# Patient Record
Sex: FEMALE | Race: WHITE | NOT HISPANIC OR LATINO | Employment: OTHER | URBAN - METROPOLITAN AREA
[De-identification: names, ages, dates, MRNs, and addresses within clinical notes are randomized per-mention and may not be internally consistent; named-entity substitution may affect disease eponyms.]

---

## 2017-01-12 ENCOUNTER — OFFICE VISIT (OUTPATIENT)
Dept: FAMILY MEDICINE CLINIC | Facility: CLINIC | Age: 72
End: 2017-01-12

## 2017-01-12 VITALS
HEIGHT: 62 IN | DIASTOLIC BLOOD PRESSURE: 58 MMHG | HEART RATE: 95 BPM | OXYGEN SATURATION: 93 % | BODY MASS INDEX: 20.68 KG/M2 | TEMPERATURE: 98 F | WEIGHT: 112.4 LBS | SYSTOLIC BLOOD PRESSURE: 100 MMHG

## 2017-01-12 DIAGNOSIS — Z09 ENCOUNTER FOR FOLLOW-UP EXAMINATION AFTER COMPLETED TREATMENT FOR CONDITIONS OTHER THAN MALIGNANT NEOPLASM: ICD-10-CM

## 2017-01-12 DIAGNOSIS — G47.00 INSOMNIA, UNSPECIFIED TYPE: Primary | ICD-10-CM

## 2017-01-12 DIAGNOSIS — M79.7 FIBROMYALGIA: ICD-10-CM

## 2017-01-12 DIAGNOSIS — E78.5 HYPERLIPIDEMIA, UNSPECIFIED HYPERLIPIDEMIA TYPE: ICD-10-CM

## 2017-01-12 DIAGNOSIS — Z12.39 BREAST CANCER SCREENING: ICD-10-CM

## 2017-01-12 DIAGNOSIS — D50.8 OTHER IRON DEFICIENCY ANEMIA: ICD-10-CM

## 2017-01-12 PROCEDURE — 99214 OFFICE O/P EST MOD 30 MIN: CPT | Performed by: NURSE PRACTITIONER

## 2017-01-12 RX ORDER — TIOTROPIUM BROMIDE INHALATION SPRAY 3.12 UG/1
SPRAY, METERED RESPIRATORY (INHALATION)
COMMUNITY
Start: 2016-12-15 | End: 2018-12-13

## 2017-01-12 NOTE — PROGRESS NOTES
"Subjective   Елена Beach is a 71 y.o. female.     History of Present Illness   Елена Beach 71 y.o. female who presents today for a new patient appointment.    she has a history of   Patient Active Problem List   Diagnosis   • Fibromyalgia   • Arthritis   • Hiatal hernia   • Anxiety and depression   • Asthma   • Hyperlipidemia   • Iron deficiency anemia   • Chronic obstructive pulmonary disease   • Pulmonary emphysema   • Chronic fatigue   • Snoring   • Dependence on nicotine from cigarettes   • Insomnia   • Breast cancer screening   .  I reviewed the PFSH recorded today by my MA/LPN staff.   she is here to establish care.  She has been feeling well.    Елена Beach 71 y.o. female presents for new evaluation and treatment of insomnia with onset of symptoms years ago. Patient describes symptoms as frequent night time awakening and difficulty falling asleep. Patient has found no relief with over the counter diphenhydramine.         The following portions of the patient's history were reviewed and updated as appropriate: allergies, current medications, past social history and problem list.    Review of Systems   All other systems reviewed and are negative.      Objective   Visit Vitals   • /58 (BP Location: Left arm, Patient Position: Sitting)   • Pulse 95   • Temp 98 °F (36.7 °C)   • Ht 62\" (157.5 cm)   • Wt 112 lb 6.4 oz (51 kg)   • SpO2 93%   • BMI 20.56 kg/m2     Physical Exam   Constitutional: She is oriented to person, place, and time. Vital signs are normal. She appears well-developed and well-nourished. No distress.   HENT:   Head: Normocephalic.   Cardiovascular: Normal rate, regular rhythm and normal heart sounds.    Pulmonary/Chest: Effort normal and breath sounds normal.   Neurological: She is alert and oriented to person, place, and time. Gait normal.   Psychiatric: She has a normal mood and affect. Her behavior is normal. Judgment and thought content normal.   Vitals reviewed.    The " patient has read and signed the [facility] Controlled Substance Contract.  I will continue to see patient for regular follow up appointments.  They are well controlled on their medication.  GEORGETTE has been reviewed by me and is updated every 3 months. The patient is aware of the potential for addiction and dependence.    Assessment/Plan   Problem List Items Addressed This Visit        Musculoskeletal and Integument    Fibromyalgia       Hematopoietic and Hemostatic    Iron deficiency anemia    Relevant Medications    IRON PO    Other Relevant Orders    CBC & Differential    Iron Profile       Other    Hyperlipidemia    Insomnia - Primary    Relevant Medications    Suvorexant (BELSOMRA) 20 MG tablet    Breast cancer screening    Relevant Orders    Mammo Diagnostic Bilateral With CAD      Other Visit Diagnoses     Encounter for follow-up examination after completed treatment for conditions other than malignant neoplasm         Relevant Orders    Mammo Diagnostic Bilateral With CAD           rto in 3 mons

## 2017-01-12 NOTE — MR AVS SNAPSHOT
Елена JOSHUA Cruzerigarrett   1/12/2017 1:00 PM   Office Visit    Dept Phone:  [phone]   Encounter #:  [id number]    Provider:  BEATRIS Collins   Department:  [facility]                Your Full Care Plan              Today's Medication Changes          These changes are accurate as of: 1/12/17  1:27 PM.  If you have any questions, ask your nurse or doctor.               New Medication(s)Ordered:     Suvorexant 20 MG tablet   Commonly known as:  BELSOMRA   Take 20 mg by mouth Every Night.   Started by:  BEATRIS Collins            Where to Get Your Medications      You can get these medications from any pharmacy     Bring a paper prescription for each of these medications     Suvorexant 20 MG tablet                  Your Updated Medication List          This list is accurate as of: 1/12/17  1:27 PM.  Always use your most recent med list.                albuterol 108 (90 BASE) MCG/ACT inhaler   Commonly known as:  PROVENTIL HFA;VENTOLIN HFA       atorvastatin 10 MG tablet   Commonly known as:  LIPITOR   Take 1 tablet by mouth Daily.       budesonide-formoterol 160-4.5 MCG/ACT inhaler   Commonly known as:  SYMBICORT   Inhale 1 puff 2 (two) times a day.       DULoxetine 60 MG capsule   Commonly known as:  CYMBALTA   Take 1 capsule by mouth Daily.       IRON PO       omeprazole 20 MG capsule   Commonly known as:  priLOSEC   TAKE TWO CAPSULES BY MOUTH ONCE DAILY       SPIRIVA RESPIMAT 2.5 MCG/ACT aerosol solution   Generic drug:  Tiotropium Bromide Monohydrate       Suvorexant 20 MG tablet   Commonly known as:  BELSOMRA   Take 20 mg by mouth Every Night.               We Performed the Following     CBC & Differential     Iron Profile       You Were Diagnosed With        Codes Comments    Insomnia, unspecified type    -  Primary ICD-10-CM: G47.00  ICD-9-CM: 780.52     Other iron deficiency anemia     ICD-10-CM: D50.8     Breast cancer screening     ICD-10-CM:  Z12.39  ICD-9-CM: V76.10     Encounter for follow-up examination after completed treatment for conditions other than malignant neoplasm     ICD-10-CM: Z09  ICD-9-CM: V67.9     Hyperlipidemia, unspecified hyperlipidemia type     ICD-10-CM: E78.5  ICD-9-CM: 272.4     Fibromyalgia     ICD-10-CM: M79.7  ICD-9-CM: 729.1       Instructions     None    Patient Instructions History      Upcoming Appointments     Visit Type Date Time Department    OFFICE VISIT 2017  1:00 PM [facility]    OFFICE VISIT 2017 10:00 AM [email or web address]      [facility] Signup     [facility] [employer] allows you to send messages to your doctor, view your test results, renew your prescriptions, schedule appointments, and more. To sign up, go to [employer] and click on the Sign Up Now link in the New User? box. Enter your [employer] Activation Code exactly as it appears below along with the last four digits of your Social Security Number and your Date of Birth () to complete the sign-up process. If you do not sign up before the expiration date, you must request a new code.    [employer] Activation Code: [email or web address]  Expires: 2017  1:27 PM    If you have questions, you can email [email or web address] or call [phone] to talk to our [employer] staff. Remember, [employer] is NOT to be used for urgent needs. For medical emergencies, dial 911.               Other Info from Your Visit           Your Appointments     2017 10:00 AM EST   Office Visit with BEATRIS Collins   [facility] (--)    [address] [id number] [phone]           Arrive 15 minutes prior to appointment.              Allergies     Codeine      Keflex [Cephalexin]      Pseudoephedrine        Reason for Visit     Establish Care Patient is wanting to establish primary care. Needing a referral for a mammogram and needing medications refilled      Immunizations  "Patient has not had a hep c screening       Vital Signs     Blood Pressure Pulse Temperature Height Weight Oxygen Saturation    100/58 (BP Location: Left arm, Patient Position: Sitting) 95 98 °F (36.7 °C) 62\" (157.5 cm) 112 lb 6.4 oz (51 kg) 93%    Body Mass Index Smoking Status                20.56 kg/m2 Former Smoker          Problems and Diagnoses Noted     Breast cancer screening    Fibromyalgia    High cholesterol or triglycerides    Difficulty falling or staying asleep    Iron deficiency anemia    Encounter for follow-up examination after completed treatment for conditions other than malignant neoplasm          No Longer an Issue     CAP (community acquired pneumonia)    Pneumonia    Pneumonia of left lung due to infectious organism    Disease due to rhinovirus    URI, acute        "

## 2017-01-13 LAB
BASOPHILS # BLD AUTO: 0.1 X10E3/UL (ref 0–0.2)
BASOPHILS NFR BLD AUTO: 1 %
EOSINOPHIL # BLD AUTO: 0.9 X10E3/UL (ref 0–0.4)
EOSINOPHIL NFR BLD AUTO: 15 %
ERYTHROCYTE [DISTWIDTH] IN BLOOD BY AUTOMATED COUNT: 12.8 % (ref 12.3–15.4)
FERRITIN SERPL-MCNC: 28 NG/ML (ref 15–150)
HCT VFR BLD AUTO: 39 % (ref 34–46.6)
HGB BLD-MCNC: 12.9 G/DL (ref 11.1–15.9)
IMM GRANULOCYTES # BLD: 0 X10E3/UL (ref 0–0.1)
IMM GRANULOCYTES NFR BLD: 0 %
IRON SATN MFR SERPL: 41 % (ref 15–55)
IRON SERPL-MCNC: 128 UG/DL (ref 27–139)
LYMPHOCYTES # BLD AUTO: 1.6 X10E3/UL (ref 0.7–3.1)
LYMPHOCYTES NFR BLD AUTO: 28 %
MCH RBC QN AUTO: 30.4 PG (ref 26.6–33)
MCHC RBC AUTO-ENTMCNC: 33.1 G/DL (ref 31.5–35.7)
MCV RBC AUTO: 92 FL (ref 79–97)
MONOCYTES # BLD AUTO: 0.6 X10E3/UL (ref 0.1–0.9)
MONOCYTES NFR BLD AUTO: 11 %
NEUTROPHILS # BLD AUTO: 2.5 X10E3/UL (ref 1.4–7)
NEUTROPHILS NFR BLD AUTO: 45 %
PLATELET # BLD AUTO: 413 X10E3/UL (ref 150–379)
RBC # BLD AUTO: 4.25 X10E6/UL (ref 3.77–5.28)
TIBC SERPL-MCNC: 314 UG/DL (ref 250–450)
UIBC SERPL-MCNC: 186 UG/DL (ref 118–369)
WBC # BLD AUTO: 5.5 X10E3/UL (ref 3.4–10.8)

## 2017-01-16 RX ORDER — OMEPRAZOLE 20 MG/1
CAPSULE, DELAYED RELEASE ORAL
Qty: 60 CAPSULE | Refills: 1 | Status: SHIPPED | OUTPATIENT
Start: 2017-01-16 | End: 2017-03-17 | Stop reason: SDUPTHER

## 2017-01-24 ENCOUNTER — HOSPITAL ENCOUNTER (OUTPATIENT)
Dept: MAMMOGRAPHY | Facility: HOSPITAL | Age: 72
Discharge: HOME | End: 2017-01-24
Admitting: NURSE PRACTITIONER

## 2017-01-24 DIAGNOSIS — Z12.39 BREAST CANCER SCREENING: ICD-10-CM

## 2017-01-24 DIAGNOSIS — Z09 ENCOUNTER FOR FOLLOW-UP EXAMINATION AFTER COMPLETED TREATMENT FOR CONDITIONS OTHER THAN MALIGNANT NEOPLASM: ICD-10-CM

## 2017-01-24 PROCEDURE — G0202 SCR MAMMO BI INCL CAD: HCPCS

## 2017-02-21 ENCOUNTER — OFFICE VISIT (OUTPATIENT)
Dept: FAMILY MEDICINE CLINIC | Facility: CLINIC | Age: 72
End: 2017-02-21

## 2017-02-21 VITALS
SYSTOLIC BLOOD PRESSURE: 102 MMHG | DIASTOLIC BLOOD PRESSURE: 66 MMHG | HEIGHT: 62 IN | WEIGHT: 112.5 LBS | BODY MASS INDEX: 20.7 KG/M2 | OXYGEN SATURATION: 94 % | TEMPERATURE: 97.6 F | HEART RATE: 77 BPM

## 2017-02-21 DIAGNOSIS — Z11.59 NEED FOR HEPATITIS C SCREENING TEST: ICD-10-CM

## 2017-02-21 DIAGNOSIS — F32.A ANXIETY AND DEPRESSION: Primary | ICD-10-CM

## 2017-02-21 DIAGNOSIS — M85.80 OSTEOPENIA: ICD-10-CM

## 2017-02-21 DIAGNOSIS — E78.49 OTHER HYPERLIPIDEMIA: ICD-10-CM

## 2017-02-21 DIAGNOSIS — F41.9 ANXIETY AND DEPRESSION: Primary | ICD-10-CM

## 2017-02-21 DIAGNOSIS — M89.9 DISORDER OF BONE: ICD-10-CM

## 2017-02-21 PROCEDURE — G0438 PPPS, INITIAL VISIT: HCPCS | Performed by: NURSE PRACTITIONER

## 2017-02-21 RX ORDER — ATORVASTATIN CALCIUM 10 MG/1
10 TABLET, FILM COATED ORAL DAILY
Qty: 90 TABLET | Refills: 3 | Status: SHIPPED | OUTPATIENT
Start: 2017-02-21 | End: 2018-02-18 | Stop reason: SDUPTHER

## 2017-02-21 RX ORDER — DULOXETIN HYDROCHLORIDE 60 MG/1
60 CAPSULE, DELAYED RELEASE ORAL DAILY
Qty: 90 CAPSULE | Refills: 3 | Status: SHIPPED | OUTPATIENT
Start: 2017-02-21 | End: 2018-02-18 | Stop reason: SDUPTHER

## 2017-02-21 NOTE — PROGRESS NOTES
QUICK REFERENCE INFORMATION:  The ABCs of the Annual Wellness Visit    Initial Medicare Wellness Visit    HEALTH RISK ASSESSMENT    Recent Hospitalizations:  No recent hospitalization(s)..        Current Medical Providers:  Patient Care Team:  BEATRIS Collins as PCP - General (Family Medicine)        Smoking Status:  History   Smoking Status   • Former Smoker   Smokeless Tobacco   • Former User       Alcohol Consumption:  History   Alcohol Use No       Depression Screen:   No flowsheet data found.    Health Habits and Functional and Cognitive Screening:  No flowsheet data found.               Does the patient have evidence of cognitive impairment? No    Asprin use counseling:yes    Finger Rub Hearing Test (right ear):passed  Finger Rub Hearing Test (left ear):passed    Recent Lab Results:    Visual Acuity:  No exam data present    Age-appropriate Screening Schedule:  Refer to the list below for future screening recommendations based on patient's age, sex and/or medical conditions. Orders for these recommended tests are listed in the plan section. The patient has been provided with a written plan.    Health Maintenance   Topic Date Due   • ZOSTER VACCINE  05/26/2016   • DXA SCAN  11/15/2016   • LIPID PANEL  04/01/2017   • PNEUMOCOCCAL VACCINES (65+ LOW/MEDIUM RISK) (2 of 2 - PPSV23) 10/03/2017   • MAMMOGRAM  01/24/2019   • COLONOSCOPY  02/01/2026   • TDAP/TD VACCINES (2 - Td) 10/03/2026   • INFLUENZA VACCINE  Completed        Subjective   History of Present Illness    Елена Beach is a 71 y.o. female who presents for an Annual Wellness Visit. In addition, we addressed the following health issues:    The following portions of the patient's history were reviewed and updated as appropriate: allergies, current medications, past family history, past medical history, past social history, past surgical history and problem list.    Outpatient Medications Prior to Visit   Medication Sig Dispense Refill   •  budesonide-formoterol (SYMBICORT) 160-4.5 MCG/ACT inhaler Inhale 1 puff 2 (two) times a day. 1 inhaler 11   • IRON PO Take  by mouth.     • omeprazole (priLOSEC) 20 MG capsule TAKE TWO CAPSULES BY MOUTH ONCE DAILY 60 capsule 1   • SPIRIVA RESPIMAT 2.5 MCG/ACT aerosol solution      • atorvastatin (LIPITOR) 10 MG tablet Take 1 tablet by mouth Daily. 30 tablet 1   • DULoxetine (CYMBALTA) 60 MG capsule Take 1 capsule by mouth Daily. 30 capsule 1   • albuterol (PROVENTIL HFA;VENTOLIN HFA) 108 (90 BASE) MCG/ACT inhaler daily. ProAir  (90 Base) MCG/ACT Inhalation Aerosol Solution; Patient Sig: ProAir  (90 Base) MCG/ACT Inhalation Aerosol Solution INHALE 2 PUFFS EVERY 4-6 HOURS AS NEEDED.; 1; 5; 20-Jan-2014; Active     • Suvorexant (BELSOMRA) 20 MG tablet Take 20 mg by mouth Every Night. 10 tablet 0     No facility-administered medications prior to visit.        Patient Active Problem List   Diagnosis   • Fibromyalgia   • Arthritis   • Hiatal hernia   • Anxiety and depression   • Asthma   • Hyperlipidemia   • Iron deficiency anemia   • Chronic obstructive pulmonary disease   • Pulmonary emphysema   • Chronic fatigue   • Snoring   • Dependence on nicotine from cigarettes   • Insomnia   • Breast cancer screening   • Osteopenia   • Need for hepatitis C screening test   • Disorder of bone        Advanced Care Planning:  has an advanced directive - a copy HAS NOT been provided    Identification of Risk Factors:  Risk factors include: none.    Review of Systems   All other systems reviewed and are negative.      Compared to one year ago, the patient feels her physical health is better.  Compared to one year ago, the patient feels her mental health is better.    Objective     Physical Exam   Constitutional: She is oriented to person, place, and time. Vital signs are normal. She appears well-developed and well-nourished. No distress.   HENT:   Head: Normocephalic.   Cardiovascular: Normal rate, regular rhythm and  "normal heart sounds.    Pulmonary/Chest: Effort normal and breath sounds normal.   Neurological: She is alert and oriented to person, place, and time. Gait normal.   Psychiatric: She has a normal mood and affect. Her behavior is normal. Judgment and thought content normal.   Vitals reviewed.      Vitals:    02/21/17 1103   BP: 102/66   BP Location: Right arm   Patient Position: Sitting   Pulse: 77   Temp: 97.6 °F (36.4 °C)   SpO2: 94%   Weight: 112 lb 8 oz (51 kg)   Height: 62\" (157.5 cm)       Body mass index is 20.58 kg/(m^2).  Discussed the patient's BMI with her. The BMI is in the acceptable range.    Assessment/Plan   Patient Self-Management and Personalized Health Advice  The patient has been provided with information about: concerns with lack of communication with son and that makes her sad and preventive services including:   · Bone densitometry screening.    Visit Diagnoses:    ICD-10-CM ICD-9-CM   1. Anxiety and depression F41.9 300.00    F32.9 311   2. Other hyperlipidemia E78.4 272.4   3. Osteopenia M85.80 733.90   4. Disorder of bone  M89.9 733.90   5. Need for hepatitis C screening test Z11.59 V73.89       Orders Placed This Encounter   Procedures   • DEXA Bone Density Axial     Standing Status:   Future     Standing Expiration Date:   2/21/2018     Order Specific Question:   Reason for Exam:     Answer:   screening   • Hepatitis C Antibody       Outpatient Encounter Prescriptions as of 2/21/2017   Medication Sig Dispense Refill   • atorvastatin (LIPITOR) 10 MG tablet Take 1 tablet by mouth Daily. 90 tablet 3   • budesonide-formoterol (SYMBICORT) 160-4.5 MCG/ACT inhaler Inhale 1 puff 2 (two) times a day. 1 inhaler 11   • DULoxetine (CYMBALTA) 60 MG capsule Take 1 capsule by mouth Daily. 90 capsule 3   • IRON PO Take  by mouth.     • omeprazole (priLOSEC) 20 MG capsule TAKE TWO CAPSULES BY MOUTH ONCE DAILY 60 capsule 1   • SPIRIVA RESPIMAT 2.5 MCG/ACT aerosol solution      • [DISCONTINUED] atorvastatin " (LIPITOR) 10 MG tablet Take 1 tablet by mouth Daily. 30 tablet 1   • [DISCONTINUED] DULoxetine (CYMBALTA) 60 MG capsule Take 1 capsule by mouth Daily. 30 capsule 1   • [DISCONTINUED] albuterol (PROVENTIL HFA;VENTOLIN HFA) 108 (90 BASE) MCG/ACT inhaler daily. ProAir  (90 Base) MCG/ACT Inhalation Aerosol Solution; Patient Sig: ProAir  (90 Base) MCG/ACT Inhalation Aerosol Solution INHALE 2 PUFFS EVERY 4-6 HOURS AS NEEDED.; 1; 5; 20-Jan-2014; Active     • [DISCONTINUED] Suvorexant (BELSOMRA) 20 MG tablet Take 20 mg by mouth Every Night. 10 tablet 0     No facility-administered encounter medications on file as of 2/21/2017.        Reviewed use of high risk medication in the elderly: yes  Reviewed for potential of harmful drug interactions in the elderly: yes    Follow Up:  No Follow-up on file.     An After Visit Summary and PPPS with all of these plans were given to the patient.

## 2017-02-22 ENCOUNTER — OFFICE VISIT (OUTPATIENT)
Dept: FAMILY MEDICINE CLINIC | Facility: CLINIC | Age: 72
End: 2017-02-22

## 2017-02-22 VITALS
BODY MASS INDEX: 20.61 KG/M2 | HEART RATE: 95 BPM | DIASTOLIC BLOOD PRESSURE: 62 MMHG | SYSTOLIC BLOOD PRESSURE: 108 MMHG | OXYGEN SATURATION: 94 % | TEMPERATURE: 98.3 F | HEIGHT: 62 IN | WEIGHT: 112 LBS

## 2017-02-22 DIAGNOSIS — Z01.419 PAP TEST, AS PART OF ROUTINE GYNECOLOGICAL EXAMINATION: Primary | ICD-10-CM

## 2017-02-22 LAB — HCV AB S/CO SERPL IA: <0.1 S/CO RATIO (ref 0–0.9)

## 2017-02-22 PROCEDURE — G0101 CA SCREEN;PELVIC/BREAST EXAM: HCPCS | Performed by: NURSE PRACTITIONER

## 2017-02-22 PROCEDURE — 99212 OFFICE O/P EST SF 10 MIN: CPT | Performed by: NURSE PRACTITIONER

## 2017-02-23 LAB
CYTOLOGIST CVX/VAG CYTO: NORMAL
CYTOLOGY CVX/VAG DOC THIN PREP: NORMAL
DX ICD CODE: NORMAL
HIV 1 & 2 AB SER-IMP: NORMAL
OTHER STN SPEC: NORMAL
PATH REPORT.FINAL DX SPEC: NORMAL
STAT OF ADQ CVX/VAG CYTO-IMP: NORMAL

## 2017-03-02 ENCOUNTER — HOSPITAL ENCOUNTER (OUTPATIENT)
Dept: BONE DENSITY | Facility: HOSPITAL | Age: 72
Discharge: HOME | End: 2017-03-02
Admitting: NURSE PRACTITIONER

## 2017-03-02 DIAGNOSIS — M85.80 OSTEOPENIA: ICD-10-CM

## 2017-03-02 DIAGNOSIS — M89.9 DISORDER OF BONE: ICD-10-CM

## 2017-03-02 PROCEDURE — 77080 DXA BONE DENSITY AXIAL: CPT

## 2017-03-16 RX ORDER — OMEPRAZOLE 20 MG/1
CAPSULE, DELAYED RELEASE ORAL
Qty: 60 CAPSULE | Refills: 0 | OUTPATIENT
Start: 2017-03-16

## 2017-03-17 ENCOUNTER — TELEPHONE (OUTPATIENT)
Dept: GASTROENTEROLOGY | Facility: CLINIC | Age: 72
End: 2017-03-17

## 2017-03-17 RX ORDER — OMEPRAZOLE 20 MG/1
CAPSULE, DELAYED RELEASE ORAL
Qty: 60 CAPSULE | Refills: 0 | Status: SHIPPED | OUTPATIENT
Start: 2017-03-17 | End: 2017-03-28 | Stop reason: SDUPTHER

## 2017-03-17 NOTE — TELEPHONE ENCOUNTER
Called pt and advised that we have escribed a 30 day supply of her omeprazole to get her by till her appt.  Pt verb understanding.

## 2017-03-17 NOTE — TELEPHONE ENCOUNTER
----- Message from Ange Fu sent at 3/17/2017 10:29 AM EDT -----  Regarding: PT CALLED - PENDING REFILL   Contact: [phone]  [facility] O/V ON 03/28 WITH DR OTTONIEL LAUGHLIN OUT OF MEDS. NEED REFILL

## 2017-03-28 ENCOUNTER — OFFICE VISIT (OUTPATIENT)
Dept: GASTROENTEROLOGY | Facility: CLINIC | Age: 72
End: 2017-03-28

## 2017-03-28 VITALS
DIASTOLIC BLOOD PRESSURE: 70 MMHG | SYSTOLIC BLOOD PRESSURE: 110 MMHG | BODY MASS INDEX: 19.28 KG/M2 | WEIGHT: 108.8 LBS | HEIGHT: 63 IN

## 2017-03-28 DIAGNOSIS — K21.9 GASTROESOPHAGEAL REFLUX DISEASE, ESOPHAGITIS PRESENCE NOT SPECIFIED: Primary | ICD-10-CM

## 2017-03-28 DIAGNOSIS — D50.9 IRON DEFICIENCY ANEMIA, UNSPECIFIED IRON DEFICIENCY ANEMIA TYPE: ICD-10-CM

## 2017-03-28 DIAGNOSIS — K90.41 GLUTEN-SENSITIVE ENTEROPATHY: ICD-10-CM

## 2017-03-28 PROCEDURE — 99214 OFFICE O/P EST MOD 30 MIN: CPT | Performed by: INTERNAL MEDICINE

## 2017-03-28 RX ORDER — OMEPRAZOLE 40 MG/1
CAPSULE, DELAYED RELEASE ORAL
Qty: 90 CAPSULE | Refills: 3 | Status: SHIPPED | OUTPATIENT
Start: 2017-03-28 | End: 2018-04-18 | Stop reason: SDUPTHER

## 2017-03-28 NOTE — PROGRESS NOTES
Chief Complaint   Patient presents with   • Follow-up     yearly        Елена Beach is a  72 y.o. female here for a follow up visit for GERD, iron deficiency anemia, gluten enteropathy    HPI this 72-year-old white female patient of Caleb SPEAR returns in follow-up since her last office visit of January 2016.  At that time she had been scheduled for upper and lower endoscopic evaluation secondary to her iron deficiency state.  Upper endoscopy revealed some focal villous blunting consistent with her history of celiac disease.  He also had mild gastritis and mild esophagitis.  Colonoscopy was essentially negative.  She reports lab work obtained in January of this year showed stable hemoglobin and hematocrit as well as normal iron studies.  She continues to use omeprazole 40 mg daily to manage reflux.  She has had 2 episodes of crampy abdominal pain and one was followed by a bout of diarrhea after she had undergone dental extraction and bone grafting.  She was recently placed on amoxicillin after her stitches were removed from this extraction advised her to initiate a probiotic at the same time to minimize antibiotic-induced colitis.  She is going to observe her condition and will call with any further GI related problems.  Otherwise plan on follow-up in 1 year and will prescribe 40 mg omeprazole daily with a 3 month supply and 3 refills.    Past Medical History:   Diagnosis Date   • Arthritis    • Asthma    • Fibromyalgia    • Hiatal hernia    • Osteoporosis        Current Outpatient Prescriptions   Medication Sig Dispense Refill   • atorvastatin (LIPITOR) 10 MG tablet Take 1 tablet by mouth Daily. 90 tablet 3   • budesonide-formoterol (SYMBICORT) 160-4.5 MCG/ACT inhaler Inhale 1 puff 2 (two) times a day. 1 inhaler 11   • DULoxetine (CYMBALTA) 60 MG capsule Take 1 capsule by mouth Daily. 90 capsule 3   • omeprazole (priLOSEC) 20 MG capsule Take two capsules by mouth once daily 60 capsule 0   • SPIRIVA  RESPIMAT 2.5 MCG/ACT aerosol solution      • IRON PO Take  by mouth.       No current facility-administered medications for this visit.        PRN Meds:.    Allergies   Allergen Reactions   • Codeine    • Ibuprofen    • Keflex [Cephalexin]    • Pseudoephedrine        Social History     Social History   • Marital status: Single     Spouse name: N/A   • Number of children: N/A   • Years of education: N/A     Occupational History   • Not on file.     Social History Main Topics   • Smoking status: Former Smoker     Quit date: 11/1/2016   • Smokeless tobacco: Former User   • Alcohol use Yes      Comment: social   • Drug use: No   • Sexual activity: Defer     Other Topics Concern   • Not on file     Social History Narrative       History reviewed. No pertinent family history.    Review of Systems   Constitutional: Positive for fatigue. Negative for activity change, appetite change and unexpected weight change.        Insomnia   HENT: Negative for congestion, facial swelling, sore throat, trouble swallowing and voice change.    Eyes: Negative for photophobia and visual disturbance.   Respiratory: Negative for cough and choking.    Cardiovascular: Negative for chest pain.   Gastrointestinal: Negative for abdominal distention, abdominal pain, anal bleeding, blood in stool, constipation, diarrhea, nausea, rectal pain and vomiting.        GERD   Endocrine: Negative for polyphagia.   Musculoskeletal: Negative for arthralgias, gait problem and joint swelling.        Fibromyalgia   Skin: Negative for color change, pallor and rash.   Allergic/Immunologic: Negative for food allergies.   Neurological: Negative for speech difficulty and headaches.   Hematological: Does not bruise/bleed easily.   Psychiatric/Behavioral: Negative for agitation, confusion and sleep disturbance.       Vitals:    03/28/17 1112   BP: 110/70       Physical Exam   Constitutional: She is oriented to person, place, and time. She appears well-developed and  well-nourished.   HENT:   Head: Normocephalic.   Mouth/Throat: Oropharynx is clear and moist.   Eyes: Conjunctivae and EOM are normal.   Neck: Normal range of motion.   Cardiovascular: Normal rate and regular rhythm.    Pulmonary/Chest: Breath sounds normal.   Abdominal: Soft. Bowel sounds are normal.   Musculoskeletal: Normal range of motion.   Neurological: She is alert and oriented to person, place, and time.   Skin: Skin is warm and dry.   Psychiatric: She has a normal mood and affect. Her behavior is normal.       ASSESSMENT   #1 GERD: Stable on PPI  #2 iron deficiency anemia: Stable per recent labs  #3 gluten enteropathy      PLAN  Continue omeprazole 40 mg daily  Office follow-up in 1 year  Patient to call sooner as needed for any GI related complaints      ICD-10-CM ICD-9-CM   1. Gastroesophageal reflux disease, esophagitis presence not specified K21.9 530.81   2. Iron deficiency anemia, unspecified iron deficiency anemia type D50.9 280.9   3. Gluten-sensitive enteropathy K90.0 579.0

## 2017-04-06 DIAGNOSIS — J18.9 CAP (COMMUNITY ACQUIRED PNEUMONIA): ICD-10-CM

## 2017-04-06 RX ORDER — BUDESONIDE AND FORMOTEROL FUMARATE DIHYDRATE 160; 4.5 UG/1; UG/1
AEROSOL RESPIRATORY (INHALATION)
Refills: 0 | Status: CANCELLED | OUTPATIENT
Start: 2017-04-06

## 2017-04-10 DIAGNOSIS — J18.9 CAP (COMMUNITY ACQUIRED PNEUMONIA): ICD-10-CM

## 2017-04-12 ENCOUNTER — TELEPHONE (OUTPATIENT)
Dept: FAMILY MEDICINE CLINIC | Facility: CLINIC | Age: 72
End: 2017-04-12

## 2017-04-12 DIAGNOSIS — J18.9 CAP (COMMUNITY ACQUIRED PNEUMONIA): ICD-10-CM

## 2017-04-12 RX ORDER — BUDESONIDE AND FORMOTEROL FUMARATE DIHYDRATE 160; 4.5 UG/1; UG/1
1 AEROSOL RESPIRATORY (INHALATION)
Qty: 1 INHALER | Refills: 11 | Status: SHIPPED | OUTPATIENT
Start: 2017-04-12 | End: 2018-02-20

## 2017-04-12 NOTE — TELEPHONE ENCOUNTER
Patient requesting Symbicort refill for [facility] [address]. Pharmacy is telling patient they have tried to contact us several times since Friday. Patient notified no contact made to us for this medication.    Please call patient after speaking with pharmacy.

## 2017-04-14 DIAGNOSIS — E78.5 HYPERLIPIDEMIA, UNSPECIFIED HYPERLIPIDEMIA TYPE: Primary | ICD-10-CM

## 2017-04-14 DIAGNOSIS — D50.8 OTHER IRON DEFICIENCY ANEMIA: ICD-10-CM

## 2017-04-15 LAB
ALBUMIN SERPL-MCNC: 4 G/DL (ref 3.5–4.8)
ALBUMIN/GLOB SERPL: 1.5 {RATIO} (ref 1.2–2.2)
ALP SERPL-CCNC: 132 IU/L (ref 39–117)
ALT SERPL-CCNC: 16 IU/L (ref 0–32)
AST SERPL-CCNC: 24 IU/L (ref 0–40)
BASOPHILS # BLD AUTO: 0 X10E3/UL (ref 0–0.2)
BASOPHILS NFR BLD AUTO: 0 %
BILIRUB SERPL-MCNC: 0.2 MG/DL (ref 0–1.2)
BUN SERPL-MCNC: 9 MG/DL (ref 8–27)
BUN/CREAT SERPL: 15 (ref 12–28)
CALCIUM SERPL-MCNC: 9.4 MG/DL (ref 8.7–10.3)
CHLORIDE SERPL-SCNC: 99 MMOL/L (ref 96–106)
CHOLEST SERPL-MCNC: 151 MG/DL (ref 100–199)
CO2 SERPL-SCNC: 26 MMOL/L (ref 18–29)
CREAT SERPL-MCNC: 0.6 MG/DL (ref 0.57–1)
EOSINOPHIL # BLD AUTO: 1.1 X10E3/UL (ref 0–0.4)
EOSINOPHIL NFR BLD AUTO: 16 %
ERYTHROCYTE [DISTWIDTH] IN BLOOD BY AUTOMATED COUNT: 13.2 % (ref 12.3–15.4)
FERRITIN SERPL-MCNC: 20 NG/ML (ref 15–150)
GLOBULIN SER CALC-MCNC: 2.7 G/DL (ref 1.5–4.5)
GLUCOSE SERPL-MCNC: 100 MG/DL (ref 65–99)
HCT VFR BLD AUTO: 40 % (ref 34–46.6)
HDLC SERPL-MCNC: 70 MG/DL
HGB BLD-MCNC: 13.5 G/DL (ref 11.1–15.9)
IMM GRANULOCYTES # BLD: 0 X10E3/UL (ref 0–0.1)
IMM GRANULOCYTES NFR BLD: 0 %
IRON SATN MFR SERPL: 12 % (ref 15–55)
IRON SERPL-MCNC: 40 UG/DL (ref 27–139)
LDLC SERPL CALC-MCNC: 69 MG/DL (ref 0–99)
LDLC/HDLC SERPL: 1 RATIO UNITS (ref 0–3.2)
LYMPHOCYTES # BLD AUTO: 1.7 X10E3/UL (ref 0.7–3.1)
LYMPHOCYTES NFR BLD AUTO: 24 %
MCH RBC QN AUTO: 30.3 PG (ref 26.6–33)
MCHC RBC AUTO-ENTMCNC: 33.8 G/DL (ref 31.5–35.7)
MCV RBC AUTO: 90 FL (ref 79–97)
MONOCYTES # BLD AUTO: 0.6 X10E3/UL (ref 0.1–0.9)
MONOCYTES NFR BLD AUTO: 9 %
NEUTROPHILS # BLD AUTO: 3.6 X10E3/UL (ref 1.4–7)
NEUTROPHILS NFR BLD AUTO: 51 %
PLATELET # BLD AUTO: 345 X10E3/UL (ref 150–379)
POTASSIUM SERPL-SCNC: 4.3 MMOL/L (ref 3.5–5.2)
PROT SERPL-MCNC: 6.7 G/DL (ref 6–8.5)
RBC # BLD AUTO: 4.45 X10E6/UL (ref 3.77–5.28)
SODIUM SERPL-SCNC: 140 MMOL/L (ref 134–144)
TIBC SERPL-MCNC: 339 UG/DL (ref 250–450)
TRIGL SERPL-MCNC: 61 MG/DL (ref 0–149)
UIBC SERPL-MCNC: 299 UG/DL (ref 118–369)
VLDLC SERPL CALC-MCNC: 12 MG/DL (ref 5–40)
WBC # BLD AUTO: 7.1 X10E3/UL (ref 3.4–10.8)

## 2017-04-18 RX ORDER — OMEPRAZOLE 20 MG/1
CAPSULE, DELAYED RELEASE ORAL
Qty: 60 CAPSULE | Refills: 0 | OUTPATIENT
Start: 2017-04-18

## 2017-06-16 ENCOUNTER — OFFICE VISIT (OUTPATIENT)
Dept: ORTHOPEDIC SURGERY | Facility: CLINIC | Age: 72
End: 2017-06-16

## 2017-06-16 VITALS — HEIGHT: 61 IN | WEIGHT: 106.4 LBS | BODY MASS INDEX: 20.09 KG/M2 | TEMPERATURE: 97.7 F

## 2017-06-16 DIAGNOSIS — G89.29 CHRONIC PAIN OF RIGHT KNEE: Primary | ICD-10-CM

## 2017-06-16 DIAGNOSIS — M25.561 CHRONIC PAIN OF RIGHT KNEE: Primary | ICD-10-CM

## 2017-06-16 PROCEDURE — 20610 DRAIN/INJ JOINT/BURSA W/O US: CPT | Performed by: ORTHOPAEDIC SURGERY

## 2017-06-16 PROCEDURE — 73562 X-RAY EXAM OF KNEE 3: CPT | Performed by: ORTHOPAEDIC SURGERY

## 2017-06-16 PROCEDURE — 99204 OFFICE O/P NEW MOD 45 MIN: CPT | Performed by: ORTHOPAEDIC SURGERY

## 2017-06-16 RX ORDER — MULTIPLE VITAMINS W/ MINERALS TAB 9MG-400MCG
1 TAB ORAL DAILY
COMMUNITY
End: 2018-12-13

## 2017-06-16 RX ADMIN — METHYLPREDNISOLONE ACETATE 160 MG: 80 INJECTION, SUSPENSION INTRA-ARTICULAR; INTRALESIONAL; INTRAMUSCULAR; SOFT TISSUE at 07:42

## 2017-06-16 RX ADMIN — LIDOCAINE HYDROCHLORIDE 2 ML: 10 INJECTION, SOLUTION INFILTRATION; PERINEURAL at 07:42

## 2017-06-16 RX ADMIN — BUPIVACAINE HYDROCHLORIDE 2 ML: 5 INJECTION, SOLUTION PERINEURAL at 07:42

## 2017-06-18 NOTE — PROGRESS NOTES
Patient: Елена Beach    YOB: 1945    Medical Record Number: [id number]    Chief Complaints:  Right knee pain    History of Present Illness:     72 y.o. female patient who presents for evaluation of her right knee.  She reports a several year history of worsening symptoms.  She fell the week before [place] and it seemed to aggravate things.  She describes moderate, constant, aching/burning pain along the medial side of the knee.  The pain is worse with walking and going up and down stairs.  She has noticed some associated swelling.  Rest seems to help.    Allergies:   Allergies   Allergen Reactions   • Codeine    • Ibuprofen    • Keflex [Cephalexin]    • Pseudoephedrine        Medications:   Home Medications    Current Outpatient Prescriptions:   •  atorvastatin (LIPITOR) 10 MG tablet, Take 1 tablet by mouth Daily., Disp: 90 tablet, Rfl: 3  •  budesonide-formoterol (SYMBICORT) 160-4.5 MCG/ACT inhaler, Inhale 1 puff 2 (Two) Times a Day., Disp: 1 inhaler, Rfl: 11  •  calcium citrate-vitamin d (CALCIUM CITRATE + D3) 200-250 MG-UNIT tablet tablet, Take  by mouth Daily., Disp: , Rfl:   •  DULoxetine (CYMBALTA) 60 MG capsule, Take 1 capsule by mouth Daily., Disp: 90 capsule, Rfl: 3  •  IRON PO, Take  by mouth., Disp: , Rfl:   •  IRON, FERROUS GLUCONATE, PO, Take  by mouth., Disp: , Rfl:   •  Multiple Vitamins-Minerals (MULTIVITAMIN WITH MINERALS) tablet tablet, Take 1 tablet by mouth Daily., Disp: , Rfl:   •  omeprazole (priLOSEC) 40 MG capsule, Take 1 capsule by mouth once daily, Disp: 90 capsule, Rfl: 3  •  SPIRIVA RESPIMAT 2.5 MCG/ACT aerosol solution, , Disp: , Rfl:     Past Medical History:   Diagnosis Date   • Anemia    • Arthritis    • Asthma    • Fibromyalgia    • Hiatal hernia    • Osteoporosis        Past Surgical History:   Procedure Laterality Date   • FOOT SURGERY Right 2006   • SHOULDER SURGERY Left    • TOE SURGERY     • TOOTH EXTRACTION     • TUBAL ABDOMINAL LIGATION     • WRIST SURGERY  "        Social History     Occupational History   • Not on file.     Social History Main Topics   • Smoking status: Former Smoker     Quit date: 11/1/2016   • Smokeless tobacco: Former User   • Alcohol use Yes      Comment: social   • Drug use: No   • Sexual activity: Defer      Social History     Social History Narrative       Family History   Problem Relation Age of Onset   • Heart failure Mother    • Hypertension Mother    • Cancer Mother      breast   • Cancer Father      prostate       Review of Systems:      Constitutional: Denies fever, shaking or chills   Eyes: Denies change in visual acuity   HEENT: Denies nasal congestion or sore throat   Respiratory: Denies cough or shortness of breath   Cardiovascular: Denies chest pain or edema  Endocrine: Denies tremors, palpitations, intolerance of heat or cold, polyuria, polydipsia.  GI: Denies abdominal pain, nausea, vomiting, bloody stools or diarrhea  : Denies frequency, urgency, incontinence, retention, or nocturia.  Musculoskeletal: Denies numbness tingling or loss of motor function except as above  Integument: Denies rash, lesion or ulceration   Neurologic: Denies headache or focal weakness, deficits  Heme: Denies epistaxis, spontaneous or excessive bleeding, epistaxis, hematuria, melena, fatigue, enlarged or tender lymph nodes.      All other pertinent positives and negatives as noted above in HPI.    Physical Exam: 72 y.o. female  Vitals:    06/16/17 1359   Temp: 97.7 °F (36.5 °C)   TempSrc: Temporal Artery    Weight: 106 lb 6.4 oz (48.3 kg)   Height: 61\" (154.9 cm)       General:  Patient is awake and alert.  Appears in no acute distress or discomfort.    Psych:  Affect and demeanor are appropriate.    Eyes:  Conjunctiva and sclera appear grossly normal.  Eyes track well and EOM seem to be intact.    Ears:  No gross abnormalities.  Hearing adequate for the exam.    Cardiovascular:  Regular rate and rhythm.    Lungs:  Good chest expansion.  Breathing " unlabored.    Spine:  Back appears grossly normal.  No palpable masses or adenopathy.  Good motion.  Straight leg raise and crossed straight leg raise manuever are both negative for lower leg and/or knee pain.    Extremities:  Skin is benign.  No obvious gross abnormalities about right knee.  No palpable masses or adenopathy.  Moderate tenderness noted over medial joint line.  Motion is near full and symmetric to the contralateral side.  No instability.  Strength is well preserved including hip flexion, knee extension, ankle and toe plantarflexion, ankle inversion and eversion.  Good sensory function throughout the leg and foot.  Palpable pulses.         Radiology:   Bilateral standing AP views, bilateral merchants views and a lateral view of the Right knee are ordered by myself and reviewed to evaluate the patient's complaint.  No comparison films are immediately available.  The x-rays show significant degenerative arthritis including joint space narrowing, osteophyte formation, and subchondral sclerosis.  The majority of the degenerative changes appear to involve the medial compartment.    Assessment/Plan:  Right knee osteoarthritis    We discussed treatment options in detail including the risks, benefits, and alternatives of conservative treatment versus surgical options.  Regarding conservative treatment, we discussed appropriate activity modifications, anti-inflammatories, injections (including both corticosteroids and viscosupplementation), and physical therapy.  We also discussed the option of an arthroplasty and all that would entail.  I have recommended that we start with a conservative approach and the patient agrees.    The patient has acknowledged understanding of the information and elected for an injection.  The risks, benefits, and alternatives to an injection were discussed.  She consented.  Going forward, I will release her to follow-up as needed.  If her symptoms persist or recur, I told her that I  will be happy to see her back.    Large Joint Arthrocentesis  Date/Time: 6/16/2017 7:42 AM  Consent given by: patient  Site marked: site marked  Timeout: Immediately prior to procedure a time out was called to verify the correct patient, procedure, equipment, support staff and site/side marked as required   Supporting Documentation  Indications: pain and joint swelling   Procedure Details  Location: knee - R knee  Preparation: Patient was prepped and draped in the usual sterile fashion  Needle size: 25 G (21 G)  Approach: anterolateral  Medications administered: 2 mL lidocaine 1 %; 160 mg methylPREDNISolone acetate 80 MG/ML; 2 mL bupivacaine  Patient tolerance: patient tolerated the procedure well with no immediate complications        Gerry Estevez MD    06/16/2017    CC to BEATRIS Collins

## 2017-06-19 RX ORDER — BUPIVACAINE HYDROCHLORIDE 5 MG/ML
2 INJECTION, SOLUTION PERINEURAL
Status: COMPLETED | OUTPATIENT
Start: 2017-06-16 | End: 2017-06-16

## 2017-06-19 RX ORDER — LIDOCAINE HYDROCHLORIDE 10 MG/ML
2 INJECTION, SOLUTION INFILTRATION; PERINEURAL
Status: COMPLETED | OUTPATIENT
Start: 2017-06-16 | End: 2017-06-16

## 2017-06-19 RX ORDER — METHYLPREDNISOLONE ACETATE 80 MG/ML
160 INJECTION, SUSPENSION INTRA-ARTICULAR; INTRALESIONAL; INTRAMUSCULAR; SOFT TISSUE
Status: COMPLETED | OUTPATIENT
Start: 2017-06-16 | End: 2017-06-16

## 2017-07-31 ENCOUNTER — RESULTS ENCOUNTER (OUTPATIENT)
Dept: FAMILY MEDICINE CLINIC | Facility: CLINIC | Age: 72
End: 2017-07-31

## 2017-07-31 DIAGNOSIS — E61.1 LOW IRON: ICD-10-CM

## 2017-07-31 DIAGNOSIS — E61.1 LOW IRON: Primary | ICD-10-CM

## 2017-08-02 LAB
FERRITIN SERPL-MCNC: 23 NG/ML (ref 15–150)
IRON SATN MFR SERPL: 20 % (ref 15–55)
IRON SERPL-MCNC: 62 UG/DL (ref 27–139)
TIBC SERPL-MCNC: 314 UG/DL (ref 250–450)
UIBC SERPL-MCNC: 252 UG/DL (ref 118–369)

## 2017-10-09 ENCOUNTER — OFFICE VISIT (OUTPATIENT)
Dept: ORTHOPEDIC SURGERY | Facility: CLINIC | Age: 72
End: 2017-10-09

## 2017-10-09 VITALS — HEIGHT: 63 IN | BODY MASS INDEX: 18.78 KG/M2 | WEIGHT: 106 LBS

## 2017-10-09 DIAGNOSIS — M17.11 PRIMARY OSTEOARTHRITIS OF RIGHT KNEE: Primary | ICD-10-CM

## 2017-10-09 PROCEDURE — 99212 OFFICE O/P EST SF 10 MIN: CPT | Performed by: ORTHOPAEDIC SURGERY

## 2017-10-09 RX ORDER — MELOXICAM 15 MG/1
15 TABLET ORAL DAILY PRN
Qty: 30 TABLET | Refills: 2 | Status: SHIPPED | OUTPATIENT
Start: 2017-10-09 | End: 2018-02-20

## 2017-10-10 NOTE — PROGRESS NOTES
CC:  Right knee pain    Ms. Beach comes in today for follow-up of the right knee.  She got a shot in June.  The shot was quite painful for her and she does not feel that it helped.  Unfortunately, she continues to have moderate constant aching pain along the medial side of the knee which is worse with prolonged standing and walking.    On exam, she has a trace effusion.  Mild tenderness along the medial joint line.  Motion is well preserved.  No instability.  Gait is mildly antalgic.    Her previous x-rays are again reviewed.  The x-rays show what appears to be moderate medial compartment osteoarthritis.    Assessment: Right knee osteoarthritis    Plan: We discussed options.  The last injection did not help and was painful for her.  She is, understandably, reluctant to get that repeated.  We talked about other options.  I recommended a prescription strength anti-inflammatory.  She was given his prescription for meloxicam.  The risks of this medicine were discussed.  I've also recommended physical therapy and she was given a referral for that as well.  Going forward, she will follow up as needed.    Gerry Estevez MD

## 2017-10-13 ENCOUNTER — HOSPITAL ENCOUNTER (OUTPATIENT)
Dept: PHYSICAL THERAPY | Facility: HOSPITAL | Age: 72
Setting detail: THERAPIES SERIES
Discharge: HOME | End: 2017-10-13
Attending: ORTHOPAEDIC SURGERY

## 2017-10-13 DIAGNOSIS — M25.561 CHRONIC PAIN OF RIGHT KNEE: Primary | ICD-10-CM

## 2017-10-13 DIAGNOSIS — G89.29 CHRONIC PAIN OF RIGHT KNEE: Primary | ICD-10-CM

## 2017-10-13 PROCEDURE — 97161 PT EVAL LOW COMPLEX 20 MIN: CPT | Performed by: PHYSICAL THERAPIST

## 2017-10-13 PROCEDURE — G8982 BODY POS GOAL STATUS: HCPCS | Performed by: PHYSICAL THERAPIST

## 2017-10-13 PROCEDURE — 97110 THERAPEUTIC EXERCISES: CPT | Performed by: PHYSICAL THERAPIST

## 2017-10-13 PROCEDURE — G8981 BODY POS CURRENT STATUS: HCPCS | Performed by: PHYSICAL THERAPIST

## 2017-10-13 NOTE — THERAPY EVALUATION
Outpatient Physical Therapy Ortho Initial Evaluation  [facility]     Patient Name: Елена Beach  : 1945  MRN: [id number]  Today's Date: 10/13/2017      Visit Date: 10/13/2017    Patient Active Problem List   Diagnosis   • Fibromyalgia   • Arthritis   • Hiatal hernia   • Anxiety and depression   • Asthma   • Hyperlipidemia   • Iron deficiency anemia   • Chronic obstructive pulmonary disease   • Pulmonary emphysema   • Chronic fatigue   • Snoring   • Dependence on nicotine from cigarettes   • Insomnia   • Breast cancer screening   • Osteopenia   • Need for hepatitis C screening test   • Disorder of bone    • Pap test, as part of routine gynecological examination        Past Medical History:   Diagnosis Date   • Anemia    • Arthritis    • Asthma    • Fibromyalgia    • Hiatal hernia    • Osteoporosis         Past Surgical History:   Procedure Laterality Date   • FOOT SURGERY Right    • SHOULDER SURGERY Left    • TOE SURGERY     • TOOTH EXTRACTION     • TUBAL ABDOMINAL LIGATION     • WRIST SURGERY         Visit Dx:     ICD-10-CM ICD-9-CM   1. Chronic pain of right knee M25.561 719.46    G89.29 338.29             Patient History       10/13/17 1000          History    Chief Complaint Pain  -RA      Type of Pain Knee pain  -RA      Date Current Problem(s) Began --   2+ year hx of knee pain, worse in last few months   -RA      Brief Description of Current Complaint Patient reports having knee pain for 2+ years with stairs but in last few months has been having constant deep aching pain.  Now has pain with WB activity, prolonged positioning/activity, stairs. 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 No  -RA      How has patient tried to help current problem? OTC meds - Tylenol (just got rx for meloxicam)   -RA      What clinical tests have you had for this problem? X-ray  -RA      Results of Clinical Tests degenerative arthritis  -RA      Pain     Pain Location Knee  -RA      Pain at Present 6  -RA      Pain at Best 7  -RA      Pain at Worst 5  -RA      Pain Frequency Constant/continuous  -RA      Pain Description Aching  -RA      What Performance Factors Make the Current Problem(s) WORSE? prolonged positioning/WB activity, stairs  -RA      What Performance Factors Make the Current Problem(s) BETTER? elevating, stretching knee out straight  -RA      Is your sleep disturbed? Yes   sometimes due to knee, not always  -RA      What position do you sleep in? Right sidelying;Left sidelying  -RA      Difficulties with ADL's? able to perform with pain/compensation   -RA      Difficulties with recreational activities? volunteer activities at [employer]   -RA      Fall Risk Assessment    Any falls in the past year: Yes  -RA      Number of falls reported in the last 12 months 1  -RA      Factors that contributed to the fall: Tripped   over something in yard in December  -RA      Daily Activities    Primary Language English  -RA      Are you able to read Yes  -RA      Are you able to write Yes  -RA      How does patient learn best? Reading;Demonstration;Pictures/Video  -RA      Teaching needs identified Home Exercise Program;Management of Condition  -RA      Patient is concerned about/has problems with Standing;Transfers (getting out of a chair, bed);Walking;Climbing Stairs  -RA      Does patient have problems with the following? None  -RA      Barriers to learning None  -RA      Functional Status --   independent with pain and/or modification   -RA      Pt Participated in POC and Goals Yes  -RA      Safety    Are you being hurt, hit, or frightened by  anyone at home or in your life? No  -RA      Are you being neglected by a caregiver No  -RA        User Key  (r) = Recorded By, (t) = Taken By, (c) = Cosigned By    Initials Name Provider Type    RA Marie Bhatt PT Physical Therapist                PT Ortho       10/13/17 1000    Posture/Observations    Posture/Observations Comments FH/rounded shoulder, mild antalgic/compensated gait, difficulty with transitional movements due to knee pain   -RA    Special Tests/Palpation    Special Tests/Palpation Knee  -RA    Knee Palpation    Knee Palpation? Yes   tender medial/lateral joint line, crepitus with ROM  -RA    Patellar Accessory Motions    Patellar Accessory Motions Tested? Yes  -RA    Left Knee    Extension/Flexion ROM Details 0-132 degrees  -RA    Right Knee    Extension/Flexion ROM Details 3-128 degrees  -RA    Left Hip    Hip Flexion Gross Movement (4+/5) good plus  -RA    Hip Extension Gross Movement (4-/5) good minus  -RA    Hip ABduction Gross Movement (4/5) good   hip discomfort/pain  -RA    Hip Ext (Lat) Rotation Gross Movement (4/5) good   hip discomfort/pain  -RA    Right Hip    Hip Flexion Gross Movement (4/5) good  -RA    Hip Extension Gross Movement (4-/5) good minus  -RA    Hip ABduction Gross Movement (4-/5) good minus;(4/5) good   hip discomfort/pain  -RA    Hip Ext (Lat) Rotation Gross Movement (4-/5) good minus   hip discomfort/pain  -RA    Left Knee    Knee Extension Gross Movement (4+/5) good plus  -RA    Knee Flexion Hamstrings (4+/5) good plus  -RA    Right Knee    Knee Extension Gross Movement (4-/5) good minus;(4/5) good   gives 2/2 pain  -RA    Knee Flexion Gross Movement (4/5) good   lateral knee pain  -RA    Left Ankle/Foot    Ankle PF Gross Movement (4-/5) good minus;(4/5) good   WB  -RA    Ankle Dorsiflexion Gross Movement (4/5) good  -RA    Right Ankle/Foot    Ankle PF Gross Movement (4-/5) good minus;(4/5) good   WB  -RA    Ankle Dorsiflexion Gross Movement (4/5) good  -RA    Lower  Extremity Flexibility    LE Flexibility Comments tightness of calf/HS tissues  -RA    Balance Skills Training    SLS impaired B </= 4-6 seconds  -RA    Transfers    Transfers, Sit-Stand [place] conditional independence  -RA    Transfers, Stand-Sit [place] conditional independence  -RA    Transfer, Comment requires B UE push, reduced WB thru R LE   -RA    Gait Assessment/Treatment    Gait, [place] Level independent  -RA    Gait, Gait Deviations antalgic  -RA    Gait, Comment mild lateral trunk sway noted  -RA      User Key  (r) = Recorded By, (t) = Taken By, (c) = Cosigned By    Initials Name Provider Type    TONY Bhatt PT Physical Therapist                            Therapy Education       10/13/17 1147          Therapy Education    Education Details Discussed knee anatomy/mechanics, diagnosis, prognosis, evaluation findings, and POC.    -RA      Given HEP;Symptoms/condition management;Pain management;Mobility training  -RA      Program New  -RA      How Provided Verbal;Demonstration;Written  -RA      Provided to Patient  -RA      Level of Understanding Teach back education performed;Verbalized  -RA        User Key  (r) = Recorded By, (t) = Taken By, (c) = Cosigned By    Initials Name Provider Type    TONY Bhatt PT Physical Therapist                PT OP Goals       10/13/17 1100       PT Short Term Goals    STG 1 Patient will be independent with initial HEP for ROM/flexibility without increased symptoms  -RA     STG 2 Patient will be independent with education for symptom management, joint protection, and strategies to minimize stress on affected tissues.  -RA     STG 3 Patient will report >/= 25% increased ease and/or decreased pain with ADL's/transitional movements for improved daily function  -RA     Long Term Goals    LTG 1 Patient will be independent with established HEP for strength/stabilization to facilitate self management of condition  -RA     LTG 2 Patient will have  hip/knee strength 4/5 to 4+/5 for increased ease/tolerance with prolonged WB activity   -RA     LTG 3 Patient will report >/= 25% improvement in ability to go up/down stairs for greater ease with community activities  -RA     LTG 4 Patient will have improved score on KOS Activities of Daily Living Scale to >/= 55/80 demonstrating reduced functional limitations.   -RA     Time Calculation    PT Goal Re-Cert Due Date 01/11/18  -RA       User Key  (r) = Recorded By, (t) = Taken By, (c) = Cosigned By    Initials Name Provider Type    RA Marie Bhatt, PT Physical Therapist                PT Assessment/Plan       10/13/17 7272       PT Assessment    Functional Limitations Impaired gait;Limitations in community activities;Performance in leisure activities  -RA     Impairments Pain;Muscle strength;Posture;Impaired flexibility;Edema;Gait;Balance  -RA     Assessment Comments Patient is a 71yo F referred to therapy for R knee pain secondary to arthritis.  She indicates pain has progressed from intermittent with activity to constant aching over the past few months.  She does report generalized arthritis and Fibromyalgia.  She demonstrates mildly altered gait without AD, functional but mild decreased R knee ROM vs L (palpable/audible crepitus noted with R knee ROM), core/hip/knee strength deficits, tightness of HS/calf tissues, and tenderness with palpation of R knee medial/lateral joint line.  She would benefit from skilled therapy for education, ROM/flexibility, strength/stabilization, and manual/modalities prn to help reduce pain, improve function, and facilitate self-management of symptoms/condition.    -RA     Please refer to paper survey for additional self-reported information Yes   KOS-ADL score = 43/80 (where 80/80 = no disability)  -RA     Rehab Potential Good  -RA     Patient/caregiver participated in establishment of treatment plan and goals Yes  -RA     Patient would benefit from skilled therapy intervention Yes   -RA     PT Plan    PT Frequency 1x/week;2x/week  -RA     Predicted Duration of Therapy Intervention (days/wks) 4 weeks  -RA     Planned CPT's? PT EVAL LOW COMPLEXITY: 83890;PT THER PROC EA 15 MIN: 04554;PT MANUAL THERAPY EA 15 MIN: 45287;PT HOT OR COLD PACK TREAT MCARE;PT GAIT TRAINING EA 15 MIN: 06541;PT NEUROMUSC RE-EDUCATION EA 15 MIN: 89220  -RA     PT Plan Comments Skilled PT for knee OA to help reduce pain, improve functional abilities/activity tolerance, and aid in self management of symptoms/condition.   -RA       User Key  (r) = Recorded By, (t) = Taken By, (c) = Cosigned By    Initials Name Provider Type    TONY Bhatt PT Physical Therapist                  Exercises       10/13/17 1100          Exercise 1    Exercise Name 1 QS   -RA      Reps 1 10  -RA      Time (Seconds) 1 5  -RA      Exercise 2    Exercise Name 2 SLR   -RA      Reps 2 10  -RA      Exercise 3    Exercise Name 3 HL hip abd w/ RTB   -RA      Cueing 3 Verbal  -RA      Reps 3 10  -RA      Exercise 4    Exercise Name 4 seated HS curl with YTB   -RA      Cueing 4 Verbal;Demo  -RA      Reps 4 10  -RA      Exercise 5    Exercise Name 5 calf stretch longsitting with strap  -RA      Reps 5 3  -RA      Time (Seconds) 5 20  -RA      Exercise 6    Exercise Name 6 HS stretch seated edge of bed with foot on floor, ankle DF, and bringing torso forward   -RA      Reps 6 3  -RA      Time (Seconds) 6 20  -RA        User Key  (r) = Recorded By, (t) = Taken By, (c) = Cosigned By    Initials Name Provider Type    TONY Bhatt PT Physical Therapist                              Time Calculation:   Start Time: 1035  Stop Time: 1121  Time Calculation (min): 46 min     Therapy Charges for Today     Code Description Service Date Service Provider Modifiers Qty    [id number]  PT [facility] MAIN POS CURRENT 10/13/2017 Marie Bhatt PT GP, CK 1    [id number] HC PT [facility] MAIN POS PROJECTED 10/13/2017 Marie Bhatt PT GP, CJ 1    [id number]  PT EVAL  LOW COMPLEXITY 2 10/13/2017 Marie Bhatt, PT GP 1    [id number] HC PT THER PROC EA 15 MIN 10/13/2017 Marie Bhatt, PT GP 1          PT G-Codes  PT Professional Judgement Used?: Yes  Outcome Measure Options: Knee Outcome Score- ADL  Score: score = 43/80  Functional Limitation: Changing and maintaining body position  Changing and Maintaining Body Position Current Status (): At least 40 percent but less than 60 percent impaired, limited or restricted  Changing and Maintaining Body Position Goal Status (): At least 20 percent but less than 40 percent impaired, limited or restricted         Marie Bhatt, PT  10/13/2017

## 2017-10-18 ENCOUNTER — HOSPITAL ENCOUNTER (OUTPATIENT)
Dept: PHYSICAL THERAPY | Facility: HOSPITAL | Age: 72
Setting detail: THERAPIES SERIES
Discharge: HOME | End: 2017-10-18
Attending: ORTHOPAEDIC SURGERY

## 2017-10-18 DIAGNOSIS — G89.29 CHRONIC PAIN OF RIGHT KNEE: Primary | ICD-10-CM

## 2017-10-18 DIAGNOSIS — M25.561 CHRONIC PAIN OF RIGHT KNEE: Primary | ICD-10-CM

## 2017-10-18 PROCEDURE — 97110 THERAPEUTIC EXERCISES: CPT

## 2017-10-18 NOTE — THERAPY TREATMENT NOTE
Outpatient Physical Therapy Ortho Treatment Note  [facility]     Patient Name: Елена Beach  : 1945  MRN: [id number]  Today's Date: 10/18/2017      Visit Date: 10/18/2017    Visit Dx:    ICD-10-CM ICD-9-CM   1. Chronic pain of right knee M25.561 719.46    G89.29 338.29       Patient Active Problem List   Diagnosis   • Fibromyalgia   • Arthritis   • Hiatal hernia   • Anxiety and depression   • Asthma   • Hyperlipidemia   • Iron deficiency anemia   • Chronic obstructive pulmonary disease   • Pulmonary emphysema   • Chronic fatigue   • Snoring   • Dependence on nicotine from cigarettes   • Insomnia   • Breast cancer screening   • Osteopenia   • Need for hepatitis C screening test   • Disorder of bone    • Pap test, as part of routine gynecological examination        Past Medical History:   Diagnosis Date   • Anemia    • Arthritis    • Asthma    • Fibromyalgia    • Hiatal hernia    • Osteoporosis         Past Surgical History:   Procedure Laterality Date   • FOOT SURGERY Right    • SHOULDER SURGERY Left    • TOE SURGERY     • TOOTH EXTRACTION     • TUBAL ABDOMINAL LIGATION     • WRIST SURGERY               PT Ortho       10/18/17 1800    Subjective Comments    Subjective Comments Pt states she has been ruching today....[employer] volunteer work....  -SI    Subjective Pain    Able to rate subjective pain? yes  -SI    Lower Extremity Flexibility    LE Flexibility Comments HS severely tight bilaterally  -SI      User Key  (r) = Recorded By, (t) = Taken By, (c) = Cosigned By    Initials Name Provider Type    DOROTHY Sampson PTA Physical Therapy Assistant                            PT Assessment/Plan       10/18/17 1818       PT Assessment    Assessment Comments Upgraded HEP but to do once a day vs twice a day.  Discussed use of ice as needed and pacing physical activities  -SI       User Key  (r) = Recorded By, (t) = Taken By, (c) = Cosigned By    Initials Name Provider Type    DOROTHY Sampson  LOIS Physical Therapy Assistant                    Exercises       10/18/17 1800          Subjective Comments    Subjective Comments Pt states she has been ruching today....[employer] volunteer work....  -SI      Subjective Pain    Able to rate subjective pain? yes  -SI      Exercise 1    Exercise Name 1 QS   -SI      Reps 1 20  -SI      Time (Seconds) 1 5  -SI      Exercise 2    Exercise Name 2 SLR   -SI      Sets 2 2  -SI      Reps 2 10  -SI      Exercise 3    Exercise Name 3 HL hip abd w/ RTB   -SI      Cueing 3 Verbal  -SI      Sets 3 2  -SI      Reps 3 10  -SI      Exercise 4    Exercise Name 4 seated HS curl with YTB   -SI      Sets 4 2  -SI      Reps 4 10  -SI      Additional Comments red  -SI      Exercise 5    Exercise Name 5 calf stretch longsitting with strap  -SI      Reps 5 3  -SI      Time (Seconds) 5 20  -SI      Exercise 6    Exercise Name 6 HS stretch  long sitting  -SI      Reps 6 3  -SI      Time (Seconds) 6 20  -SI        User Key  (r) = Recorded By, (t) = Taken By, (c) = Cosigned By    Initials Name Provider Type    DOROTHY Sampson PTA Physical Therapy Assistant                                   Therapy Education       10/18/17 1815          Therapy Education    Given Symptoms/condition management  -SI      Program Progressed  -SI      How Provided Verbal;Demonstration;Written  -SI      Provided to Patient  -SI      Level of Understanding Teach back education performed  -SI        User Key  (r) = Recorded By, (t) = Taken By, (c) = Cosigned By    Initials Name Provider Type    DOROTHY Sampson PTA Physical Therapy Assistant                Time Calculation:   Start Time: 1630  Stop Time: 1715  Time Calculation (min): 45 min    Therapy Charges for Today     Code Description Service Date Service Provider Modifiers Qty    82290891243  PT THER PROC EA 15 MIN 10/18/2017 Yohana Sampson PTA GP 3                    Yohana Sampson PTA  10/18/2017

## 2017-10-20 ENCOUNTER — HOSPITAL ENCOUNTER (OUTPATIENT)
Dept: PHYSICAL THERAPY | Facility: HOSPITAL | Age: 72
Setting detail: THERAPIES SERIES
Discharge: HOME | End: 2017-10-20
Attending: ORTHOPAEDIC SURGERY

## 2017-10-20 DIAGNOSIS — G89.29 CHRONIC PAIN OF RIGHT KNEE: Primary | ICD-10-CM

## 2017-10-20 DIAGNOSIS — M25.561 CHRONIC PAIN OF RIGHT KNEE: Primary | ICD-10-CM

## 2017-10-20 PROCEDURE — 97110 THERAPEUTIC EXERCISES: CPT

## 2017-10-20 NOTE — THERAPY TREATMENT NOTE
Outpatient Physical Therapy Ortho Treatment Note  [facility]     Patient Name: Елена Beach  : 1945  MRN: [id number]  Today's Date: 10/20/2017      Visit Date: 10/20/2017    Visit Dx:    ICD-10-CM ICD-9-CM   1. Chronic pain of right knee M25.561 719.46    G89.29 338.29       Patient Active Problem List   Diagnosis   • Fibromyalgia   • Arthritis   • Hiatal hernia   • Anxiety and depression   • Asthma   • Hyperlipidemia   • Iron deficiency anemia   • Chronic obstructive pulmonary disease   • Pulmonary emphysema   • Chronic fatigue   • Snoring   • Dependence on nicotine from cigarettes   • Insomnia   • Breast cancer screening   • Osteopenia   • Need for hepatitis C screening test   • Disorder of bone    • Pap test, as part of routine gynecological examination        Past Medical History:   Diagnosis Date   • Anemia    • Arthritis    • Asthma    • Fibromyalgia    • Hiatal hernia    • Osteoporosis         Past Surgical History:   Procedure Laterality Date   • FOOT SURGERY Right    • SHOULDER SURGERY Left    • TOE SURGERY     • TOOTH EXTRACTION     • TUBAL ABDOMINAL LIGATION     • WRIST SURGERY               PT Ortho       10/20/17 1200    Subjective Comments    Subjective Comments Pt entered PT saying feeling better but then c/o minor joint pain in multiple locations.  States has taken the meloxicam the past two nights.  -SI    Subjective Pain    Able to rate subjective pain? yes  -SI    Right Knee    Extension/Flexion ROM Details -2 to 128  -SI    Right Hip    Hip Flexion Gross Movement (4/5) good  -SI    Hip ABduction Gross Movement (4/5) good  -SI    Right Knee    Knee Extension Gross Movement (4/5) good  -SI    Stairs Assessment/Treatment    Stairs, Comment reports non reciprocal on occassions knee hurting  -SI      10/18/17 1800    Subjective Comments    Subjective Comments Pt states she has been ruching today....[employer] volunteer work....  -SI    Subjective Pain    Able to rate  subjective pain? yes  -SI    Lower Extremity Flexibility    LE Flexibility Comments HS severely tight bilaterally  -SI      User Key  (r) = Recorded By, (t) = Taken By, (c) = Cosigned By    Initials Name Provider Type    DOROTHY Sampson PTA Physical Therapy Assistant                            PT Assessment/Plan       10/20/17 1240       PT Assessment    Assessment Comments ROM is excellent.  needs strengthening.  Two more visits over the next two weeks as pt will be oot  -SI       User Key  (r) = Recorded By, (t) = Taken By, (c) = Cosigned By    Initials Name Provider Type    DOROTHY Sampson PTA Physical Therapy Assistant                    Exercises       10/20/17 1200          Subjective Comments    Subjective Comments Pt entered PT saying feeling better but then c/o minor joint pain in multiple locations.  States has taken the meloxicam the past two nights.  -SI      Subjective Pain    Able to rate subjective pain? yes  -SI      Exercise 1    Exercise Name 1 QS   -SI      Reps 1 20  -SI      Time (Seconds) 1 5  -SI      Exercise 2    Exercise Name 2 SLR   -SI      Sets 2 2  -SI      Reps 2 10  -SI      Exercise 3    Exercise Name 3 HL hip abd/ER  -SI      Cueing 3 Verbal  -SI      Sets 3 2  -SI      Reps 3 10  -SI      Additional Comments green  -SI      Exercise 4    Exercise Name 4 seated HS curl with YTB   -SI      Cueing 4 Verbal;Demo  -SI      Sets 4 2  -SI      Reps 4 10  -SI      Additional Comments red  -SI      Exercise 5    Exercise Name 5 calf stretch longsitting with strap  -SI      Reps 5 3  -SI      Time (Seconds) 5 20  -SI      Exercise 6    Exercise Name 6 HS stretch  long sitting  -SI      Reps 6 3  -SI      Time (Seconds) 6 20  -SI      Additional Comments To do on both  -SI      Exercise 7    Exercise Name 7 Hip abd on side  -SI      Sets 7 2  -SI      Reps 7 10  -SI        User Key  (r) = Recorded By, (t) = Taken By, (c) = Cosigned By    Initials Name Provider Type    DOROTHY Sampson  LOIS Physical Therapy Assistant                                   Therapy Education       10/20/17 1239          Therapy Education    Given HEP;Symptoms/condition management  -SI      Program Progressed  -SI      How Provided Verbal;Demonstration;Written  -SI      Provided to Patient  -SI      Level of Understanding Teach back education performed  -SI        User Key  (r) = Recorded By, (t) = Taken By, (c) = Cosigned By    Initials Name Provider Type    DOROTHY Sampson PTA Physical Therapy Assistant                Time Calculation:   Start Time: 1145  Stop Time: 1230  Time Calculation (min): 45 min    Therapy Charges for Today     Code Description Service Date Service Provider Modifiers Qty    77632722840  PT THER PROC EA 15 MIN 10/20/2017 Yohana Sampson PTA GP 3                    Yohana Sampson PTA  10/20/2017

## 2017-10-24 ENCOUNTER — HOSPITAL ENCOUNTER (OUTPATIENT)
Dept: PHYSICAL THERAPY | Facility: HOSPITAL | Age: 72
Setting detail: THERAPIES SERIES
Discharge: HOME | End: 2017-10-24
Attending: ORTHOPAEDIC SURGERY

## 2017-10-24 DIAGNOSIS — G89.29 CHRONIC PAIN OF RIGHT KNEE: Primary | ICD-10-CM

## 2017-10-24 DIAGNOSIS — M25.561 CHRONIC PAIN OF RIGHT KNEE: Primary | ICD-10-CM

## 2017-10-24 PROCEDURE — 97110 THERAPEUTIC EXERCISES: CPT

## 2017-10-24 NOTE — THERAPY TREATMENT NOTE
Outpatient Physical Therapy Ortho Treatment Note  [facility]     Patient Name: Елена Beach  : 1945  MRN: [id number]  Today's Date: 10/24/2017      Visit Date: 10/24/2017    Visit Dx:    ICD-10-CM ICD-9-CM   1. Chronic pain of right knee M25.561 719.46    G89.29 338.29       Patient Active Problem List   Diagnosis   • Fibromyalgia   • Arthritis   • Hiatal hernia   • Anxiety and depression   • Asthma   • Hyperlipidemia   • Iron deficiency anemia   • Chronic obstructive pulmonary disease   • Pulmonary emphysema   • Chronic fatigue   • Snoring   • Dependence on nicotine from cigarettes   • Insomnia   • Breast cancer screening   • Osteopenia   • Need for hepatitis C screening test   • Disorder of bone    • Pap test, as part of routine gynecological examination        Past Medical History:   Diagnosis Date   • Anemia    • Arthritis    • Asthma    • Fibromyalgia    • Hiatal hernia    • Osteoporosis         Past Surgical History:   Procedure Laterality Date   • FOOT SURGERY Right    • SHOULDER SURGERY Left    • TOE SURGERY     • TOOTH EXTRACTION     • TUBAL ABDOMINAL LIGATION     • WRIST SURGERY               PT Ortho       10/24/17 1700    Subjective Comments    Subjective Comments pt enters today saying her knee feels great.  -SI    Subjective Pain    Able to rate subjective pain? yes  -SI    Right Hip    Hip Flexion Gross Movement (4/5) good  -SI    Hip ABduction Gross Movement (4+/5) good plus  -SI    Right Knee    Knee Extension Gross Movement (4/5) good  -SI    Knee Flexion Gross Movement (4+/5) good plus  -SI    Gait Assessment/Treatment    Gait, Comment normal level surfaces  -SI    Stairs Assessment/Treatment    Number of Stairs 12  -SI    Stairs, Handrail Location left side (ascending)  -SI    Stairs, Nuckolls Level independent  -SI    Stairs, Comment able to do stairs reciprocally and discussed non reciprocal as needed on a painful flare-up  -SI      User Key  (r) = Recorded By, (t) =  Taken By, (c) = Cosigned By    Initials Name Provider Type    DOROTHY Yohana CHRISTIAN Sampson PTA Physical Therapy Assistant                            PT Assessment/Plan       10/24/17 1714       PT Assessment    Assessment Comments pt admittingly doesn't like to ex.  Her sx are better and she is willing to do what necessary .  HS very tight bilaterally.  Knee ROM is good.  One more session next week and plan to DC on HEP.  Final upgrades to HEP today.  -SI       User Key  (r) = Recorded By, (t) = Taken By, (c) = Cosigned By    Initials Name Provider Type    DOROTHY Yohana CHRISTIAN Sampson PTA Physical Therapy Assistant                    Exercises       10/24/17 1700          Subjective Comments    Subjective Comments pt enters today saying her knee feels great.  -SI      Subjective Pain    Able to rate subjective pain? yes  -SI      Exercise 1    Exercise Name 1 QS   -SI      Reps 1 20  -SI      Time (Seconds) 1 5  -SI      Exercise 2    Exercise Name 2 SLR   -SI      Sets 2 2  -SI      Reps 2 10  -SI      Additional Comments 1lb  -SI      Exercise 3    Exercise Name 3 HL hip abd/ER  -SI      Cueing 3 Verbal  -SI      Sets 3 2  -SI      Reps 3 10  -SI      Additional Comments green  -SI      Exercise 4    Exercise Name 4 seated HS curl with YTB   -SI      Cueing 4 Verbal;Demo  -SI      Sets 4 2  -SI      Reps 4 10  -SI      Additional Comments green  -SI      Exercise 5    Exercise Name 5 calf stretch longsitting with strap  -SI      Reps 5 3  -SI      Time (Seconds) 5 20  -SI      Exercise 6    Exercise Name 6 HS stretch  long sitting  -SI      Reps 6 3  -SI      Time (Seconds) 6 20  -SI      Exercise 7    Exercise Name 7 Hip abd on side  -SI      Sets 7 2  -SI      Reps 7 10  -SI      Additional Comments 1 lb  -SI        User Key  (r) = Recorded By, (t) = Taken By, (c) = Cosigned By    Initials Name Provider Type    DOROTHY Yohanasuzie Sampson PTA Physical Therapy Assistant                                   Therapy Education       10/24/17 8921           Therapy Education    Given HEP;Symptoms/condition management  -SI      Program Progressed  -SI      How Provided Verbal;Demonstration;Written  -SI      Provided to Patient  -SI      Level of Understanding Teach back education performed  -SI        User Key  (r) = Recorded By, (t) = Taken By, (c) = Cosigned By    Initials Name Provider Type    DOROTHY Sampson PTA Physical Therapy Assistant                Time Calculation:   Start Time: 1630  Stop Time: 1715  Time Calculation (min): 45 min    Therapy Charges for Today     Code Description Service Date Service Provider Modifiers Qty    63797925860 HC PT THER PROC EA 15 MIN 10/24/2017 Yohana Sampson PTA GP 3                    Yohana Sampson PTA  10/24/2017

## 2017-11-01 ENCOUNTER — HOSPITAL ENCOUNTER (OUTPATIENT)
Dept: PHYSICAL THERAPY | Facility: HOSPITAL | Age: 72
Setting detail: THERAPIES SERIES
Discharge: HOME | End: 2017-11-01
Attending: ORTHOPAEDIC SURGERY

## 2017-11-01 DIAGNOSIS — M25.561 CHRONIC PAIN OF RIGHT KNEE: Primary | ICD-10-CM

## 2017-11-01 DIAGNOSIS — G89.29 CHRONIC PAIN OF RIGHT KNEE: Primary | ICD-10-CM

## 2017-11-01 PROCEDURE — G8982 BODY POS GOAL STATUS: HCPCS | Performed by: PHYSICAL THERAPIST

## 2017-11-01 PROCEDURE — 97110 THERAPEUTIC EXERCISES: CPT | Performed by: PHYSICAL THERAPIST

## 2017-11-01 PROCEDURE — G8983 BODY POS D/C STATUS: HCPCS | Performed by: PHYSICAL THERAPIST

## 2017-11-01 NOTE — THERAPY DISCHARGE NOTE
Outpatient Physical Therapy Ortho Treatment Note/Discharge Summary  [facility]     Patient Name: Елена Beach  : 1945  MRN: [id number]  Today's Date: 2017      Visit Date: 2017    Visit Dx:    ICD-10-CM ICD-9-CM   1. Chronic pain of right knee M25.561 719.46    G89.29 338.29       Patient Active Problem List   Diagnosis   • Fibromyalgia   • Arthritis   • Hiatal hernia   • Anxiety and depression   • Asthma   • Hyperlipidemia   • Iron deficiency anemia   • Chronic obstructive pulmonary disease   • Pulmonary emphysema   • Chronic fatigue   • Snoring   • Dependence on nicotine from cigarettes   • Insomnia   • Breast cancer screening   • Osteopenia   • Need for hepatitis C screening test   • Disorder of bone    • Pap test, as part of routine gynecological examination        Past Medical History:   Diagnosis Date   • Anemia    • Arthritis    • Asthma    • Fibromyalgia    • Hiatal hernia    • Osteoporosis         Past Surgical History:   Procedure Laterality Date   • FOOT SURGERY Right    • SHOULDER SURGERY Left    • TOE SURGERY     • TOOTH EXTRACTION     • TUBAL ABDOMINAL LIGATION     • WRIST SURGERY               PT Ortho       17 1300    Right Hip    Hip Flexion Gross Movement (4/5) good;(4+/5) good plus  -RA    Hip Extension Gross Movement (4-/5) good minus;(4/5) good  -RA    Hip ABduction Gross Movement (4+/5) good plus  -RA    Hip Ext (Lat) Rotation Gross Movement (4/5) good  -RA    Right Knee    Knee Extension Gross Movement (4/5) good  -RA    Knee Flexion Gross Movement (4+/5) good plus  -RA      User Key  (r) = Recorded By, (t) = Taken By, (c) = Cosigned By    Initials Name Provider Type    TONY Bhatt PT Physical Therapist                            PT Assessment/Plan       17 1308       PT Assessment    Assessment Comments Patient has tolerated progression of strengthening ex well and states she can tell the ex have helped.  She still has some pain/difficulty  with sit<->stand and stairs but indicates both have improved some.  She is independent with current HEP and understands the importance of performing stretches daily and strengthening ex every other day (or 3x week) going forward  -RA     PT Plan    PT Plan Comments D/c to independent home program for self management of symptoms/condition.  Patient to call with any questions/concerns or if she feels need to return to therapy.  -RA       User Key  (r) = Recorded By, (t) = Taken By, (c) = Cosigned By    Initials Name Provider Type    TONY Bhatt, PT Physical Therapist                    Exercises       11/01/17 1200          Subjective Comments    Subjective Comments Can tell the exercises have helped.  [facility] [place] [facility] did okay with [facility].  Think today is my last day.    -RA      Subjective Pain    Able to rate subjective pain? yes  -RA      Exercise 1    Exercise Name 1 QS   -RA      Reps 1 20  -RA      Time (Seconds) 1 5  -RA      Exercise 2    Exercise Name 2 SLR   -RA      Sets 2 2  -RA      Reps 2 10  -RA      Additional Comments 1#  -RA      Exercise 3    Exercise Name 3 HL hip abd/ER  -RA      Cueing 3 Verbal  -RA      Sets 3 2  -RA      Reps 3 10  -RA      Additional Comments green  -RA      Exercise 4    Exercise Name 4 seated HS curl with TB   -RA      Cueing 4 Verbal;Demo  -RA      Sets 4 2  -RA      Reps 4 10  -RA      Additional Comments green  -RA      Exercise 5    Exercise Name 5 calf stretch longsitting with strap  -RA      Reps 5 3  -RA      Time (Seconds) 5 20  -RA      Exercise 6    Exercise Name 6 HS stretch  long sitting  -RA      Reps 6 3  -RA      Time (Seconds) 6 20  -RA      Exercise 7    Exercise Name 7 Hip abd on side  -RA      Sets 7 2  -RA      Reps 7 10  -RA      Additional Comments 1#  -RA        User Key  (r) = Recorded By, (t) = Taken By, (c) = Cosigned By    Initials Name Provider Type    TONY Bhatt, PT Physical Therapist                                PT OP Goals       11/01/17 1300       PT Short Term Goals    STG 1 Patient will be independent with initial HEP for ROM/flexibility without increased symptoms  -RA     STG 1 Progress Met  -RA     STG 2 Patient will be independent with education for symptom management, joint protection, and strategies to minimize stress on affected tissues.  -RA     STG 2 Progress Met  -RA     STG 3 Patient will report >/= 25% increased ease and/or decreased pain with ADL's/transitional movements for improved daily function  -RA     STG 3 Progress Met  -RA     Long Term Goals    LTG 1 Patient will be independent with established HEP for strength/stabilization to facilitate self management of condition  -RA     LTG 1 Progress Met  -RA     LTG 2 Patient will have hip/knee strength 4/5 to 4+/5 for increased ease/tolerance with prolonged WB activity   -RA     LTG 2 Progress Met  -RA     LTG 3 Patient will report >/= 25% improvement in ability to go up/down stairs for greater ease with community activities  -RA     LTG 3 Progress Met  -RA     LTG 4 Patient will have improved score on KOS Activities of Daily Living Scale to >/= 55/80 demonstrating reduced functional limitations.   -RA     LTG 4 Progress Not Met  -RA     LTG 4 Progress Comments socre 47/80 - note improved score for items such as walking, standing, sitting with knee bent, rising from sitting, and up/down stairs,   -RA       User Key  (r) = Recorded By, (t) = Taken By, (c) = Cosigned By    Initials Name Provider Type    RA Marie Bhatt, PT Physical Therapist                Therapy Education       11/01/17 1312          Therapy Education    Given HEP;Symptoms/condition management  -RA      Program Reinforced  -RA      How Provided Verbal;Demonstration  -RA      Provided to Patient  -RA      Level of Understanding Teach back education performed;Verbalized  -RA        User Key  (r) = Recorded By, (t) = Taken By, (c) = Cosigned By    Initials Name  Provider Type    RA Marie Bhatt PT Physical Therapist                Time Calculation:   Start Time: 1145  Stop Time: 1228  Time Calculation (min): 43 min    Therapy Charges for Today     Code Description Service Date Service Provider Modifiers Qty    73913955059 HC PT CHNG MAIN POS PROJECTED 11/1/2017 Marie Bhatt, PT GP, CJ 1    38681030301 HC PT CHNG MAIN POS DISCHARGE 11/1/2017 Marie Bhatt, PT GP, CJ 1    72920618413 HC PT THER PROC EA 15 MIN 11/1/2017 Marie Bhatt, PT GP 3          PT G-Codes  PT Professional Judgement Used?: Yes  Outcome Measure Options: Knee Outcome Score- ADL  Score: score 47/80 (score for walking, standing, up/down stairs, sitting with knee bent, and rising from chair all improved since eval)   Functional Limitation: Changing and maintaining body position  Changing and Maintaining Body Position Goal Status (): At least 20 percent but less than 40 percent impaired, limited or restricted  Changing and Maintaining Body Position Discharge Status (): At least 20 percent but less than 40 percent impaired, limited or restricted     OP PT Discharge Summary  Date of Discharge: 11/01/17  Reason for Discharge: Independent  Outcomes Achieved: Patient able to partially acheive established goals (All goals met except for KOS-ADL score )  Discharge Destination: Home with home program  Discharge Instructions: Patient instructed in importance of continuing with HEP - performing stretches daily and strengthening ex every other day (or 3x week) going forward.  She will call should she have any questions/concerns or feels need to return to therapy      Marie Bhatt PT  11/1/2017

## 2017-12-15 ENCOUNTER — TRANSCRIBE ORDERS (OUTPATIENT)
Dept: ADMINISTRATIVE | Facility: HOSPITAL | Age: 72
End: 2017-12-15

## 2017-12-15 DIAGNOSIS — Z12.31 VISIT FOR SCREENING MAMMOGRAM: Primary | ICD-10-CM

## 2018-01-25 ENCOUNTER — HOSPITAL ENCOUNTER (OUTPATIENT)
Dept: MAMMOGRAPHY | Facility: HOSPITAL | Age: 73
Discharge: HOME | End: 2018-01-25
Admitting: NURSE PRACTITIONER

## 2018-01-25 DIAGNOSIS — Z12.31 VISIT FOR SCREENING MAMMOGRAM: ICD-10-CM

## 2018-01-25 PROCEDURE — 77067 SCR MAMMO BI INCL CAD: CPT

## 2018-01-25 PROCEDURE — 77063 BREAST TOMOSYNTHESIS BI: CPT

## 2018-02-18 DIAGNOSIS — F32.A ANXIETY AND DEPRESSION: ICD-10-CM

## 2018-02-18 DIAGNOSIS — F41.9 ANXIETY AND DEPRESSION: ICD-10-CM

## 2018-02-18 DIAGNOSIS — E78.49 OTHER HYPERLIPIDEMIA: ICD-10-CM

## 2018-02-19 RX ORDER — ATORVASTATIN CALCIUM 10 MG/1
TABLET, FILM COATED ORAL
Qty: 90 TABLET | Refills: 3 | Status: SHIPPED | OUTPATIENT
Start: 2018-02-19 | End: 2018-12-13

## 2018-02-19 RX ORDER — DULOXETIN HYDROCHLORIDE 60 MG/1
CAPSULE, DELAYED RELEASE ORAL
Qty: 90 CAPSULE | Refills: 3 | Status: SHIPPED | OUTPATIENT
Start: 2018-02-19 | End: 2018-12-13

## 2018-02-20 ENCOUNTER — OFFICE VISIT (OUTPATIENT)
Dept: FAMILY MEDICINE CLINIC | Facility: CLINIC | Age: 73
End: 2018-02-20

## 2018-02-20 ENCOUNTER — TRANSCRIBE ORDERS (OUTPATIENT)
Dept: CARDIAC REHAB | Facility: HOSPITAL | Age: 73
End: 2018-02-20

## 2018-02-20 VITALS
OXYGEN SATURATION: 95 % | BODY MASS INDEX: 18.25 KG/M2 | HEART RATE: 94 BPM | SYSTOLIC BLOOD PRESSURE: 112 MMHG | TEMPERATURE: 97.5 F | HEIGHT: 63 IN | WEIGHT: 103 LBS | DIASTOLIC BLOOD PRESSURE: 72 MMHG

## 2018-02-20 DIAGNOSIS — J44.9 CHRONIC OBSTRUCTIVE PULMONARY DISEASE, UNSPECIFIED COPD TYPE (HCC): ICD-10-CM

## 2018-02-20 DIAGNOSIS — R53.82 CHRONIC FATIGUE: ICD-10-CM

## 2018-02-20 DIAGNOSIS — D50.8 OTHER IRON DEFICIENCY ANEMIA: Primary | ICD-10-CM

## 2018-02-20 DIAGNOSIS — E78.5 HYPERLIPIDEMIA, UNSPECIFIED HYPERLIPIDEMIA TYPE: ICD-10-CM

## 2018-02-20 DIAGNOSIS — J44.9 COPD, SEVERE (HCC): Primary | ICD-10-CM

## 2018-02-20 DIAGNOSIS — Z13.1 SCREENING FOR DIABETES MELLITUS: ICD-10-CM

## 2018-02-20 PROBLEM — Z01.419 PAP TEST, AS PART OF ROUTINE GYNECOLOGICAL EXAMINATION: Status: RESOLVED | Noted: 2017-02-22 | Resolved: 2018-02-20

## 2018-02-20 PROBLEM — M89.9 DISORDER OF BONE: Status: RESOLVED | Noted: 2017-02-21 | Resolved: 2018-02-20

## 2018-02-20 PROBLEM — Z11.59 NEED FOR HEPATITIS C SCREENING TEST: Status: RESOLVED | Noted: 2017-02-21 | Resolved: 2018-02-20

## 2018-02-20 PROBLEM — Z12.39 BREAST CANCER SCREENING: Status: RESOLVED | Noted: 2017-01-12 | Resolved: 2018-02-20

## 2018-02-20 PROCEDURE — 99214 OFFICE O/P EST MOD 30 MIN: CPT | Performed by: NURSE PRACTITIONER

## 2018-02-20 NOTE — PROGRESS NOTES
"Subjective   Еелна Beach is a 72 y.o. female.     History of Present Illness   Patient presenting to the office today with complaints of fatigue and shortness of breath she does have COPD and sees a pulmonologist who she saw in January and she changed her Symbicort to the Breo.  She states she's not been on a new inhaler for a week yet she doesn't feel it is working very well she doesn't like it.  She is using her rescue inhaler quite often she got from someone after being sick in early January he does help open up her lungs and it works quickly.  She is also using her Spiriva as directed.  She's not exercising any chest pain headaches or nausea.    She is also needing up dated labs for hyperlipidemia and is taking meds as directed without SE  The following portions of the patient's history were reviewed and updated as appropriate: allergies, current medications, past social history and problem list.    Review of Systems   Respiratory: Positive for shortness of breath.        Objective   /72  Pulse 94  Temp 97.5 °F (36.4 °C) (Oral)   Ht 158.8 cm (62.5\")  Wt 46.7 kg (103 lb)  SpO2 95%  BMI 18.54 kg/m2  Physical Exam   Constitutional: She is oriented to person, place, and time. Vital signs are normal. She appears well-developed and well-nourished. No distress.   HENT:   Head: Normocephalic.   Cardiovascular: Normal rate, regular rhythm and normal heart sounds.    Pulmonary/Chest: Effort normal and breath sounds normal.   Neurological: She is alert and oriented to person, place, and time. Gait normal.   Psychiatric: She has a normal mood and affect. Her behavior is normal. Judgment and thought content normal.   Vitals reviewed.      Assessment/Plan   Problem List Items Addressed This Visit        Cardiovascular and Mediastinum    Hyperlipidemia    Relevant Orders    Comprehensive Metabolic Panel    Lipid Panel With LDL / HDL Ratio       Respiratory    Chronic obstructive pulmonary disease    Relevant " Medications    BREO ELLIPTA 200-25 MCG/INH aerosol powder        Hematopoietic and Hemostatic    Iron deficiency anemia - Primary    Relevant Orders    CBC & Differential    Iron Profile       Other    Chronic fatigue    Screening for diabetes mellitus    Relevant Orders    Comprehensive Metabolic Panel        She does have a history of iron deficiency anemia and has not been checked in quite a while she does take daily iron.  We will recheck labs she is to follow up after that.  I advised her to get in to see her pulmonologist as soon as possible considering that her shortness of breath has not been relieved with the new inhaler.  Her next appointment with him is in July that she is going to go ahead and make an appointment sooner than that.

## 2018-02-21 LAB
ALBUMIN SERPL-MCNC: 3.8 G/DL (ref 3.5–4.8)
ALBUMIN/GLOB SERPL: 1.4 {RATIO} (ref 1.2–2.2)
ALP SERPL-CCNC: 120 IU/L (ref 39–117)
ALT SERPL-CCNC: 14 IU/L (ref 0–32)
AST SERPL-CCNC: 26 IU/L (ref 0–40)
BASOPHILS # BLD AUTO: 0 X10E3/UL (ref 0–0.2)
BASOPHILS NFR BLD AUTO: 1 %
BILIRUB SERPL-MCNC: 0.3 MG/DL (ref 0–1.2)
BUN SERPL-MCNC: 6 MG/DL (ref 8–27)
BUN/CREAT SERPL: 10 (ref 12–28)
CALCIUM SERPL-MCNC: 9.3 MG/DL (ref 8.7–10.3)
CHLORIDE SERPL-SCNC: 98 MMOL/L (ref 96–106)
CHOLEST SERPL-MCNC: 144 MG/DL (ref 100–199)
CO2 SERPL-SCNC: 24 MMOL/L (ref 18–29)
CREAT SERPL-MCNC: 0.58 MG/DL (ref 0.57–1)
EOSINOPHIL # BLD AUTO: 1 X10E3/UL (ref 0–0.4)
EOSINOPHIL NFR BLD AUTO: 18 %
ERYTHROCYTE [DISTWIDTH] IN BLOOD BY AUTOMATED COUNT: 13 % (ref 12.3–15.4)
FERRITIN SERPL-MCNC: 31 NG/ML (ref 15–150)
GFR SERPLBLD CREATININE-BSD FMLA CKD-EPI: 107 ML/MIN/{1.73_M2}
GFR SERPLBLD CREATININE-BSD FMLA CKD-EPI: 93 ML/MIN/{1.73_M2}
GLOBULIN SER CALC-MCNC: 2.8 G/L (ref 1.5–4.5)
GLUCOSE SERPL-MCNC: 129 MG/DL (ref 65–99)
HCT VFR BLD AUTO: 39.4 % (ref 34–46.6)
HDLC SERPL-MCNC: 70 MG/DL
HGB BLD-MCNC: 13.2 G/DL (ref 11.1–15.9)
IMM GRANULOCYTES # BLD: 0 X10E3/UL (ref 0–0.1)
IMM GRANULOCYTES NFR BLD: 0 %
IRON SATN MFR SERPL: 34 % (ref 15–55)
IRON SERPL-MCNC: 106 UG/DL (ref 27–139)
LDLC SERPL CALC-MCNC: 62 MG/DL (ref 0–99)
LDLC/HDLC SERPL: 0.9 {RATIO} (ref 0–3.2)
LYMPHOCYTES # BLD AUTO: 1.8 X10E3/UL (ref 0.7–3.1)
LYMPHOCYTES NFR BLD AUTO: 33 %
MCH RBC QN AUTO: 31.1 PG (ref 26.6–33)
MCHC RBC AUTO-ENTMCNC: 33.5 G/DL (ref 31.5–35.7)
MCV RBC AUTO: 93 FL (ref 79–97)
MONOCYTES # BLD AUTO: 0.4 X10E3/UL (ref 0.1–0.9)
MONOCYTES NFR BLD AUTO: 8 %
NEUTROPHILS # BLD AUTO: 2.2 X10E3/UL (ref 1.4–7)
NEUTROPHILS NFR BLD AUTO: 40 %
PLATELET # BLD AUTO: 321 X10E3/UL (ref 150–379)
POTASSIUM SERPL-SCNC: 3.9 MMOL/L (ref 3.5–5.2)
PROT SERPL-MCNC: 6.6 G/DL (ref 6–8.5)
RBC # BLD AUTO: 4.24 X10E6/UL (ref 3.77–5.28)
SODIUM SERPL-SCNC: 137 MMOL/L (ref 134–144)
TIBC SERPL-MCNC: 312 UG/DL (ref 250–450)
TRIGL SERPL-MCNC: 61 MG/DL (ref 0–149)
UIBC SERPL-MCNC: 206 UG/DL (ref 118–369)
VLDLC SERPL CALC-MCNC: 12 MG/DL (ref 5–40)
WBC # BLD AUTO: 5.4 X10E3/UL (ref 3.4–10.8)

## 2018-02-22 ENCOUNTER — PREP FOR SURGERY (OUTPATIENT)
Dept: OTHER | Facility: HOSPITAL | Age: 73
End: 2018-02-22

## 2018-02-22 DIAGNOSIS — K21.9 GASTROESOPHAGEAL REFLUX DISEASE, ESOPHAGITIS PRESENCE NOT SPECIFIED: Primary | ICD-10-CM

## 2018-02-22 RX ORDER — SODIUM CHLORIDE, SODIUM LACTATE, POTASSIUM CHLORIDE, CALCIUM CHLORIDE 600; 310; 30; 20 MG/100ML; MG/100ML; MG/100ML; MG/100ML
30 INJECTION, SOLUTION INTRAVENOUS CONTINUOUS
Status: CANCELLED | OUTPATIENT
Start: 2018-02-22

## 2018-02-23 ENCOUNTER — APPOINTMENT (OUTPATIENT)
Dept: CARDIAC REHAB | Facility: HOSPITAL | Age: 73
End: 2018-02-23

## 2018-02-23 LAB
HBA1C MFR BLD: 6 % (ref 4.8–5.6)
Lab: NORMAL
WRITTEN AUTHORIZATION: NORMAL

## 2018-03-09 ENCOUNTER — OFFICE VISIT (OUTPATIENT)
Dept: CARDIAC REHAB | Facility: HOSPITAL | Age: 73
End: 2018-03-09

## 2018-03-09 VITALS
WEIGHT: 102.8 LBS | HEART RATE: 93 BPM | BODY MASS INDEX: 18.92 KG/M2 | RESPIRATION RATE: 20 BRPM | SYSTOLIC BLOOD PRESSURE: 110 MMHG | OXYGEN SATURATION: 97 % | HEIGHT: 62 IN | DIASTOLIC BLOOD PRESSURE: 70 MMHG

## 2018-03-09 DIAGNOSIS — J44.9 COPD, SEVERE (HCC): Primary | ICD-10-CM

## 2018-03-09 PROCEDURE — G0424 PULMONARY REHAB W EXER: HCPCS

## 2018-03-09 NOTE — PROGRESS NOTES
Pulmonary Rehab Initial Assessment      Name: Елена Beach  :1945 Allergies:Codeine; Ibuprofen; Keflex [cephalexin]; and Pseudoephedrine   MRN: [id number] 72 y.o. Physician: BEATRIS Collins   Primary Diagnosis:    Diagnosis Plan   1. COPD, severe      Event Date: 3-9-2018 Specialist: Izabel Villa MD   Secondary Diagnosis: Asthma  Note Author: Fina Arreola RN     Cardiovascular History: Hyperlipidemia     EXERCISE AT HOME  no  n/a  N/A          Ambulatory Status:Independent  Ambulatory Fall Risk Assessed on Initial Visit: no 6 Minute Walk Pre- Pulmonary Rehab:  Distance:1091ft      RPE:3        RPD: 3              MPH: 2.1  Max. HR: 119        SPO2:95    MET: 2.6  Resting BP: 110/70     Peak BP: 120/78  Recovery BP: 114/76  Comments: Pt c/o coughing after walk.       NUTRITION  Lipids:yes If yes, labs as follows;  Total: No components found for: CHOLESTEROL  HDL:   HDL Cholesterol   Date Value Ref Range Status   2018 70 >39 mg/dL Final    Lipids continued:  LDL:  LDL Cholesterol    Date Value Ref Range Status   2018 62 0 - 99 Final     Triglyceride: No components found for: TRIGLYCERIDE   Weight Management:                 Weight: 102.8  Height: 62                                   BMI: Body mass index is 18.8 kg/(m^2).  Waist Circumference: n/a inches   Alcohol Use: social drinker Diabetes:No    Last HGBA1C with date if applicable:No components found for: A1C         SOCIAL HISTORY  Social History     Social History   • Marital status: Single   • Number of children: 1     Occupational History   • [employer]     Social History Main Topics   • Smoking status: Former Smoker     Packs/day: 1.00     Years: 40.00     Types: Cigarettes     Quit date: 2016   • Smokeless tobacco: Former User   • Alcohol use Yes      Comment: social   • Drug use: No   • Sexual activity: Defer    Learning Barriers:Ready to Learn  Family Support:yes  Living Arrangement: lives alone Tobacco  Adjunct:no  Do you live with a smoker: no     PSYCHOSOCIAL  Clinical Depression: no    Stress: yes     Assess presence or absence of depression using a valid screening tool: yes      Assessment: Lungs clear bilat.  Heart sounds normal           Are you being hurt, hit, or freightened by anyone at home or in your life? no    Are you being neglected by a caregiver? No Shoulder flexibility/Range of motion: Excellent     Recommended arm activity: Hand weights    Chair sit and reach within: 3 inches   Leg flexibility: Below average    Leg Strength/Balance/Five times sit to stand: 20 seconds. Pt used upper body to help with sit to stands.    Recommended stretching: Standing   Balance: Average Assessment: Pt states all her joints and muscles ache due to FMS    Family attends IA: no      COMORBIDITIES  Sleep Apnea: no If yes, Choose: N/A    Cancer: no    Stroke: no   Pneumonia: yes If yes, how many times 2    Osteoporosis: yes    GI Problems: yes Frequent colds/allergies: yes    Other illnesses, surgeries, or comments toe surgery, wrist surgery, Fibromyalgia, arthritis, hiatal hernia, low iron, chronic fatigue, osteopenia     PAIN:  Are you having pain? no  If yes where is pain? n/a If yes, pain scale: n/a      PULMONARY:  Do you use a nebulizer?: no   If yes, n/a    Do you use oxygen at home?: no  If yes, amount n/a    With rest: no  With activity: no Do you have a daily cough?: yes  If yes, choose: productive of clear sputum    Do you every notice yourself wheezing?: yes  If yes, when: After coughing and trying to cath her breath Other pulmonary/breathing problems?: no   OTHER:  Do you have physical limitations?: yes  If yes, Pt states she is stiff. She has joint and muscle pain due to fibromyalgia    Do you need assistance with ADLs?: no  If yes, n/a Do you climb stairs at home?:no  If yes, how many times 2 times a week at [facility]    Have you ever attended a pulmonary rehab?: no  If yes, n/a [facility] Dyspnea Scale: 0 - 4:  2 =  walks slower than people of the same age on the level because of breathlessness, or has to stop for breath when walking at own pace on the level     Patient Goals: Pt would like to be able to keep up with her friends. She would like to learn how to breath better and control breath. She would like to walk the neighborhood.      DISCHARGE PLANNING:  Do you have any home exercise equipment?: yes    What are you plans for continuing exercise after completion of pulmonary rehab? Pt has a workout room at her [facility].     EDUCATION:  Pursed - lip breathing and Program information folder       PRE-PROGRAM ASSESSMENT:  PFT Date: 7-    FEV1/FVC: 48 FEV1: 49    FVC: 78 DLCO: n/a     Time of arrival: 10:00    Time of departure: 11:30           3/9/2018  10:26 AM  Fina Arreola RN

## 2018-03-15 ENCOUNTER — TREATMENT (OUTPATIENT)
Dept: CARDIAC REHAB | Facility: HOSPITAL | Age: 73
End: 2018-03-15

## 2018-03-15 DIAGNOSIS — J44.9 COPD, SEVERE (HCC): Primary | ICD-10-CM

## 2018-03-15 PROCEDURE — G0424 PULMONARY REHAB W EXER: HCPCS

## 2018-03-19 ENCOUNTER — OUTSIDE FACILITY SERVICE (OUTPATIENT)
Dept: GASTROENTEROLOGY | Facility: CLINIC | Age: 73
End: 2018-03-19

## 2018-03-19 PROCEDURE — 43239 EGD BIOPSY SINGLE/MULTIPLE: CPT | Performed by: INTERNAL MEDICINE

## 2018-03-20 ENCOUNTER — TREATMENT (OUTPATIENT)
Dept: CARDIAC REHAB | Facility: HOSPITAL | Age: 73
End: 2018-03-20

## 2018-03-20 DIAGNOSIS — J44.9 COPD, SEVERE (HCC): Primary | ICD-10-CM

## 2018-03-20 PROCEDURE — G0424 PULMONARY REHAB W EXER: HCPCS

## 2018-03-22 ENCOUNTER — TREATMENT (OUTPATIENT)
Dept: CARDIAC REHAB | Facility: HOSPITAL | Age: 73
End: 2018-03-22

## 2018-03-22 DIAGNOSIS — J44.9 COPD, SEVERE (HCC): Primary | ICD-10-CM

## 2018-03-22 PROCEDURE — G0424 PULMONARY REHAB W EXER: HCPCS

## 2018-03-29 ENCOUNTER — OFFICE VISIT (OUTPATIENT)
Dept: FAMILY MEDICINE CLINIC | Facility: CLINIC | Age: 73
End: 2018-03-29

## 2018-03-29 VITALS
DIASTOLIC BLOOD PRESSURE: 70 MMHG | SYSTOLIC BLOOD PRESSURE: 112 MMHG | TEMPERATURE: 98.3 F | HEART RATE: 70 BPM | HEIGHT: 62 IN | WEIGHT: 102 LBS | OXYGEN SATURATION: 98 % | BODY MASS INDEX: 18.77 KG/M2

## 2018-03-29 DIAGNOSIS — J40 BRONCHITIS: ICD-10-CM

## 2018-03-29 DIAGNOSIS — J06.9 ACUTE URI: Primary | ICD-10-CM

## 2018-03-29 PROCEDURE — 99213 OFFICE O/P EST LOW 20 MIN: CPT | Performed by: NURSE PRACTITIONER

## 2018-03-29 RX ORDER — METHYLPREDNISOLONE 4 MG/1
TABLET ORAL
Qty: 1 EACH | Refills: 0 | Status: SHIPPED | OUTPATIENT
Start: 2018-03-29 | End: 2018-12-13

## 2018-04-02 ENCOUNTER — TELEPHONE (OUTPATIENT)
Dept: GASTROENTEROLOGY | Facility: CLINIC | Age: 73
End: 2018-04-02

## 2018-04-02 NOTE — TELEPHONE ENCOUNTER
Biopsy results   Received: 6 days ago   Message Contents   MD Kirstin Hunt, DUNG; Priscila Florentino, DUNG             Okay to call results, given her history of celiac sprue, findings are consistent.  If patient is symptomatic with weight loss and diarrhea, would advise office follow-up to discuss options for further treatment.      Called pt and advised per Dr Marshall that the duodenum bx showed collagenous sprue.  The stomach bx showed some inflammation and was neg for h pylori.  The ge junction bx showed reflux pattern esophagitis and chronic inflammation.  He advised given her history of celiac sprue findings are consistent .  Advised if symptomatic with weight loss and diarrhea, would advise office f/u to discuss options further treatment. Pt verb understanding.

## 2018-04-02 NOTE — TELEPHONE ENCOUNTER
----- Message from Igor AGUILAR MD sent at 3/27/2018  8:33 AM EDT -----  Regarding: Biopsy results  Okay to call results, given her history of celiac sprue, findings are consistent.  If patient is symptomatic with weight loss and diarrhea, would advise office follow-up to discuss options for further treatment.  ----- Message -----  From: Interface, Scans Incoming  Sent: 3/26/2018   2:26 PM  To: Igor AGUILAR MD

## 2018-04-03 ENCOUNTER — TREATMENT (OUTPATIENT)
Dept: CARDIAC REHAB | Facility: HOSPITAL | Age: 73
End: 2018-04-03

## 2018-04-03 DIAGNOSIS — J44.9 COPD, SEVERE (HCC): Primary | ICD-10-CM

## 2018-04-03 PROCEDURE — G0424 PULMONARY REHAB W EXER: HCPCS

## 2018-04-05 ENCOUNTER — TREATMENT (OUTPATIENT)
Dept: CARDIAC REHAB | Facility: HOSPITAL | Age: 73
End: 2018-04-05

## 2018-04-05 DIAGNOSIS — J44.9 COPD, SEVERE (HCC): Primary | ICD-10-CM

## 2018-04-05 PROCEDURE — G0424 PULMONARY REHAB W EXER: HCPCS

## 2018-04-12 ENCOUNTER — TREATMENT (OUTPATIENT)
Dept: CARDIAC REHAB | Facility: HOSPITAL | Age: 73
End: 2018-04-12

## 2018-04-12 DIAGNOSIS — J44.9 COPD, SEVERE (HCC): Primary | ICD-10-CM

## 2018-04-12 PROCEDURE — G0424 PULMONARY REHAB W EXER: HCPCS

## 2018-04-17 ENCOUNTER — TREATMENT (OUTPATIENT)
Dept: CARDIAC REHAB | Facility: HOSPITAL | Age: 73
End: 2018-04-17

## 2018-04-17 DIAGNOSIS — J44.9 COPD, SEVERE (HCC): Primary | ICD-10-CM

## 2018-04-17 PROCEDURE — G0424 PULMONARY REHAB W EXER: HCPCS

## 2018-04-19 ENCOUNTER — TREATMENT (OUTPATIENT)
Dept: CARDIAC REHAB | Facility: HOSPITAL | Age: 73
End: 2018-04-19

## 2018-04-19 DIAGNOSIS — J44.9 COPD, SEVERE (HCC): Primary | ICD-10-CM

## 2018-04-19 PROCEDURE — G0424 PULMONARY REHAB W EXER: HCPCS

## 2018-04-19 NOTE — PROGRESS NOTES
CARDIAC/PULMONARY REHAB NUTRITION EDUCATION/ASSESSMENT      73 y.o.         Height: 62 in    Weight: 102.8 lb     BMI: 18.8 IBW: 110 lb +/- 10%               Time seen: 3:00 PM   Diet Survey Score: 62   Weight Assessment: Underweight  Weight Change:  unchanged  Usual Weight: 102-104 lb  Desired Weight: 102-104 lb     Current Diet: Gluten sensitive  Appetite: good   Factors limiting PO intake: N/A  Taste/smell changes:  No Food records reviewed? Yes     Occupation: Retired  Job Activity Level: N/A    Routine Exercise: mild   Who does the patient live with: lives alone  Who does the cooking at home: patient  Spouse/significant other present for diet instruction today? N/A  Patient actively receiving lifestyle support from others at home? No       Pertinent Lab Values:   Total: No components found for: CHOLESTEROL  HDL:   HDL Cholesterol   Date Value Ref Range Status   02/20/2018 70 >39 mg/dL Final     LDL:  LDL Cholesterol    Date Value Ref Range Status   02/20/2018 62 0 - 99 Final     Triglyceride: No components found for: TRIGLYCERIDE  Last HGBA1C with date if applicable:No components found for: A1C  Glucose:   Glucose   Date Value Ref Range Status   03/19/2016 83 65 - 99 mg/dL Final    Nutritional Supplements: multivitamin, calcium plus vitamin D3, garlic gel, iron (every other day), InstaFlex             Stated Problem Areas / Concerns: Renita states that she is gluten sensitive and tries to limit gluten foods in her diet. She states that she has fibromyalgia. She quit smoking in 11/2016 and gained about 4 pounds. Her usual weight before then was  pounds. She states that she craves sweets. She tries to limit her sodium intake.      Assessment / Recommendations: Obtained Renita's usual meal pattern and food preferences. Encouraged nutrient-dense, fiber-rich food choices. Discussed gluten-free diet guidelines. Discussed the COPD Foundation's nutrition tips and food labels.   Motivation level toward diet  compliance: moderate         Instructed on: COPD Foundation's nutrition tips, gluten-free diet, food labels  Written materials given: COPD Foundation's nutrition tips, gluten-free diet, food labels       Goals: Follow gluten-free diet guidelines and the COPD Foundation's nutrition tips             3:55 PM  4/19/2018  Tianna Corcoran RD

## 2018-04-20 RX ORDER — OMEPRAZOLE 40 MG/1
CAPSULE, DELAYED RELEASE ORAL
Qty: 90 CAPSULE | Refills: 3 | Status: SHIPPED | OUTPATIENT
Start: 2018-04-20 | End: 2018-12-13

## 2018-04-24 ENCOUNTER — TREATMENT (OUTPATIENT)
Dept: CARDIAC REHAB | Facility: HOSPITAL | Age: 73
End: 2018-04-24

## 2018-04-24 DIAGNOSIS — J44.9 COPD, SEVERE (HCC): Primary | ICD-10-CM

## 2018-04-24 PROCEDURE — G0424 PULMONARY REHAB W EXER: HCPCS

## 2018-04-26 ENCOUNTER — TREATMENT (OUTPATIENT)
Dept: CARDIAC REHAB | Facility: HOSPITAL | Age: 73
End: 2018-04-26

## 2018-04-26 DIAGNOSIS — J44.9 COPD, SEVERE (HCC): Primary | ICD-10-CM

## 2018-04-26 PROCEDURE — G0424 PULMONARY REHAB W EXER: HCPCS

## 2018-05-03 ENCOUNTER — TREATMENT (OUTPATIENT)
Dept: CARDIAC REHAB | Facility: HOSPITAL | Age: 73
End: 2018-05-03

## 2018-05-03 DIAGNOSIS — J44.9 COPD, SEVERE (HCC): Primary | ICD-10-CM

## 2018-05-03 PROCEDURE — G0424 PULMONARY REHAB W EXER: HCPCS

## 2018-05-10 ENCOUNTER — TREATMENT (OUTPATIENT)
Dept: CARDIAC REHAB | Facility: HOSPITAL | Age: 73
End: 2018-05-10

## 2018-05-10 DIAGNOSIS — J44.9 COPD, SEVERE (HCC): Primary | ICD-10-CM

## 2018-05-10 PROCEDURE — G0424 PULMONARY REHAB W EXER: HCPCS

## 2018-05-15 ENCOUNTER — TREATMENT (OUTPATIENT)
Dept: CARDIAC REHAB | Facility: HOSPITAL | Age: 73
End: 2018-05-15

## 2018-05-15 DIAGNOSIS — J44.9 COPD, SEVERE (HCC): Primary | ICD-10-CM

## 2018-05-15 PROCEDURE — G0424 PULMONARY REHAB W EXER: HCPCS

## 2018-05-17 ENCOUNTER — TREATMENT (OUTPATIENT)
Dept: CARDIAC REHAB | Facility: HOSPITAL | Age: 73
End: 2018-05-17

## 2018-05-17 DIAGNOSIS — J44.9 COPD, SEVERE (HCC): Primary | ICD-10-CM

## 2018-05-17 PROCEDURE — G0424 PULMONARY REHAB W EXER: HCPCS

## 2018-05-24 ENCOUNTER — TREATMENT (OUTPATIENT)
Dept: CARDIAC REHAB | Facility: HOSPITAL | Age: 73
End: 2018-05-24

## 2018-05-24 DIAGNOSIS — J44.9 COPD, SEVERE (HCC): Primary | ICD-10-CM

## 2018-05-24 PROCEDURE — G0424 PULMONARY REHAB W EXER: HCPCS

## 2018-05-31 ENCOUNTER — TREATMENT (OUTPATIENT)
Dept: CARDIAC REHAB | Facility: HOSPITAL | Age: 73
End: 2018-05-31

## 2018-05-31 DIAGNOSIS — J44.9 COPD, SEVERE (HCC): Primary | ICD-10-CM

## 2018-05-31 PROCEDURE — G0424 PULMONARY REHAB W EXER: HCPCS

## 2018-06-05 ENCOUNTER — TREATMENT (OUTPATIENT)
Dept: CARDIAC REHAB | Facility: HOSPITAL | Age: 73
End: 2018-06-05

## 2018-06-05 DIAGNOSIS — J44.9 COPD, SEVERE (HCC): Primary | ICD-10-CM

## 2018-06-05 PROCEDURE — G0424 PULMONARY REHAB W EXER: HCPCS

## 2018-06-07 ENCOUNTER — TREATMENT (OUTPATIENT)
Dept: CARDIAC REHAB | Facility: HOSPITAL | Age: 73
End: 2018-06-07

## 2018-06-07 DIAGNOSIS — J44.9 COPD, SEVERE (HCC): Primary | ICD-10-CM

## 2018-06-07 PROCEDURE — G0424 PULMONARY REHAB W EXER: HCPCS

## 2018-06-12 ENCOUNTER — TREATMENT (OUTPATIENT)
Dept: CARDIAC REHAB | Facility: HOSPITAL | Age: 73
End: 2018-06-12

## 2018-06-12 DIAGNOSIS — J44.9 COPD, SEVERE (HCC): Primary | ICD-10-CM

## 2018-06-12 PROCEDURE — G0424 PULMONARY REHAB W EXER: HCPCS

## 2018-06-14 ENCOUNTER — TREATMENT (OUTPATIENT)
Dept: CARDIAC REHAB | Facility: HOSPITAL | Age: 73
End: 2018-06-14

## 2018-06-14 DIAGNOSIS — J44.9 COPD, SEVERE (HCC): Primary | ICD-10-CM

## 2018-06-14 PROCEDURE — G0424 PULMONARY REHAB W EXER: HCPCS

## 2018-06-19 ENCOUNTER — TREATMENT (OUTPATIENT)
Dept: CARDIAC REHAB | Facility: HOSPITAL | Age: 73
End: 2018-06-19

## 2018-06-19 DIAGNOSIS — J44.9 COPD, SEVERE (HCC): Primary | ICD-10-CM

## 2018-06-19 PROCEDURE — G0424 PULMONARY REHAB W EXER: HCPCS

## 2018-06-26 ENCOUNTER — TREATMENT (OUTPATIENT)
Dept: CARDIAC REHAB | Facility: HOSPITAL | Age: 73
End: 2018-06-26

## 2018-06-26 DIAGNOSIS — J44.9 COPD, SEVERE (HCC): Primary | ICD-10-CM

## 2018-06-26 PROCEDURE — G0424 PULMONARY REHAB W EXER: HCPCS

## 2018-06-28 ENCOUNTER — TREATMENT (OUTPATIENT)
Dept: CARDIAC REHAB | Facility: HOSPITAL | Age: 73
End: 2018-06-28

## 2018-06-28 DIAGNOSIS — J44.9 COPD, SEVERE (HCC): Primary | ICD-10-CM

## 2018-06-28 PROCEDURE — G0424 PULMONARY REHAB W EXER: HCPCS

## 2018-08-14 ENCOUNTER — TRANSCRIBE ORDERS (OUTPATIENT)
Dept: SLEEP MEDICINE | Facility: HOSPITAL | Age: 73
End: 2018-08-14

## 2018-08-14 DIAGNOSIS — G47.33 OSA (OBSTRUCTIVE SLEEP APNEA): Primary | ICD-10-CM

## 2018-08-16 ENCOUNTER — HOSPITAL ENCOUNTER (OUTPATIENT)
Dept: SLEEP MEDICINE | Facility: HOSPITAL | Age: 73
Discharge: HOME | End: 2018-08-16
Admitting: INTERNAL MEDICINE

## 2018-08-16 DIAGNOSIS — G47.33 OSA (OBSTRUCTIVE SLEEP APNEA): ICD-10-CM

## 2018-08-16 PROCEDURE — 95810 POLYSOM 6/> YRS 4/> PARAM: CPT

## 2018-08-28 ENCOUNTER — TELEPHONE (OUTPATIENT)
Dept: FAMILY MEDICINE CLINIC | Facility: CLINIC | Age: 73
End: 2018-08-28

## 2018-08-29 DIAGNOSIS — D50.8 OTHER IRON DEFICIENCY ANEMIA: ICD-10-CM

## 2018-08-29 DIAGNOSIS — R73.09 ABNORMAL BLOOD SUGAR: Primary | ICD-10-CM

## 2018-08-29 LAB
BASOPHILS # BLD AUTO: 0.05 10*3/MM3 (ref 0–0.2)
BASOPHILS NFR BLD AUTO: 0.8 % (ref 0–1.5)
EOSINOPHIL # BLD AUTO: 1.01 10*3/MM3 (ref 0–0.7)
EOSINOPHIL NFR BLD AUTO: 16 % (ref 0.3–6.2)
ERYTHROCYTE [DISTWIDTH] IN BLOOD BY AUTOMATED COUNT: 14.6 % (ref 11.7–13)
FERRITIN SERPL-MCNC: 22.19 NG/ML (ref 13–150)
HBA1C MFR BLD: 5.86 % (ref 4.8–5.6)
HCT VFR BLD AUTO: 41.8 % (ref 35.6–45.5)
HGB BLD-MCNC: 13.3 G/DL (ref 11.9–15.5)
IMM GRANULOCYTES # BLD: 0.02 10*3/MM3 (ref 0–0.03)
IMM GRANULOCYTES NFR BLD: 0.3 % (ref 0–0.5)
IRON SATN MFR SERPL: 10 % (ref 20–50)
IRON SERPL-MCNC: 45 MCG/DL (ref 37–145)
LYMPHOCYTES # BLD AUTO: 1.76 10*3/MM3 (ref 0.9–4.8)
LYMPHOCYTES NFR BLD AUTO: 27.8 % (ref 19.6–45.3)
MCH RBC QN AUTO: 29.9 PG (ref 26.9–32)
MCHC RBC AUTO-ENTMCNC: 31.8 G/DL (ref 32.4–36.3)
MCV RBC AUTO: 93.9 FL (ref 80.5–98.2)
MONOCYTES # BLD AUTO: 0.67 10*3/MM3 (ref 0.2–1.2)
MONOCYTES NFR BLD AUTO: 10.6 % (ref 5–12)
NEUTROPHILS # BLD AUTO: 2.84 10*3/MM3 (ref 1.9–8.1)
NEUTROPHILS NFR BLD AUTO: 44.8 % (ref 42.7–76)
PLATELET # BLD AUTO: 359 10*3/MM3 (ref 140–500)
RBC # BLD AUTO: 4.45 10*6/MM3 (ref 3.9–5.2)
TIBC SERPL-MCNC: 441 MCG/DL
UIBC SERPL-MCNC: 396 MCG/DL
WBC # BLD AUTO: 6.33 10*3/MM3 (ref 4.5–10.7)

## 2018-09-14 ENCOUNTER — TELEPHONE (OUTPATIENT)
Dept: SLEEP MEDICINE | Facility: HOSPITAL | Age: 73
End: 2018-09-14

## 2018-09-24 ENCOUNTER — OFFICE VISIT (OUTPATIENT)
Dept: SLEEP MEDICINE | Facility: HOSPITAL | Age: 73
End: 2018-09-24
Attending: INTERNAL MEDICINE

## 2018-09-24 VITALS
OXYGEN SATURATION: 96 % | WEIGHT: 109.6 LBS | DIASTOLIC BLOOD PRESSURE: 61 MMHG | HEART RATE: 77 BPM | HEIGHT: 62 IN | BODY MASS INDEX: 20.17 KG/M2 | SYSTOLIC BLOOD PRESSURE: 106 MMHG

## 2018-09-24 DIAGNOSIS — G47.10 HYPERSOMNIA: Primary | ICD-10-CM

## 2018-09-24 PROCEDURE — G0463 HOSPITAL OUTPT CLINIC VISIT: HCPCS

## 2018-09-24 NOTE — PROGRESS NOTES
"[facility] PULMONARY CARE         Dr Izabel Villa  [unfilled]  Patient Care Team:  Caleb Velez APRN as PCP - General (Family Medicine)    Chief Complaint:Sleep study negative for significant ANTONI with apnea index of 0.4 events per hour                         No significant sleep-related hypoxemia noted                         Reduced sleep efficiency at 74%                         No significant periodic leg movement noted    Interval History: Patient have follow-up post sleep study.  She continues to remains more tired.  As you recall she has stage III COPD and follows me at [facility].  Currently on Symbicort and Spiriva.  Reports going to bed 11 PM gets up 8 AM.  Gets about 7 hours of sleep and feels rested.  No tobacco no alcohol abuse.    REVIEW OF SYSTEMS:   CARDIOVASCULAR: No chest pain, chest pressure or chest discomfort. No palpitations or edema.   RESPIRATORY: No shortness of breath, cough or sputum.   GASTROINTESTINAL: No anorexia, nausea, vomiting or diarrhea. No abdominal pain or blood.   HEMATOLOGIC: No bleeding or bruising.   Review of system positive for postnasal drainage shortness of breath wheezing and cough  [place] 9 out of 24 within normal limits    Ventilator/Non-Invasive Ventilation Settings     None            Vital Signs  Heart Rate:  [77] 77  BP: (106)/(61) 106/61  [unfilled]  Flowsheet Rows      First Filed Value   Admission Height  157.5 cm (62\") Documented at 09/24/2018 1203   Admission Weight  49.7 kg (109 lb 9.6 oz) Documented at 09/24/2018 1203          Physical Exam:   General Appearance:    Alert, cooperative, in no acute distress  ENT Mallampati between 2 and 3    Lungs:     Clear to auscultation,respirations regular, even and                  unlabored    Heart:    Regular rhythm and normal rate, normal S1 and S2, no            murmur, no gallop, no rub, no click   Chest Wall:    No abnormalities observed   Abdomen:     Normal bowel sounds, no masses, no organomegaly, soft        " non-tender, non-distended, no guarding, no rebound                tenderness   Extremities:   Moves all extremities well, no edema, no cyanosis, no             redness     Results Review:                                          I reviewed the patient's new clinical results.  I personally viewed and interpreted the patient's CXR        Medication Review:         No current facility-administered medications for this visit.     ASSESSMENT:   Hypersomnia with COPD and fibromyalgia    PLAN:  Reviewed sleep study result in detail with the patient  No significant ANTONI/CSA or sleep-related hypoxemia  I believe her fatigue and hypersomnia probably from fibromyalgia and COPD continue treatment of COPD with bronchodilators  Sleep hygiene measures reinforced  Patient already quit smoking  No excessive alcohol or caffeine abuse  [place] within normal limits  She will follow-up with me at [facility] as scheduled for COPD      Izabel Villa MD  09/24/18  12:22 PM

## 2018-09-28 ENCOUNTER — OFFICE VISIT (OUTPATIENT)
Dept: ORTHOPEDIC SURGERY | Facility: CLINIC | Age: 73
End: 2018-09-28

## 2018-09-28 VITALS — TEMPERATURE: 98.4 F | WEIGHT: 104 LBS | HEIGHT: 62 IN | BODY MASS INDEX: 19.14 KG/M2

## 2018-09-28 DIAGNOSIS — G89.29 CHRONIC PAIN OF RIGHT KNEE: ICD-10-CM

## 2018-09-28 DIAGNOSIS — M17.11 PRIMARY OSTEOARTHRITIS OF RIGHT KNEE: Primary | ICD-10-CM

## 2018-09-28 DIAGNOSIS — M25.561 CHRONIC PAIN OF RIGHT KNEE: ICD-10-CM

## 2018-09-28 PROCEDURE — 99213 OFFICE O/P EST LOW 20 MIN: CPT | Performed by: ORTHOPAEDIC SURGERY

## 2018-09-28 PROCEDURE — 73562 X-RAY EXAM OF KNEE 3: CPT | Performed by: ORTHOPAEDIC SURGERY

## 2018-09-28 PROCEDURE — 20610 DRAIN/INJ JOINT/BURSA W/O US: CPT | Performed by: ORTHOPAEDIC SURGERY

## 2018-09-28 RX ADMIN — LIDOCAINE HYDROCHLORIDE 2 ML: 20 INJECTION, SOLUTION EPIDURAL; INFILTRATION; INTRACAUDAL; PERINEURAL at 11:38

## 2018-09-28 RX ADMIN — METHYLPREDNISOLONE ACETATE 80 MG: 80 INJECTION, SUSPENSION INTRA-ARTICULAR; INTRALESIONAL; INTRAMUSCULAR; SOFT TISSUE at 11:38

## 2018-10-03 RX ORDER — METHYLPREDNISOLONE ACETATE 80 MG/ML
80 INJECTION, SUSPENSION INTRA-ARTICULAR; INTRALESIONAL; INTRAMUSCULAR; SOFT TISSUE
Status: COMPLETED | OUTPATIENT
Start: 2018-09-28 | End: 2018-09-28

## 2018-10-03 RX ORDER — LIDOCAINE HYDROCHLORIDE 20 MG/ML
2 INJECTION, SOLUTION EPIDURAL; INFILTRATION; INTRACAUDAL; PERINEURAL
Status: COMPLETED | OUTPATIENT
Start: 2018-09-28 | End: 2018-09-28

## 2018-10-10 ENCOUNTER — TELEPHONE (OUTPATIENT)
Dept: ORTHOPEDIC SURGERY | Facility: CLINIC | Age: 73
End: 2018-10-10

## 2018-10-16 ENCOUNTER — PREP FOR SURGERY (OUTPATIENT)
Dept: OTHER | Facility: HOSPITAL | Age: 73
End: 2018-10-16

## 2018-10-16 DIAGNOSIS — M17.10 ARTHRITIS OF KNEE: Primary | ICD-10-CM

## 2018-10-16 RX ORDER — MELOXICAM 15 MG/1
15 TABLET ORAL ONCE
Status: CANCELLED | OUTPATIENT
Start: 2018-12-27 | End: 2018-12-27

## 2018-10-16 RX ORDER — PREGABALIN 75 MG/1
150 CAPSULE ORAL ONCE
Status: CANCELLED | OUTPATIENT
Start: 2018-12-27 | End: 2018-12-27

## 2018-10-18 PROBLEM — M17.10 ARTHRITIS OF KNEE: Status: ACTIVE | Noted: 2018-10-18

## 2018-12-13 ENCOUNTER — APPOINTMENT (OUTPATIENT)
Dept: PREADMISSION TESTING | Facility: HOSPITAL | Age: 73
End: 2018-12-13

## 2018-12-13 VITALS
HEART RATE: 82 BPM | BODY MASS INDEX: 20.24 KG/M2 | TEMPERATURE: 97.5 F | SYSTOLIC BLOOD PRESSURE: 115 MMHG | WEIGHT: 110 LBS | OXYGEN SATURATION: 97 % | HEIGHT: 62 IN | RESPIRATION RATE: 20 BRPM | DIASTOLIC BLOOD PRESSURE: 76 MMHG

## 2018-12-13 DIAGNOSIS — M17.10 ARTHRITIS OF KNEE: ICD-10-CM

## 2018-12-13 LAB
ABO GROUP BLD: NORMAL
ANION GAP SERPL CALCULATED.3IONS-SCNC: 12.1 MMOL/L
BILIRUB UR QL STRIP: NEGATIVE
BLD GP AB SCN SERPL QL: NEGATIVE
BUN BLD-MCNC: 8 MG/DL (ref 8–23)
BUN/CREAT SERPL: 14.8 (ref 7–25)
CALCIUM SPEC-SCNC: 9.1 MG/DL (ref 8.6–10.5)
CHLORIDE SERPL-SCNC: 102 MMOL/L (ref 98–107)
CLARITY UR: CLEAR
CO2 SERPL-SCNC: 24.9 MMOL/L (ref 22–29)
COLOR UR: YELLOW
CREAT BLD-MCNC: 0.54 MG/DL (ref 0.57–1)
DEPRECATED RDW RBC AUTO: 48.6 FL (ref 37–54)
ERYTHROCYTE [DISTWIDTH] IN BLOOD BY AUTOMATED COUNT: 13.7 % (ref 11.7–13)
GFR SERPL CREATININE-BSD FRML MDRD: 111 ML/MIN/1.73
GLUCOSE BLD-MCNC: 109 MG/DL (ref 65–99)
GLUCOSE UR STRIP-MCNC: NEGATIVE MG/DL
HCT VFR BLD AUTO: 39.5 % (ref 35.6–45.5)
HGB BLD-MCNC: 12.5 G/DL (ref 11.9–15.5)
HGB UR QL STRIP.AUTO: NEGATIVE
KETONES UR QL STRIP: NEGATIVE
LEUKOCYTE ESTERASE UR QL STRIP.AUTO: NEGATIVE
MCH RBC QN AUTO: 30.6 PG (ref 26.9–32)
MCHC RBC AUTO-ENTMCNC: 31.6 G/DL (ref 32.4–36.3)
MCV RBC AUTO: 96.8 FL (ref 80.5–98.2)
NITRITE UR QL STRIP: NEGATIVE
PH UR STRIP.AUTO: 6.5 [PH] (ref 5–8)
PLATELET # BLD AUTO: 341 10*3/MM3 (ref 140–500)
PMV BLD AUTO: 8.9 FL (ref 6–12)
POTASSIUM BLD-SCNC: 3.7 MMOL/L (ref 3.5–5.2)
PROT UR QL STRIP: NEGATIVE
RBC # BLD AUTO: 4.08 10*6/MM3 (ref 3.9–5.2)
RH BLD: POSITIVE
SODIUM BLD-SCNC: 139 MMOL/L (ref 136–145)
SP GR UR STRIP: 1.02 (ref 1–1.03)
T&S EXPIRATION DATE: NORMAL
UROBILINOGEN UR QL STRIP: NORMAL
WBC NRBC COR # BLD: 4.95 10*3/MM3 (ref 4.5–10.7)

## 2018-12-13 PROCEDURE — 86901 BLOOD TYPING SEROLOGIC RH(D): CPT | Performed by: ORTHOPAEDIC SURGERY

## 2018-12-13 PROCEDURE — 86850 RBC ANTIBODY SCREEN: CPT | Performed by: ORTHOPAEDIC SURGERY

## 2018-12-13 PROCEDURE — 93005 ELECTROCARDIOGRAM TRACING: CPT

## 2018-12-13 PROCEDURE — 86900 BLOOD TYPING SEROLOGIC ABO: CPT | Performed by: ORTHOPAEDIC SURGERY

## 2018-12-13 PROCEDURE — 36415 COLL VENOUS BLD VENIPUNCTURE: CPT

## 2018-12-13 PROCEDURE — 85027 COMPLETE CBC AUTOMATED: CPT | Performed by: ORTHOPAEDIC SURGERY

## 2018-12-13 PROCEDURE — A9270 NON-COVERED ITEM OR SERVICE: HCPCS | Performed by: ORTHOPAEDIC SURGERY

## 2018-12-13 PROCEDURE — 93010 ELECTROCARDIOGRAM REPORT: CPT | Performed by: INTERNAL MEDICINE

## 2018-12-13 PROCEDURE — 81003 URINALYSIS AUTO W/O SCOPE: CPT | Performed by: ORTHOPAEDIC SURGERY

## 2018-12-13 PROCEDURE — 63710000001 MUPIROCIN 2 % OINTMENT: Performed by: ORTHOPAEDIC SURGERY

## 2018-12-13 PROCEDURE — 80048 BASIC METABOLIC PNL TOTAL CA: CPT | Performed by: ORTHOPAEDIC SURGERY

## 2018-12-13 RX ORDER — FLUTICASONE PROPIONATE 50 MCG
2 SPRAY, SUSPENSION (ML) NASAL AS NEEDED
COMMUNITY
End: 2021-01-28

## 2018-12-13 RX ORDER — ATORVASTATIN CALCIUM 10 MG/1
10 TABLET, FILM COATED ORAL DAILY
COMMUNITY
End: 2019-02-12 | Stop reason: SDUPTHER

## 2018-12-13 RX ORDER — DULOXETIN HYDROCHLORIDE 60 MG/1
60 CAPSULE, DELAYED RELEASE ORAL DAILY
COMMUNITY
End: 2019-02-12 | Stop reason: SDUPTHER

## 2018-12-13 RX ORDER — ALBUTEROL SULFATE 90 UG/1
2 AEROSOL, METERED RESPIRATORY (INHALATION) EVERY 4 HOURS PRN
COMMUNITY
End: 2021-01-28

## 2018-12-13 RX ORDER — BUDESONIDE AND FORMOTEROL FUMARATE DIHYDRATE 160; 4.5 UG/1; UG/1
2 AEROSOL RESPIRATORY (INHALATION)
COMMUNITY
End: 2021-01-28

## 2018-12-13 RX ORDER — ACETAMINOPHEN 500 MG
500 TABLET ORAL EVERY 6 HOURS PRN
COMMUNITY
End: 2018-12-28 | Stop reason: HOSPADM

## 2018-12-13 RX ORDER — OMEPRAZOLE 40 MG/1
40 CAPSULE, DELAYED RELEASE ORAL EVERY MORNING
COMMUNITY
End: 2019-05-01 | Stop reason: SDUPTHER

## 2018-12-13 RX ORDER — ECHINACEA PURPUREA EXTRACT 125 MG
1 TABLET ORAL AS NEEDED
COMMUNITY
End: 2020-01-27

## 2018-12-13 RX ORDER — CHLORHEXIDINE GLUCONATE 500 MG/1
1 CLOTH TOPICAL
COMMUNITY
End: 2018-12-28 | Stop reason: HOSPADM

## 2018-12-13 RX ORDER — MONTELUKAST SODIUM 10 MG/1
10 TABLET ORAL NIGHTLY
COMMUNITY
End: 2022-01-31

## 2018-12-13 ASSESSMENT — KOOS JR
KOOS JR SCORE: 42.281
KOOS JR SCORE: 18

## 2018-12-13 NOTE — DISCHARGE INSTRUCTIONS
Take the following medications the morning of surgery with a small sip of water:        General Instructions:  • Do not eat solid food after midnight the night before surgery.  • You may drink clear liquids day of surgery but must stop at least one hour before your hospital arrival time.  • It is beneficial for you to have a clear drink that contains carbohydrates the day of surgery.  We suggest a 12 to 20 ounce bottle of Gatorade or Powerade for non-diabetic patients or a 12 to 20 ounce bottle of G2 or Powerade Zero for diabetic patients. (Pediatric patients, are not advised to drink a 12 to 20 ounce carbohydrate drink)    Clear liquids are liquids you can see through.  Nothing red in color.     Plain water                               Sports drinks  Sodas                                   Gelatin (Jell-O)  Fruit juices without pulp such as white grape juice and apple juice  Popsicles that contain no fruit or yogurt  Tea or coffee (no cream or milk added)  Gatorade / Powerade  G2 / Powerade Zero    • Infants may have breast milk up to four hours before surgery.  • Infants drinking formula may drink formula up to six hours before surgery.   • Patients who avoid smoking, chewing tobacco and alcohol for 4 weeks prior to surgery have a reduced risk of post-operative complications.  Quit smoking as many days before surgery as you can.  • Do not smoke, use chewing tobacco or drink alcohol the day of surgery.   • If applicable bring your C-PAP/ BI-PAP machine.  • Bring any papers given to you in the doctor’s office.  • Wear clean comfortable clothes and socks.  • Do not wear contact lenses or make-up.  Bring a case for your glasses.   • Bring crutches or walker if applicable.  • Remove all piercings.  Leave jewelry and any other valuables at home.  • Hair extensions with metal clips must be removed prior to surgery.  • The Pre-Admission Testing nurse will instruct you to bring medications if unable to obtain an accurate  list in Pre-Admission Testing.        If you were given a blood bank ID arm band remember to bring it with you the day of surgery.    Preventing a Surgical Site Infection:  • For 2 to 3 days before surgery, avoid shaving with a razor because the razor can irritate skin and make it easier to develop an infection.    • Any areas of open skin can increase the risk of a post-operative wound infection by allowing bacteria to enter and travel throughout the body.  Notify your surgeon if you have any skin wounds / rashes even if it is not near the expected surgical site.  The area will need assessed to determine if surgery should be delayed until it is healed.  • The night prior to surgery sleep in a clean bed with clean clothing.  Do not allow pets to sleep with you.  • Shower on the morning of surgery using a fresh bar of anti-bacterial soap (such as Dial) and clean washcloth.  Dry with a clean towel and dress in clean clothing.  • Ask your surgeon if you will be receiving antibiotics prior to surgery.  • Make sure you, your family, and all healthcare providers clean their hands with soap and water or an alcohol based hand  before caring for you or your wound.    Day of surgery:12/27/18   0600  Upon arrival, a Pre-op nurse and Anesthesiologist will review your health history, obtain vital signs, and answer questions you may have.  The only belongings needed at this time will be your home medications and if applicable your C-PAP/BI-PAP machine.  If you are staying overnight your family can leave the rest of your belongings in the car and bring them to your room later.  A Pre-op nurse will start an IV and you may receive medication in preparation for surgery, including something to help you relax.  Your family will be able to see you in the Pre-op area.  While you are in surgery your family should notify the waiting room  if they leave the waiting room area and provide a contact phone number.    Please  be aware that surgery does come with discomfort.  We want to make every effort to control your discomfort so please discuss any uncontrolled symptoms with your nurse.   Your doctor will most likely have prescribed pain medications.      If you are going home after surgery you will receive individualized written care instructions before being discharged.  A responsible adult must drive you to and from the hospital on the day of your surgery and stay with you for 24 hours.    If you are staying overnight following surgery, you will be transported to your hospital room following the recovery period.  [facility] has all private rooms.    You have received a list of surgical assistants for your reference.  If you have any questions please call Pre-Admission Testing at [phone].  Deductibles and co-payments are collected on the day of service. Please be prepared to pay the required co-pay, deductible or deposit on the day of service as defined by your plan.    2% CHLORAHEXIDINE GLUCONATE* CLOTH  Preparing or “prepping” skin before surgery can reduce the risk of infection at the surgical site. To make the process easier, [facility] has chosen disposable cloths moistened with a rinse-free, 2% Chlorhexidine Gluconate (CHG) antiseptic solution. The steps below outline the prepping process and should be carefully followed.        Use the prep cloth on the area that is circled in the diagram             Directions Night before Surgery  1) Shower using a fresh bar of anti-bacterial soap (such as Dial) and clean washcloth.  Use a clean towel to completely dry your skin.  2) Do not use any lotions, oils or creams on your skin.  3) Open the package and remove 1 cloth, wipe your skin for 30 seconds in a circular motion.  Allow to dry for 3 minutes.  4) Repeat #3 with second cloth.  5) Do not touch your eyes, ears, or mouth with the prep cloth.  6) Allow the wet prep solution to air dry.  7) Discard the  prep cloth and wash your hands with soap and water.   8) Dress in clean bed clothes and sleep on fresh clean bed sheets.   9) You may experience some temporary itching after the prep.    Directions Day of Surgery  1) Repeat steps 1,2,3,4,5,6,7, and 9.   2) Dress in clean clothes before coming to the hospital.    BACTROBAN NASAL OINTMENT  There are many germs normally in your nose. Bactroban is an ointment that will help reduce these germs. Please follow these instructions for Bactroban use:      __1__The day before surgery in the morning  Date_12/26/18_______    _2___The day before surgery in the evening              Date_12/26/18_______    _3___The day of surgery in the morning    Date_12/27/18     **Squirt ½ package of Bactroban Ointment onto a cotton applicator and apply to inside of 1st nostril.  Squirt the remaining Bactroban and apply to the inside of the other nostril.    PERIDEX- ORAL:  Use only if your surgeon has ordered  Use the night before and morning of surgery - Swish, gargle, and spit - do not swallow.

## 2018-12-19 NOTE — PROGRESS NOTES
Pre-Operative Orthopedic Assessment      Patient: Елена Beach    YOB: 1945      Age/Gender: 73 y.o. female  Medical Record Number: [id number]  Surgical Procedure Planned: TOTAL KNEE ARTHROPLASTY     Surgeon: Gerry Estevez MD    Date of Surgery Planned: 12/27/2018    Question Value Score    Patient Score   1. What is your age group? 50-65 years  66-75 years  >75 years =2  =1  =0 1   2. Gender? Male  Female =2  =1 1   3. How far on average can you walk? (a block is 200 meters) Two blocks or more (+\- rest)  1-2 blocks (+\- rest)  Housebound (most of time) =2  =1  =0 2   4. Which gait aid to you use? (more often than not) None  Single-Point Stick  Crutches/Frame =2  =1  =0 2   5. Do you use community  supports? (home help, meals on wheels, district nursing) None or one per week  Two or more per week =1  =0 1   6. Will you live with someone who can care for you after your operation? Yes  No =3  =0 3      Your Score (out of 12)  10     Key Destination at Discharge from acute care predicted by score   Scores < 6  -- extended inpatient rehabilitation   Scores 6-9 -- additional intervention to discharge directly home (Rehabilitation in home)   Scores > 9 -- directly home         Discharge Disposition/Planning:     Patient Response   Discussed the Predicted discharge disposition needed based on RAPT Assessment with the patient.    yes   Patient selected discharge disposition:   Home with home health   Out Patient Rehabilitation Facility of Choice:    [facility]   Home Health Services Preferred:   [facility]   Post-Operative Care Giver Name:    Friend Ned Flores [phone] and friend Camille Conte, [phone]     Subacute Inpatient Rehabilitation:  Complete this section only if planning inpatient services at a Subacute Facility     Patient Response   Subacute Facility Preferred (Please list 2 facilities:      Requires pre-certification for inpatient rehabilitation  services?         Planned source of transportation to inpatient rehabilitation facility?       If choosing inpatient services at an Acute or Subacute Facility please list a subsequent back-up plan (in case patient fails to qualify for inpatient rehabilitation). Back-up plans should include caregiver (family member or friend) for first 24-48 post- -operatively.       Home Equipment Patient Response   Does patient have a walker for home use?    yes   Does patient have a 3 in 1 commode for home use?    no   Does patient have a shower chair for home use?    yes   Does patient have an elevated commode seat for home use? yes   Does patient have a cane for home use?    yes   Is there any other medical equipment in the home? If so,  List in comment section below no   Pre-Operative Class Attendance Patient Response   Attended or scheduled to attend the pre-operative class within 1 year of total joint replacement? Yes, 12/18   Patient Education  Completed   Expected time of discharge discussed yes   Encouraged to attend Pre-Operative Class    n/a   Education re: Predicted Discharge Disposition based on RAPT score    yes   Patient receptive and voiced understanding of information given    yes                                                                                                            Comments:    Address verified.  Pt will discharge to her boyfriend Ned Flores home at [address] [place] [id number].  This is where she will be for the first 4-5 days.  She has no stairs.                                         Signature: Kirstin Osorio RN    Date:  12/19/2018

## 2018-12-20 ENCOUNTER — TRANSCRIBE ORDERS (OUTPATIENT)
Dept: ADMINISTRATIVE | Facility: HOSPITAL | Age: 73
End: 2018-12-20

## 2018-12-20 DIAGNOSIS — Z12.39 SCREENING BREAST EXAMINATION: Primary | ICD-10-CM

## 2018-12-21 ENCOUNTER — OFFICE VISIT (OUTPATIENT)
Dept: ORTHOPEDIC SURGERY | Facility: CLINIC | Age: 73
End: 2018-12-21

## 2018-12-21 VITALS — WEIGHT: 110 LBS | BODY MASS INDEX: 20.24 KG/M2 | HEIGHT: 62 IN | TEMPERATURE: 97.5 F

## 2018-12-21 DIAGNOSIS — Z01.818 PRE-OP EVALUATION: ICD-10-CM

## 2018-12-21 DIAGNOSIS — M17.11 PRIMARY OSTEOARTHRITIS OF RIGHT KNEE: Primary | ICD-10-CM

## 2018-12-21 PROCEDURE — S0260 H&P FOR SURGERY: HCPCS | Performed by: ORTHOPAEDIC SURGERY

## 2018-12-21 PROCEDURE — 77077 JOINT SURVEY SINGLE VIEW: CPT | Performed by: ORTHOPAEDIC SURGERY

## 2018-12-21 NOTE — H&P (VIEW-ONLY)
"   History & Physical       Patient: Елена Beach    YOB: 1945    Medical Record Number: [id number]    Chief Complaints: Right knee endstage osteoarthritis    History of Present Illness: 73 y.o. female presents today in anticipation of upcoming knee replacement surgery.  Patient has a long history of worsening symptoms.  Describes the pain as severe, constant, and typically dull and achy.  Patient has tried and failed prolonged conservative treatment.  Patient is struggling with routine daily activities and this has become a significant issue for overall quality of life.  Patient cannot walk any prolonged distances without having to rest.  Patient presents today in anticipation of a total knee arthroplasty     Allergies:   Allergies   Allergen Reactions   • Codeine Other (See Comments)     Keeps awake   • Ibuprofen Other (See Comments)     Can't take due to hiatal hernia   • Keflex [Cephalexin] Other (See Comments)     Makes feel \"goofy\"   • Pseudoephedrine Other (See Comments)     Heart palpitations       Medications:   Home Medications:    Current Outpatient Medications:   •  acetaminophen (TYLENOL) 500 MG tablet, Take 500 mg by mouth Every 6 (Six) Hours As Needed for Mild Pain ., Disp: , Rfl:   •  albuterol 108 (90 Base) MCG/ACT inhaler, Inhale 2 puffs Every 4 (Four) Hours As Needed for Wheezing., Disp: , Rfl:   •  atorvastatin (LIPITOR) 10 MG tablet, Take 10 mg by mouth Daily., Disp: , Rfl:   •  budesonide-formoterol (SYMBICORT) 160-4.5 MCG/ACT inhaler, Inhale 2 puffs 2 (Two) Times a Day., Disp: , Rfl:   •  DULoxetine (CYMBALTA) 60 MG capsule, Take 60 mg by mouth Daily., Disp: , Rfl:   •  montelukast (SINGULAIR) 10 MG tablet, Take 10 mg by mouth Every Night., Disp: , Rfl:   •  omeprazole (priLOSEC) 40 MG capsule, Take 40 mg by mouth Every Morning., Disp: , Rfl:   •  tiotropium (SPIRIVA) 18 MCG per inhalation capsule, Place 1 capsule into inhaler and inhale Daily., Disp: , Rfl:   •  Calcium " Citrate-Vitamin D (CALCIUM CITRATE + D PO), Take 1 tablet by mouth Daily., Disp: , Rfl:   •  Chlorhexidine Gluconate Cloth 2 % pads, Apply 1 application topically. USE AS DIRECTED PREOP, Disp: , Rfl:   •  Ferrous Gluconate (IRON 27 PO), Take 1 tablet by mouth Every Other Day., Disp: , Rfl:   •  fluticasone (FLONASE) 50 MCG/ACT nasal spray, 2 sprays into the nostril(s) as directed by provider Daily., Disp: , Rfl:   •  GuaiFENesin (MUCINEX PO), Take 600 mg by mouth 2 (Two) Times a Day., Disp: , Rfl:   •  Multiple Vitamins-Minerals (MULTIVITAMIN ADULT PO), Take 1 tablet by mouth Daily., Disp: , Rfl:   •  mupirocin (BACTROBAN) 2 % nasal ointment, 1 application into the nostril(s) as directed by provider. USE AS DIRECTED PREOP, Disp: , Rfl:   •  NON FORMULARY, 1 capsule 2 (Two) Times a Day. instaflex, Disp: , Rfl:   •  sodium chloride (OCEAN) 0.65 % nasal spray, 1 spray into the nostril(s) as directed by provider As Needed for Congestion., Disp: , Rfl:     Past Medical History:   Diagnosis Date   • Anemia    • Arthritis    • Asthma    • Back pain    • Bursitis 1995   • COPD (chronic obstructive pulmonary disease) (CMS/[facility])    • Fibromyalgia    • Fibromyositis 1995   • GERD (gastroesophageal reflux disease)    • Hiatal hernia    • History of pneumonia    • Hyperlipidemia    • Hypertension    • Knee pain    • Osteoarthritis 2008   • Osteoporosis    • Scoliosis 1995          Past Surgical History:   Procedure Laterality Date   • COLONOSCOPY     • ENDOSCOPY     • FOOT SURGERY Right 2006   • HAND SURGERY  2004   • SHOULDER ARTHROSCOPY  2014   • SHOULDER SURGERY Left    • TOE SURGERY Right    • TOOTH EXTRACTION     • TUBAL ABDOMINAL LIGATION     • WRIST SURGERY            Social History     Occupational History   • Occupation: [employer]     Employer: RETIRED   Tobacco Use   • Smoking status: Former Smoker     Packs/day: 1.00     Years: 40.00     Pack years: 40.00     Types: Cigarettes     Last attempt to quit: 11/1/2016      "Years since quittin.1   • Smokeless tobacco: Never Used   Substance and Sexual Activity   • Alcohol use: Yes     Alcohol/week: 0.6 oz     Types: 1 Shots of liquor per week   • Drug use: No   • Sexual activity: Not Currently     Partners: Male     Birth control/protection: Other      Social History     Social History Narrative   • Not on file          Family History   Problem Relation Age of Onset   • Heart failure Mother    • Hypertension Mother    • Cancer Mother         breast   • Cancer Father         prostate   • Malig Hyperthermia Neg Hx        Review of Systems:  A 14 point review of systems is reviewed with the patient.  Pertinent positives are listed above.  All others are negative.    Physical Exam: 73 y.o. female    Vitals:    18 1140   Temp: 97.5 °F (36.4 °C)   TempSrc: Temporal   Weight: 49.9 kg (110 lb)   Height: 157.5 cm (62\")       General:  Patient is awake and alert.  Appears in no acute distress or discomfort.    Psych:  Affect and demeanor are appropriate.    Eyes:  Conjunctiva and sclera appear grossly normal.  Eyes track well and EOM seem to be intact.    Ears:  No gross abnormalities.  Hearing adequate for the exam.    Cardiovascular:  Regular rate and rhythm.    Lungs:  Good chest expansion.  Breathing unlabored.    Lymph:  No palpable adenopathy about neck or axilla.    Right lower extremity:  Skin benign and intact without evidence for swelling, masses or atrophy.  No palpable masses. Focal tenderness noted over lateral joint line.  ROM is actually pretty good.  She has near full extension and flexion to about 115°.  Knee is stable on exam.  Good strength throughout the lower leg and foot.  Intact sensation throughout.  Palpable pedal pulses with brisk cap refill.    Diagnostic Tests:  Lab Results   Component Value Date    GLUCOSE 109 (H) 2018    CALCIUM 9.1 2018     2018    K 3.7 2018    CO2 24.9 2018     2018    BUN 8 2018    " CREATININE 0.54 (L) 12/13/2018    EGFRIFAFRI 107 02/20/2018    EGFRIFNONA 111 12/13/2018    BCR 14.8 12/13/2018    ANIONGAP 12.1 12/13/2018     Lab Results   Component Value Date    WBC 4.95 12/13/2018    HGB 12.5 12/13/2018    HCT 39.5 12/13/2018    MCV 96.8 12/13/2018     12/13/2018     No results found for: INR, PROTIME    Imaging:  Previous x-rays of the knee are reviewed.  The studies show advanced medial compartment osteoarthritis.  A full-length alignment film is ordered and reviewed today for presurgical planning purposes.  The film shows varus alignment with the weightbearing axis passing through the medial edge of the medial compartment    Assessment:  Right knee endstage osteoarthritis    Plan: We will plan on proceeding with a right total knee arthroplasty at the patient's request.  I reviewed details of procedure with patient today and discussed all the risks, benefits, alternatives, and limitations of the procedure in laymen's terms with the risks including but not limited to:  neurovascular damage resulting in permanent dysfunction or footdrop and potential need for further surgery, bleeding, infection, hematoma, chronic pain, worsening of pain, persistent symptoms potentially necessitating revision, prosthesis related problems including loosening or allergy, swelling, loss of motion and arthrofibrosis, weakness, stiffness, instability, DVT, pulmonary embolus, death, stroke, complex regional pain syndrome, and need for additional procedures.  Patient verbalized understanding, and was given the opportunity to ask and have all questions answered today.  No guarantees were given regarding results of surgery.      Date: 12/21/2018    Gerry Estevez MD

## 2018-12-21 NOTE — PROGRESS NOTES
"   History & Physical       Patient: Елена Beach    YOB: 1945    Medical Record Number: [id number]    Chief Complaints: Right knee endstage osteoarthritis    History of Present Illness: 73 y.o. female presents today in anticipation of upcoming knee replacement surgery.  Patient has a long history of worsening symptoms.  Describes the pain as severe, constant, and typically dull and achy.  Patient has tried and failed prolonged conservative treatment.  Patient is struggling with routine daily activities and this has become a significant issue for overall quality of life.  Patient cannot walk any prolonged distances without having to rest.  Patient presents today in anticipation of a total knee arthroplasty     Allergies:   Allergies   Allergen Reactions   • Codeine Other (See Comments)     Keeps awake   • Ibuprofen Other (See Comments)     Can't take due to hiatal hernia   • Keflex [Cephalexin] Other (See Comments)     Makes feel \"goofy\"   • Pseudoephedrine Other (See Comments)     Heart palpitations       Medications:   Home Medications:    Current Outpatient Medications:   •  acetaminophen (TYLENOL) 500 MG tablet, Take 500 mg by mouth Every 6 (Six) Hours As Needed for Mild Pain ., Disp: , Rfl:   •  albuterol 108 (90 Base) MCG/ACT inhaler, Inhale 2 puffs Every 4 (Four) Hours As Needed for Wheezing., Disp: , Rfl:   •  atorvastatin (LIPITOR) 10 MG tablet, Take 10 mg by mouth Daily., Disp: , Rfl:   •  budesonide-formoterol (SYMBICORT) 160-4.5 MCG/ACT inhaler, Inhale 2 puffs 2 (Two) Times a Day., Disp: , Rfl:   •  DULoxetine (CYMBALTA) 60 MG capsule, Take 60 mg by mouth Daily., Disp: , Rfl:   •  montelukast (SINGULAIR) 10 MG tablet, Take 10 mg by mouth Every Night., Disp: , Rfl:   •  omeprazole (priLOSEC) 40 MG capsule, Take 40 mg by mouth Every Morning., Disp: , Rfl:   •  tiotropium (SPIRIVA) 18 MCG per inhalation capsule, Place 1 capsule into inhaler and inhale Daily., Disp: , Rfl:   •  Calcium " Citrate-Vitamin D (CALCIUM CITRATE + D PO), Take 1 tablet by mouth Daily., Disp: , Rfl:   •  Chlorhexidine Gluconate Cloth 2 % pads, Apply 1 application topically. USE AS DIRECTED PREOP, Disp: , Rfl:   •  Ferrous Gluconate (IRON 27 PO), Take 1 tablet by mouth Every Other Day., Disp: , Rfl:   •  fluticasone (FLONASE) 50 MCG/ACT nasal spray, 2 sprays into the nostril(s) as directed by provider Daily., Disp: , Rfl:   •  GuaiFENesin (MUCINEX PO), Take 600 mg by mouth 2 (Two) Times a Day., Disp: , Rfl:   •  Multiple Vitamins-Minerals (MULTIVITAMIN ADULT PO), Take 1 tablet by mouth Daily., Disp: , Rfl:   •  mupirocin (BACTROBAN) 2 % nasal ointment, 1 application into the nostril(s) as directed by provider. USE AS DIRECTED PREOP, Disp: , Rfl:   •  NON FORMULARY, 1 capsule 2 (Two) Times a Day. instaflex, Disp: , Rfl:   •  sodium chloride (OCEAN) 0.65 % nasal spray, 1 spray into the nostril(s) as directed by provider As Needed for Congestion., Disp: , Rfl:     Past Medical History:   Diagnosis Date   • Anemia    • Arthritis    • Asthma    • Back pain    • Bursitis 1995   • COPD (chronic obstructive pulmonary disease) (CMS/[facility])    • Fibromyalgia    • Fibromyositis 1995   • GERD (gastroesophageal reflux disease)    • Hiatal hernia    • History of pneumonia    • Hyperlipidemia    • Hypertension    • Knee pain    • Osteoarthritis 2008   • Osteoporosis    • Scoliosis 1995          Past Surgical History:   Procedure Laterality Date   • COLONOSCOPY     • ENDOSCOPY     • FOOT SURGERY Right 2006   • HAND SURGERY  2004   • SHOULDER ARTHROSCOPY  2014   • SHOULDER SURGERY Left    • TOE SURGERY Right    • TOOTH EXTRACTION     • TUBAL ABDOMINAL LIGATION     • WRIST SURGERY            Social History     Occupational History   • Occupation: [employer]     Employer: RETIRED   Tobacco Use   • Smoking status: Former Smoker     Packs/day: 1.00     Years: 40.00     Pack years: 40.00     Types: Cigarettes     Last attempt to quit: 11/1/2016      "Years since quittin.1   • Smokeless tobacco: Never Used   Substance and Sexual Activity   • Alcohol use: Yes     Alcohol/week: 0.6 oz     Types: 1 Shots of liquor per week   • Drug use: No   • Sexual activity: Not Currently     Partners: Male     Birth control/protection: Other      Social History     Social History Narrative   • Not on file          Family History   Problem Relation Age of Onset   • Heart failure Mother    • Hypertension Mother    • Cancer Mother         breast   • Cancer Father         prostate   • Malig Hyperthermia Neg Hx        Review of Systems:  A 14 point review of systems is reviewed with the patient.  Pertinent positives are listed above.  All others are negative.    Physical Exam: 73 y.o. female    Vitals:    18 1140   Temp: 97.5 °F (36.4 °C)   TempSrc: Temporal   Weight: 49.9 kg (110 lb)   Height: 157.5 cm (62\")       General:  Patient is awake and alert.  Appears in no acute distress or discomfort.    Psych:  Affect and demeanor are appropriate.    Eyes:  Conjunctiva and sclera appear grossly normal.  Eyes track well and EOM seem to be intact.    Ears:  No gross abnormalities.  Hearing adequate for the exam.    Cardiovascular:  Regular rate and rhythm.    Lungs:  Good chest expansion.  Breathing unlabored.    Lymph:  No palpable adenopathy about neck or axilla.    Right lower extremity:  Skin benign and intact without evidence for swelling, masses or atrophy.  No palpable masses. Focal tenderness noted over lateral joint line.  ROM is actually pretty good.  She has near full extension and flexion to about 115°.  Knee is stable on exam.  Good strength throughout the lower leg and foot.  Intact sensation throughout.  Palpable pedal pulses with brisk cap refill.    Diagnostic Tests:  Lab Results   Component Value Date    GLUCOSE 109 (H) 2018    CALCIUM 9.1 2018     2018    K 3.7 2018    CO2 24.9 2018     2018    BUN 8 2018    " CREATININE 0.54 (L) 12/13/2018    EGFRIFAFRI 107 02/20/2018    EGFRIFNONA 111 12/13/2018    BCR 14.8 12/13/2018    ANIONGAP 12.1 12/13/2018     Lab Results   Component Value Date    WBC 4.95 12/13/2018    HGB 12.5 12/13/2018    HCT 39.5 12/13/2018    MCV 96.8 12/13/2018     12/13/2018     No results found for: INR, PROTIME    Imaging:  Previous x-rays of the knee are reviewed.  The studies show advanced medial compartment osteoarthritis.  A full-length alignment film is ordered and reviewed today for presurgical planning purposes.  The film shows varus alignment with the weightbearing axis passing through the medial edge of the medial compartment    Assessment:  Right knee endstage osteoarthritis    Plan: We will plan on proceeding with a right total knee arthroplasty at the patient's request.  I reviewed details of procedure with patient today and discussed all the risks, benefits, alternatives, and limitations of the procedure in laymen's terms with the risks including but not limited to:  neurovascular damage resulting in permanent dysfunction or footdrop and potential need for further surgery, bleeding, infection, hematoma, chronic pain, worsening of pain, persistent symptoms potentially necessitating revision, prosthesis related problems including loosening or allergy, swelling, loss of motion and arthrofibrosis, weakness, stiffness, instability, DVT, pulmonary embolus, death, stroke, complex regional pain syndrome, and need for additional procedures.  Patient verbalized understanding, and was given the opportunity to ask and have all questions answered today.  No guarantees were given regarding results of surgery.      Date: 12/21/2018    Gerry Estevez MD

## 2018-12-27 ENCOUNTER — ANESTHESIA EVENT (OUTPATIENT)
Dept: PERIOP | Facility: HOSPITAL | Age: 73
End: 2018-12-27

## 2018-12-27 ENCOUNTER — APPOINTMENT (OUTPATIENT)
Dept: GENERAL RADIOLOGY | Facility: HOSPITAL | Age: 73
End: 2018-12-27

## 2018-12-27 ENCOUNTER — HOSPITAL ENCOUNTER (OUTPATIENT)
Facility: HOSPITAL | Age: 73
Discharge: HOME | End: 2018-12-28
Attending: ORTHOPAEDIC SURGERY | Admitting: ORTHOPAEDIC SURGERY

## 2018-12-27 ENCOUNTER — ANESTHESIA (OUTPATIENT)
Dept: PERIOP | Facility: HOSPITAL | Age: 73
End: 2018-12-27

## 2018-12-27 DIAGNOSIS — Z96.651 STATUS POST TOTAL RIGHT KNEE REPLACEMENT: Primary | ICD-10-CM

## 2018-12-27 DIAGNOSIS — M17.10 ARTHRITIS OF KNEE: ICD-10-CM

## 2018-12-27 PROBLEM — M17.9 OSTEOARTHRITIS OF KNEE, UNSPECIFIED: Status: ACTIVE | Noted: 2018-12-27

## 2018-12-27 LAB
ABO GROUP BLD: NORMAL
BLD GP AB SCN SERPL QL: NEGATIVE
RH BLD: POSITIVE
T&S EXPIRATION DATE: NORMAL

## 2018-12-27 PROCEDURE — 25010000002 MIDAZOLAM PER 1 MG: Performed by: ANESTHESIOLOGY

## 2018-12-27 PROCEDURE — 25010000002 ROPIVACAINE PER 1 MG: Performed by: ANESTHESIOLOGY

## 2018-12-27 PROCEDURE — A9270 NON-COVERED ITEM OR SERVICE: HCPCS | Performed by: ORTHOPAEDIC SURGERY

## 2018-12-27 PROCEDURE — 63710000001 MELOXICAM 15 MG TABLET: Performed by: ORTHOPAEDIC SURGERY

## 2018-12-27 PROCEDURE — 63710000001 MUPIROCIN 2 % OINTMENT: Performed by: ORTHOPAEDIC SURGERY

## 2018-12-27 PROCEDURE — 63710000001 BUDESONIDE-FORMOTEROL 160-4.5 MCG/ACT AEROSOL 6 G INHALER: Performed by: ORTHOPAEDIC SURGERY

## 2018-12-27 PROCEDURE — 27447 TOTAL KNEE ARTHROPLASTY: CPT | Performed by: ORTHOPAEDIC SURGERY

## 2018-12-27 PROCEDURE — 94640 AIRWAY INHALATION TREATMENT: CPT

## 2018-12-27 PROCEDURE — 25010000002 FENTANYL CITRATE (PF) 100 MCG/2ML SOLUTION: Performed by: ANESTHESIOLOGY

## 2018-12-27 PROCEDURE — 94799 UNLISTED PULMONARY SVC/PX: CPT

## 2018-12-27 PROCEDURE — 86850 RBC ANTIBODY SCREEN: CPT | Performed by: ORTHOPAEDIC SURGERY

## 2018-12-27 PROCEDURE — 63710000001 OXYCODONE-ACETAMINOPHEN 7.5-325 MG TABLET: Performed by: ORTHOPAEDIC SURGERY

## 2018-12-27 PROCEDURE — 97110 THERAPEUTIC EXERCISES: CPT | Performed by: PHYSICAL THERAPIST

## 2018-12-27 PROCEDURE — G0378 HOSPITAL OBSERVATION PER HR: HCPCS

## 2018-12-27 PROCEDURE — 25010000002 FENTANYL CITRATE (PF) 100 MCG/2ML SOLUTION: Performed by: NURSE ANESTHETIST, CERTIFIED REGISTERED

## 2018-12-27 PROCEDURE — 86901 BLOOD TYPING SEROLOGIC RH(D): CPT | Performed by: ORTHOPAEDIC SURGERY

## 2018-12-27 PROCEDURE — 25010000002 DEXAMETHASONE PER 1 MG: Performed by: NURSE ANESTHETIST, CERTIFIED REGISTERED

## 2018-12-27 PROCEDURE — 73560 X-RAY EXAM OF KNEE 1 OR 2: CPT

## 2018-12-27 PROCEDURE — 25010000002 PHENYLEPHRINE PER 1 ML: Performed by: NURSE ANESTHETIST, CERTIFIED REGISTERED

## 2018-12-27 PROCEDURE — 63710000001 PREGABALIN 75 MG CAPSULE: Performed by: ORTHOPAEDIC SURGERY

## 2018-12-27 PROCEDURE — 86900 BLOOD TYPING SEROLOGIC ABO: CPT | Performed by: ORTHOPAEDIC SURGERY

## 2018-12-27 PROCEDURE — C1713 ANCHOR/SCREW BN/BN,TIS/BN: HCPCS | Performed by: ORTHOPAEDIC SURGERY

## 2018-12-27 PROCEDURE — C1776 JOINT DEVICE (IMPLANTABLE): HCPCS | Performed by: ORTHOPAEDIC SURGERY

## 2018-12-27 PROCEDURE — G8979 MOBILITY GOAL STATUS: HCPCS

## 2018-12-27 PROCEDURE — G8978 MOBILITY CURRENT STATUS: HCPCS

## 2018-12-27 PROCEDURE — 63710000001 ATORVASTATIN 10 MG TABLET: Performed by: ORTHOPAEDIC SURGERY

## 2018-12-27 PROCEDURE — 63710000001 DOCUSATE SODIUM 100 MG CAPSULE: Performed by: ORTHOPAEDIC SURGERY

## 2018-12-27 PROCEDURE — 25010000002 ONDANSETRON PER 1 MG: Performed by: NURSE ANESTHETIST, CERTIFIED REGISTERED

## 2018-12-27 PROCEDURE — 25010000002 MEPIVACAINE PF 2 % SOLUTION 20 ML VIAL: Performed by: ANESTHESIOLOGY

## 2018-12-27 PROCEDURE — 63710000001 PANTOPRAZOLE 40 MG TABLET DELAYED-RELEASE: Performed by: ORTHOPAEDIC SURGERY

## 2018-12-27 PROCEDURE — 25010000002 VANCOMYCIN 750 MG RECONSTITUTED SOLUTION: Performed by: ORTHOPAEDIC SURGERY

## 2018-12-27 PROCEDURE — C9290 INJ, BUPIVACAINE LIPOSOME: HCPCS | Performed by: ORTHOPAEDIC SURGERY

## 2018-12-27 PROCEDURE — 97162 PT EVAL MOD COMPLEX 30 MIN: CPT | Performed by: PHYSICAL THERAPIST

## 2018-12-27 PROCEDURE — 63710000001 DULOXETINE 60 MG CAPSULE DELAYED-RELEASE PARTICLES: Performed by: ORTHOPAEDIC SURGERY

## 2018-12-27 PROCEDURE — 25010000003 BUPIVACAINE LIPOSOME 1.3 % SUSPENSION 20 ML VIAL: Performed by: ORTHOPAEDIC SURGERY

## 2018-12-27 PROCEDURE — 25010000002 PROPOFOL 10 MG/ML EMULSION: Performed by: NURSE ANESTHETIST, CERTIFIED REGISTERED

## 2018-12-27 DEVICE — GENESIS II DISHED ARTICULAR INSERT                                    SIZE 1-2 11MM
Type: IMPLANTABLE DEVICE | Site: KNEE | Status: FUNCTIONAL
Brand: GENESIS II

## 2018-12-27 DEVICE — LEGION CRUCIATE RETAINING OXINIUM                                    FEMORAL SIZE 3 RIGHT
Type: IMPLANTABLE DEVICE | Site: KNEE | Status: FUNCTIONAL
Brand: LEGION

## 2018-12-27 DEVICE — GENESIS II NON-POROUS TIBIAL                                    BASEPLATE SIZE 2 RIGHT
Type: IMPLANTABLE DEVICE | Site: KNEE | Status: FUNCTIONAL
Brand: GENESIS II

## 2018-12-27 DEVICE — IMPLANTABLE DEVICE: Type: IMPLANTABLE DEVICE | Site: KNEE | Status: FUNCTIONAL

## 2018-12-27 DEVICE — CMT BONE PALACOS R HI/VISC 1X40: Type: IMPLANTABLE DEVICE | Site: KNEE | Status: FUNCTIONAL

## 2018-12-27 DEVICE — GEN II 7.5MM RESUR PAT 29MM
Type: IMPLANTABLE DEVICE | Site: KNEE | Status: FUNCTIONAL
Brand: GENESIS II

## 2018-12-27 RX ORDER — MIDAZOLAM HYDROCHLORIDE 1 MG/ML
2 INJECTION INTRAMUSCULAR; INTRAVENOUS
Status: DISCONTINUED | OUTPATIENT
Start: 2018-12-27 | End: 2018-12-27 | Stop reason: HOSPADM

## 2018-12-27 RX ORDER — EPHEDRINE SULFATE 50 MG/ML
5 INJECTION, SOLUTION INTRAVENOUS ONCE AS NEEDED
Status: DISCONTINUED | OUTPATIENT
Start: 2018-12-27 | End: 2018-12-27 | Stop reason: HOSPADM

## 2018-12-27 RX ORDER — HYDROMORPHONE HYDROCHLORIDE 1 MG/ML
0.5 INJECTION, SOLUTION INTRAMUSCULAR; INTRAVENOUS; SUBCUTANEOUS
Status: DISCONTINUED | OUTPATIENT
Start: 2018-12-27 | End: 2018-12-28 | Stop reason: HOSPADM

## 2018-12-27 RX ORDER — ONDANSETRON 2 MG/ML
4 INJECTION INTRAMUSCULAR; INTRAVENOUS EVERY 6 HOURS PRN
Status: DISCONTINUED | OUTPATIENT
Start: 2018-12-27 | End: 2018-12-28 | Stop reason: HOSPADM

## 2018-12-27 RX ORDER — PANTOPRAZOLE SODIUM 40 MG/1
40 TABLET, DELAYED RELEASE ORAL EVERY MORNING
Status: DISCONTINUED | OUTPATIENT
Start: 2018-12-27 | End: 2018-12-28 | Stop reason: HOSPADM

## 2018-12-27 RX ORDER — DIPHENHYDRAMINE HCL 25 MG
25 CAPSULE ORAL
Status: DISCONTINUED | OUTPATIENT
Start: 2018-12-27 | End: 2018-12-27 | Stop reason: HOSPADM

## 2018-12-27 RX ORDER — MONTELUKAST SODIUM 10 MG/1
10 TABLET ORAL NIGHTLY
Status: DISCONTINUED | OUTPATIENT
Start: 2018-12-27 | End: 2018-12-28 | Stop reason: HOSPADM

## 2018-12-27 RX ORDER — DULOXETIN HYDROCHLORIDE 60 MG/1
60 CAPSULE, DELAYED RELEASE ORAL DAILY
Status: DISCONTINUED | OUTPATIENT
Start: 2018-12-27 | End: 2018-12-28 | Stop reason: HOSPADM

## 2018-12-27 RX ORDER — SODIUM CHLORIDE 0.9 % (FLUSH) 0.9 %
1-10 SYRINGE (ML) INJECTION AS NEEDED
Status: DISCONTINUED | OUTPATIENT
Start: 2018-12-27 | End: 2018-12-27 | Stop reason: HOSPADM

## 2018-12-27 RX ORDER — EPHEDRINE SULFATE 50 MG/ML
INJECTION, SOLUTION INTRAVENOUS AS NEEDED
Status: DISCONTINUED | OUTPATIENT
Start: 2018-12-27 | End: 2018-12-27 | Stop reason: SURG

## 2018-12-27 RX ORDER — PROMETHAZINE HYDROCHLORIDE 25 MG/1
25 TABLET ORAL ONCE AS NEEDED
Status: DISCONTINUED | OUTPATIENT
Start: 2018-12-27 | End: 2018-12-27 | Stop reason: HOSPADM

## 2018-12-27 RX ORDER — PROMETHAZINE HYDROCHLORIDE 25 MG/1
25 SUPPOSITORY RECTAL ONCE AS NEEDED
Status: DISCONTINUED | OUTPATIENT
Start: 2018-12-27 | End: 2018-12-27 | Stop reason: HOSPADM

## 2018-12-27 RX ORDER — ATORVASTATIN CALCIUM 10 MG/1
10 TABLET, FILM COATED ORAL DAILY
Status: DISCONTINUED | OUTPATIENT
Start: 2018-12-27 | End: 2018-12-28 | Stop reason: HOSPADM

## 2018-12-27 RX ORDER — ROPIVACAINE HYDROCHLORIDE 5 MG/ML
INJECTION, SOLUTION EPIDURAL; INFILTRATION; PERINEURAL
Status: COMPLETED | OUTPATIENT
Start: 2018-12-27 | End: 2018-12-27

## 2018-12-27 RX ORDER — MELOXICAM 15 MG/1
15 TABLET ORAL DAILY
Status: DISCONTINUED | OUTPATIENT
Start: 2018-12-27 | End: 2018-12-28 | Stop reason: HOSPADM

## 2018-12-27 RX ORDER — PROPOFOL 10 MG/ML
VIAL (ML) INTRAVENOUS AS NEEDED
Status: DISCONTINUED | OUTPATIENT
Start: 2018-12-27 | End: 2018-12-27 | Stop reason: SURG

## 2018-12-27 RX ORDER — FLUTICASONE PROPIONATE 50 MCG
2 SPRAY, SUSPENSION (ML) NASAL DAILY
Status: DISCONTINUED | OUTPATIENT
Start: 2018-12-27 | End: 2018-12-28 | Stop reason: HOSPADM

## 2018-12-27 RX ORDER — ONDANSETRON 2 MG/ML
INJECTION INTRAMUSCULAR; INTRAVENOUS AS NEEDED
Status: DISCONTINUED | OUTPATIENT
Start: 2018-12-27 | End: 2018-12-27 | Stop reason: SURG

## 2018-12-27 RX ORDER — FAMOTIDINE 10 MG/ML
20 INJECTION, SOLUTION INTRAVENOUS ONCE
Status: COMPLETED | OUTPATIENT
Start: 2018-12-27 | End: 2018-12-27

## 2018-12-27 RX ORDER — ATROPINE SULFATE 0.4 MG/ML
AMPUL (ML) INJECTION AS NEEDED
Status: DISCONTINUED | OUTPATIENT
Start: 2018-12-27 | End: 2018-12-27 | Stop reason: SURG

## 2018-12-27 RX ORDER — ALBUTEROL SULFATE 2.5 MG/3ML
2.5 SOLUTION RESPIRATORY (INHALATION) EVERY 4 HOURS PRN
Status: DISCONTINUED | OUTPATIENT
Start: 2018-12-27 | End: 2018-12-28 | Stop reason: HOSPADM

## 2018-12-27 RX ORDER — MELOXICAM 15 MG/1
15 TABLET ORAL ONCE
Status: DISCONTINUED | OUTPATIENT
Start: 2018-12-27 | End: 2018-12-27 | Stop reason: HOSPADM

## 2018-12-27 RX ORDER — DEXAMETHASONE SODIUM PHOSPHATE 10 MG/ML
INJECTION INTRAMUSCULAR; INTRAVENOUS AS NEEDED
Status: DISCONTINUED | OUTPATIENT
Start: 2018-12-27 | End: 2018-12-27 | Stop reason: SURG

## 2018-12-27 RX ORDER — LABETALOL HYDROCHLORIDE 5 MG/ML
5 INJECTION, SOLUTION INTRAVENOUS
Status: DISCONTINUED | OUTPATIENT
Start: 2018-12-27 | End: 2018-12-27 | Stop reason: HOSPADM

## 2018-12-27 RX ORDER — NALOXONE HCL 0.4 MG/ML
0.1 VIAL (ML) INJECTION
Status: DISCONTINUED | OUTPATIENT
Start: 2018-12-27 | End: 2018-12-28 | Stop reason: HOSPADM

## 2018-12-27 RX ORDER — SODIUM CHLORIDE, SODIUM LACTATE, POTASSIUM CHLORIDE, CALCIUM CHLORIDE 600; 310; 30; 20 MG/100ML; MG/100ML; MG/100ML; MG/100ML
9 INJECTION, SOLUTION INTRAVENOUS CONTINUOUS
Status: DISCONTINUED | OUTPATIENT
Start: 2018-12-27 | End: 2018-12-27 | Stop reason: HOSPADM

## 2018-12-27 RX ORDER — OXYCODONE HYDROCHLORIDE AND ACETAMINOPHEN 5; 325 MG/1; MG/1
1 TABLET ORAL ONCE AS NEEDED
Status: DISCONTINUED | OUTPATIENT
Start: 2018-12-27 | End: 2018-12-27 | Stop reason: HOSPADM

## 2018-12-27 RX ORDER — LIDOCAINE HYDROCHLORIDE 20 MG/ML
INJECTION, SOLUTION INFILTRATION; PERINEURAL AS NEEDED
Status: DISCONTINUED | OUTPATIENT
Start: 2018-12-27 | End: 2018-12-27 | Stop reason: SURG

## 2018-12-27 RX ORDER — LIDOCAINE HYDROCHLORIDE 10 MG/ML
0.5 INJECTION, SOLUTION EPIDURAL; INFILTRATION; INTRACAUDAL; PERINEURAL ONCE AS NEEDED
Status: DISCONTINUED | OUTPATIENT
Start: 2018-12-27 | End: 2018-12-27 | Stop reason: HOSPADM

## 2018-12-27 RX ORDER — HYDROMORPHONE HYDROCHLORIDE 1 MG/ML
0.5 INJECTION, SOLUTION INTRAMUSCULAR; INTRAVENOUS; SUBCUTANEOUS
Status: DISCONTINUED | OUTPATIENT
Start: 2018-12-27 | End: 2018-12-27 | Stop reason: HOSPADM

## 2018-12-27 RX ORDER — OXYCODONE AND ACETAMINOPHEN 7.5; 325 MG/1; MG/1
1 TABLET ORAL ONCE AS NEEDED
Status: DISCONTINUED | OUTPATIENT
Start: 2018-12-27 | End: 2018-12-27 | Stop reason: HOSPADM

## 2018-12-27 RX ORDER — NALOXONE HCL 0.4 MG/ML
0.2 VIAL (ML) INJECTION AS NEEDED
Status: DISCONTINUED | OUTPATIENT
Start: 2018-12-27 | End: 2018-12-27 | Stop reason: HOSPADM

## 2018-12-27 RX ORDER — DOCUSATE SODIUM 100 MG/1
100 CAPSULE, LIQUID FILLED ORAL 2 TIMES DAILY
Status: DISCONTINUED | OUTPATIENT
Start: 2018-12-27 | End: 2018-12-28 | Stop reason: HOSPADM

## 2018-12-27 RX ORDER — ACETAMINOPHEN 325 MG/1
650 TABLET ORAL EVERY 4 HOURS PRN
Status: DISCONTINUED | OUTPATIENT
Start: 2018-12-27 | End: 2018-12-28 | Stop reason: HOSPADM

## 2018-12-27 RX ORDER — OXYCODONE AND ACETAMINOPHEN 7.5; 325 MG/1; MG/1
2 TABLET ORAL EVERY 4 HOURS PRN
Status: DISCONTINUED | OUTPATIENT
Start: 2018-12-27 | End: 2018-12-28 | Stop reason: HOSPADM

## 2018-12-27 RX ORDER — ONDANSETRON 4 MG/1
4 TABLET, FILM COATED ORAL EVERY 6 HOURS PRN
Status: DISCONTINUED | OUTPATIENT
Start: 2018-12-27 | End: 2018-12-28 | Stop reason: HOSPADM

## 2018-12-27 RX ORDER — MAGNESIUM HYDROXIDE 1200 MG/15ML
LIQUID ORAL AS NEEDED
Status: DISCONTINUED | OUTPATIENT
Start: 2018-12-27 | End: 2018-12-27 | Stop reason: HOSPADM

## 2018-12-27 RX ORDER — PROMETHAZINE HYDROCHLORIDE 25 MG/ML
12.5 INJECTION, SOLUTION INTRAMUSCULAR; INTRAVENOUS ONCE AS NEEDED
Status: DISCONTINUED | OUTPATIENT
Start: 2018-12-27 | End: 2018-12-27 | Stop reason: HOSPADM

## 2018-12-27 RX ORDER — MIDAZOLAM HYDROCHLORIDE 1 MG/ML
1 INJECTION INTRAMUSCULAR; INTRAVENOUS
Status: DISCONTINUED | OUTPATIENT
Start: 2018-12-27 | End: 2018-12-27 | Stop reason: HOSPADM

## 2018-12-27 RX ORDER — DIPHENHYDRAMINE HYDROCHLORIDE 50 MG/ML
12.5 INJECTION INTRAMUSCULAR; INTRAVENOUS
Status: DISCONTINUED | OUTPATIENT
Start: 2018-12-27 | End: 2018-12-27 | Stop reason: HOSPADM

## 2018-12-27 RX ORDER — FLUMAZENIL 0.1 MG/ML
0.2 INJECTION INTRAVENOUS AS NEEDED
Status: DISCONTINUED | OUTPATIENT
Start: 2018-12-27 | End: 2018-12-27 | Stop reason: HOSPADM

## 2018-12-27 RX ORDER — PREGABALIN 75 MG/1
150 CAPSULE ORAL ONCE
Status: COMPLETED | OUTPATIENT
Start: 2018-12-27 | End: 2018-12-27

## 2018-12-27 RX ORDER — BISACODYL 10 MG
10 SUPPOSITORY, RECTAL RECTAL DAILY PRN
Status: DISCONTINUED | OUTPATIENT
Start: 2018-12-27 | End: 2018-12-28 | Stop reason: HOSPADM

## 2018-12-27 RX ORDER — ALBUTEROL SULFATE 90 UG/1
2 AEROSOL, METERED RESPIRATORY (INHALATION) EVERY 4 HOURS PRN
Status: DISCONTINUED | OUTPATIENT
Start: 2018-12-27 | End: 2018-12-27 | Stop reason: CLARIF

## 2018-12-27 RX ORDER — FENTANYL CITRATE 50 UG/ML
50 INJECTION, SOLUTION INTRAMUSCULAR; INTRAVENOUS
Status: DISCONTINUED | OUTPATIENT
Start: 2018-12-27 | End: 2018-12-27 | Stop reason: HOSPADM

## 2018-12-27 RX ORDER — ACETAMINOPHEN 325 MG/1
650 TABLET ORAL ONCE AS NEEDED
Status: DISCONTINUED | OUTPATIENT
Start: 2018-12-27 | End: 2018-12-27 | Stop reason: HOSPADM

## 2018-12-27 RX ORDER — FENTANYL CITRATE 50 UG/ML
INJECTION, SOLUTION INTRAMUSCULAR; INTRAVENOUS AS NEEDED
Status: DISCONTINUED | OUTPATIENT
Start: 2018-12-27 | End: 2018-12-27 | Stop reason: SURG

## 2018-12-27 RX ORDER — TRANEXAMIC ACID 100 MG/ML
INJECTION, SOLUTION INTRAVENOUS AS NEEDED
Status: DISCONTINUED | OUTPATIENT
Start: 2018-12-27 | End: 2018-12-27 | Stop reason: SURG

## 2018-12-27 RX ORDER — OXYCODONE AND ACETAMINOPHEN 7.5; 325 MG/1; MG/1
1 TABLET ORAL EVERY 4 HOURS PRN
Status: DISCONTINUED | OUTPATIENT
Start: 2018-12-27 | End: 2018-12-28 | Stop reason: HOSPADM

## 2018-12-27 RX ORDER — ONDANSETRON 4 MG/1
4 TABLET, ORALLY DISINTEGRATING ORAL EVERY 6 HOURS PRN
Status: DISCONTINUED | OUTPATIENT
Start: 2018-12-27 | End: 2018-12-28 | Stop reason: HOSPADM

## 2018-12-27 RX ORDER — ONDANSETRON 2 MG/ML
4 INJECTION INTRAMUSCULAR; INTRAVENOUS ONCE AS NEEDED
Status: DISCONTINUED | OUTPATIENT
Start: 2018-12-27 | End: 2018-12-27 | Stop reason: HOSPADM

## 2018-12-27 RX ORDER — SODIUM CHLORIDE, SODIUM LACTATE, POTASSIUM CHLORIDE, CALCIUM CHLORIDE 600; 310; 30; 20 MG/100ML; MG/100ML; MG/100ML; MG/100ML
100 INJECTION, SOLUTION INTRAVENOUS CONTINUOUS
Status: DISCONTINUED | OUTPATIENT
Start: 2018-12-27 | End: 2018-12-28 | Stop reason: HOSPADM

## 2018-12-27 RX ORDER — GLYCOPYRROLATE 0.2 MG/ML
INJECTION INTRAMUSCULAR; INTRAVENOUS AS NEEDED
Status: DISCONTINUED | OUTPATIENT
Start: 2018-12-27 | End: 2018-12-27 | Stop reason: SURG

## 2018-12-27 RX ORDER — ASPIRIN 325 MG
325 TABLET, DELAYED RELEASE (ENTERIC COATED) ORAL DAILY
Status: DISCONTINUED | OUTPATIENT
Start: 2018-12-28 | End: 2018-12-28 | Stop reason: HOSPADM

## 2018-12-27 RX ORDER — BUDESONIDE AND FORMOTEROL FUMARATE DIHYDRATE 160; 4.5 UG/1; UG/1
2 AEROSOL RESPIRATORY (INHALATION)
Status: DISCONTINUED | OUTPATIENT
Start: 2018-12-27 | End: 2018-12-28 | Stop reason: HOSPADM

## 2018-12-27 RX ORDER — HYDRALAZINE HYDROCHLORIDE 20 MG/ML
5 INJECTION INTRAMUSCULAR; INTRAVENOUS
Status: DISCONTINUED | OUTPATIENT
Start: 2018-12-27 | End: 2018-12-27 | Stop reason: HOSPADM

## 2018-12-27 RX ADMIN — DULOXETINE HYDROCHLORIDE 60 MG: 60 CAPSULE, DELAYED RELEASE ORAL at 14:08

## 2018-12-27 RX ADMIN — PREGABALIN 150 MG: 75 CAPSULE ORAL at 06:26

## 2018-12-27 RX ADMIN — FENTANYL CITRATE 50 MCG: 50 INJECTION, SOLUTION INTRAMUSCULAR; INTRAVENOUS at 07:36

## 2018-12-27 RX ADMIN — DOCUSATE SODIUM 100 MG: 100 CAPSULE, LIQUID FILLED ORAL at 21:35

## 2018-12-27 RX ADMIN — SODIUM CHLORIDE 750 MG: 900 INJECTION, SOLUTION INTRAVENOUS at 20:18

## 2018-12-27 RX ADMIN — FAMOTIDINE 20 MG: 10 INJECTION, SOLUTION INTRAVENOUS at 07:36

## 2018-12-27 RX ADMIN — MELOXICAM 15 MG: 15 TABLET ORAL at 14:08

## 2018-12-27 RX ADMIN — PHENYLEPHRINE HYDROCHLORIDE 100 MCG: 10 INJECTION INTRAVENOUS at 08:50

## 2018-12-27 RX ADMIN — EPHEDRINE SULFATE 10 MG: 50 INJECTION INTRAMUSCULAR; INTRAVENOUS; SUBCUTANEOUS at 08:46

## 2018-12-27 RX ADMIN — PANTOPRAZOLE SODIUM 40 MG: 40 TABLET, DELAYED RELEASE ORAL at 14:09

## 2018-12-27 RX ADMIN — DOCUSATE SODIUM 100 MG: 100 CAPSULE, LIQUID FILLED ORAL at 14:08

## 2018-12-27 RX ADMIN — TRANEXAMIC ACID 1000 MG: 100 INJECTION, SOLUTION INTRAVENOUS at 09:08

## 2018-12-27 RX ADMIN — ONDANSETRON 4 MG: 2 INJECTION INTRAMUSCULAR; INTRAVENOUS at 09:35

## 2018-12-27 RX ADMIN — PROPOFOL 150 MG: 10 INJECTION, EMULSION INTRAVENOUS at 08:33

## 2018-12-27 RX ADMIN — ATROPINE SULFATE 0.4 MG: 0.4 INJECTION, SOLUTION INTRAMUSCULAR; INTRAVENOUS; SUBCUTANEOUS at 08:58

## 2018-12-27 RX ADMIN — FENTANYL CITRATE 50 MCG: 50 INJECTION, SOLUTION INTRAMUSCULAR; INTRAVENOUS at 11:08

## 2018-12-27 RX ADMIN — ATORVASTATIN CALCIUM 10 MG: 10 TABLET, FILM COATED ORAL at 14:08

## 2018-12-27 RX ADMIN — BUDESONIDE AND FORMOTEROL FUMARATE DIHYDRATE 2 PUFF: 160; 4.5 AEROSOL RESPIRATORY (INHALATION) at 20:20

## 2018-12-27 RX ADMIN — FENTANYL CITRATE 50 MCG: 50 INJECTION INTRAMUSCULAR; INTRAVENOUS at 09:13

## 2018-12-27 RX ADMIN — EPHEDRINE SULFATE 10 MG: 50 INJECTION INTRAMUSCULAR; INTRAVENOUS; SUBCUTANEOUS at 08:53

## 2018-12-27 RX ADMIN — MIDAZOLAM HYDROCHLORIDE 1 MG: 2 INJECTION, SOLUTION INTRAMUSCULAR; INTRAVENOUS at 07:36

## 2018-12-27 RX ADMIN — ROPIVACAINE HYDROCHLORIDE 20 ML: 5 INJECTION, SOLUTION EPIDURAL; INFILTRATION; PERINEURAL at 07:29

## 2018-12-27 RX ADMIN — EPHEDRINE SULFATE 10 MG: 50 INJECTION INTRAMUSCULAR; INTRAVENOUS; SUBCUTANEOUS at 08:49

## 2018-12-27 RX ADMIN — OXYCODONE HYDROCHLORIDE AND ACETAMINOPHEN 1 TABLET: 7.5; 325 TABLET ORAL at 14:09

## 2018-12-27 RX ADMIN — FENTANYL CITRATE 50 MCG: 50 INJECTION INTRAMUSCULAR; INTRAVENOUS at 09:07

## 2018-12-27 RX ADMIN — PHENYLEPHRINE HYDROCHLORIDE 100 MCG: 10 INJECTION INTRAVENOUS at 08:54

## 2018-12-27 RX ADMIN — MIDAZOLAM HYDROCHLORIDE 1 MG: 2 INJECTION, SOLUTION INTRAMUSCULAR; INTRAVENOUS at 07:40

## 2018-12-27 RX ADMIN — EPHEDRINE SULFATE 10 MG: 50 INJECTION INTRAMUSCULAR; INTRAVENOUS; SUBCUTANEOUS at 08:51

## 2018-12-27 RX ADMIN — SODIUM CHLORIDE 750 MG: 900 INJECTION, SOLUTION INTRAVENOUS at 06:25

## 2018-12-27 RX ADMIN — LIDOCAINE HYDROCHLORIDE 60 MG: 20 INJECTION, SOLUTION INFILTRATION; PERINEURAL at 08:33

## 2018-12-27 RX ADMIN — MUPIROCIN 10 APPLICATION: 20 OINTMENT TOPICAL at 14:09

## 2018-12-27 RX ADMIN — DEXAMETHASONE SODIUM PHOSPHATE 8 MG: 10 INJECTION INTRAMUSCULAR; INTRAVENOUS at 08:57

## 2018-12-27 RX ADMIN — MUPIROCIN 10 APPLICATION: 20 OINTMENT TOPICAL at 21:35

## 2018-12-27 RX ADMIN — MEPIVACAINE HYDROCHLORIDE 10 ML: 20 INJECTION, SOLUTION EPIDURAL; INFILTRATION at 07:29

## 2018-12-27 RX ADMIN — PHENYLEPHRINE HYDROCHLORIDE 100 MCG: 10 INJECTION INTRAVENOUS at 10:01

## 2018-12-27 RX ADMIN — SODIUM CHLORIDE, POTASSIUM CHLORIDE, SODIUM LACTATE AND CALCIUM CHLORIDE 9 ML/HR: 600; 310; 30; 20 INJECTION, SOLUTION INTRAVENOUS at 07:38

## 2018-12-27 RX ADMIN — TRANEXAMIC ACID 1000 MG: 100 INJECTION, SOLUTION INTRAVENOUS at 09:54

## 2018-12-27 RX ADMIN — FENTANYL CITRATE 50 MCG: 50 INJECTION INTRAMUSCULAR; INTRAVENOUS at 08:33

## 2018-12-27 RX ADMIN — GLYCOPYRROLATE 0.2 MG: 0.2 INJECTION INTRAMUSCULAR; INTRAVENOUS at 08:58

## 2018-12-27 RX ADMIN — SODIUM CHLORIDE, POTASSIUM CHLORIDE, SODIUM LACTATE AND CALCIUM CHLORIDE: 600; 310; 30; 20 INJECTION, SOLUTION INTRAVENOUS at 09:09

## 2018-12-27 RX ADMIN — GLYCOPYRROLATE 0.2 MG: 0.2 INJECTION INTRAMUSCULAR; INTRAVENOUS at 08:57

## 2018-12-27 RX ADMIN — PHENYLEPHRINE HYDROCHLORIDE 100 MCG: 10 INJECTION INTRAVENOUS at 08:56

## 2018-12-27 RX ADMIN — SODIUM CHLORIDE, POTASSIUM CHLORIDE, SODIUM LACTATE AND CALCIUM CHLORIDE 500 ML: 600; 310; 30; 20 INJECTION, SOLUTION INTRAVENOUS at 06:25

## 2018-12-27 NOTE — ANESTHESIA PROCEDURE NOTES
ANESTHESIA INTUBATION  Urgency: elective    Airway not difficult    General Information and Staff    Patient location during procedure: OR  CRNA: Ama Matias CRNA    Indications and Patient Condition  Indications for airway management: airway protection    Preoxygenated: yes  Mask difficulty assessment: 1 - vent by mask    Final Airway Details  Final airway type: supraglottic airway      Successful airway: classic  Size 4    Number of attempts at approach: 1    Additional Comments  LMA placed easily.  Cuff MOP.

## 2018-12-27 NOTE — ANESTHESIA PROCEDURE NOTES
Peripheral Block    Pre-sedation assessment completed: 12/27/2018 7:29 AM    Patient reassessed immediately prior to procedure    Patient location during procedure: holding area  Start time: 12/27/2018 7:29 AM  Stop time: 12/27/2018 7:39 AM  Reason for block: at surgeon's request and post-op pain management  Performed by  Anesthesiologist: Brandon Samuels MD  Preanesthetic Checklist  Completed: patient identified, site marked, surgical consent, pre-op evaluation, timeout performed, IV checked, risks and benefits discussed and monitors and equipment checked  Prep:  Sterile barriers:cap, gloves, gown, mask and sterile barriers  Prep: ChloraPrep  Patient monitoring: blood pressure monitoring, continuous pulse oximetry and EKG  Procedure  Sedation:yes    Guidance:ultrasound guided  ULTRASOUND INTERPRETATION.  Using ultrasound guidance a 21 G gauge needle was placed in close proximity to the femoral nerve, at which point, under ultrasound guidance anesthetic was injected in the area of the nerve and spread of the anesthesia was seen on ultrasound in close proximity thereto.  There were no abnormalities seen on ultrasound; a digital image was taken; and the patient tolerated the procedure with no complications. Images:still images obtained    Laterality:right  Block Type:adductor canal block  Injection Technique:single-shot  Needle Type:echogenic  Needle Gauge:21 G    Medications Used: ropivacaine (NAROPIN) 0.5 % injection, 20 mL  mepivacaine PF (CARBOCAINE) 2 % injection, 10 mL  Post Assessment  Injection Assessment: negative aspiration for heme, no paresthesia on injection and incremental injection  Patient Tolerance:comfortable throughout block  Complications:no

## 2018-12-27 NOTE — BRIEF OP NOTE
TOTAL KNEE ARTHROPLASTY  Progress Note    Елена Beach  12/27/2018    Pre-op Diagnosis:   Arthritis of knee [M17.10]       Post-Op Diagnosis Codes:     * Arthritis of knee [M17.10]    Procedure/CPT® Codes:      Procedure(s):  TOTAL KNEE ARTHROPLASTY    Surgeon(s):  Gerry Estevez MD    Anesthesia: General    Staff:   Circulator: Angelita Wallace RN  Scrub Person: Chino Read  Vendor Representative: Breanna Bella Paul  Assistant: Bebeto Fu CSA    Estimated Blood Loss: minimal    Urine Voided: * No values recorded between 12/27/2018  8:27 AM and 12/27/2018 10:03 AM *    Specimens:                None      Drains:   Closed/Suction Drain 1 Right Knee [employer]ordion (Active)       Findings: see dictation    Complications: none      Gerry Estevez MD     Date: 12/27/2018  Time: 10:03 AM

## 2018-12-27 NOTE — PLAN OF CARE
Problem: Patient Care Overview  Goal: Plan of Care Review   12/27/18 1521   OTHER   Outcome Summary Pt is s/p R TKA and presents with pain, limited ROm and antalgic gait. Pt was groggy after pain medicine and very unsteady when walking with walker. Pt would benefit from PT to address these impairments.

## 2018-12-27 NOTE — OP NOTE
Orthopedic Operative Note      Facility: [facility]      Patient: Елена Beach     Medical Record Number: [id number]     YOB: 1945     Dictating Surgeon: Gerry Estevez M.D.     Primary Care Physician: Caleb Velez APRN     Date of Operation: 12/27/2018   ________________________________________________________________________      PREOPERATIVE DIAGNOSIS: Right knee endstage osteoarthritis      POSTOPERATIVE DIAGNOSIS:  Right knee endstage osteoarthritis      PROCEDURE PERFORMED: TOTAL KNEE ARTHROPLASTY      SURGEON: Gerry Estevez MD       ASSISTANT: Bebeto Fu      ANESTHESIA:    1. Regional followed general.    2. Local administration of Exparel solution      COMPLICATION: None.       ESTIMATED BLOOD LOSS: Less than 100 mL.       Specimens:   Order Name Source Comment Collection Info Order Time   TYPE AND SCREEN   Collected By: Darlin Roman RN 12/27/2018  5:50 AM         TOURNIQUET TIME: Approximately 60 minutes.       IMPLANTS:    1. Smith and Nephew size 3 Legion Oxinium femoral   component.    2. Smith and Nephew size 2 Gisselle II tibial baseplate.    3. Size 11 mm deep dish polyethylene insert.    3. Size 29 by 7.5 mm patellar component.       INDICATIONS: The patient is a 73 y.o. female with a long history of   worsening knee osteoarthritis.  The patient had tried and failed prolonged conservative treatment.   Following a thorough discussion of all options including both conservative and operative options,   the patient elected to proceed with a total knee arthroplasty.      INFORMED CONSENT: The risks, benefits, and alternatives to a total knee arthroplasty   were discussed with patient in detail. I explained that surgical risks include infection, hematoma,   hardware related complications including failure of fixation, loosening, fracture, persistent pain   and/or loss of motion, post-operative arthrofibrosis, iatrogenic nerve and/or blood vessel injury    resulting in permanent weakness, numbness or dysfunction, DVT, PE, positioning related   neuropraxia, and anesthesia related complications resulting in death.    Patient did acknowledge   understanding the information and consented to proceed.         DESCRIPTION OF PROCEDURE: The patient and operative site were identified in the   preoperative holding area. The surgical site was marked. Following this,   adequate regional anesthesia of the operative extremity was administered. The   patient was then taken to the operating room and placed in the supine position.   Adequate general anesthesia was administered and then patient was repositioned on   the operating table in the supine position. A timeout was taken and preoperative   antibiotics administered.      The extremity was cleaned and prepped in the standard sterile fashion. I cleaned   the extremity with an alcohol solution. A Hibiclens scrub was performed and   then the extremity was prepped with 2 ChloraPreps. I allowed this to dry for 3   minutes before the draping procedure was carried out. The extremity was   exsanguinated with an Esmarch bandage and the tourniquet insufflated to 250   mmHg. Again, a timeout had been taken and preoperative antibiotics administered   prior to surgical incision.     An approximately 10 cm incision was fashioned over   the anterior aspect of the knee. This was carried down through the skin and   subcutaneous tissues. Full-thickness medial and lateral skin flaps were   developed. The underlying extensor mechanism was exposed. A standard medial   marce-patellar arthrotomy was performed and the joint was entered. The   infrapatellar fat pad was removed. The anterior horns of the medial and lateral   menisci were released along with the ACL. The joint was examined. The joint demonstrated   extensive arthrosis involving all 3 compartments. The medial compartment   demonstrated extensive wear and this matched up with the  preoperative imaging and   the deformity noted on exam.      An opening was created in the distal femur and the intramedullary   alignment vahe for the distal femoral cutting guide was inserted. The 5 degree valgus   guide was pinned into position and then the distal femoral cut carried out in   typical fashion. The cut portion of the bone was removed followed by the cutting   guide. The posterior referencing guide was then inserted and I then measured the   distal femur. It measured a 3. The 3 cutting block was pinned into position and   then the anterior posterior and chamfer cuts were carried out in typical   fashion. The cut portions of the bone were removed. The cuts seemed to match up   very nicely with the cortex. Some of the peripheral osteophytes were removed   at this point. I then directed my attention to the tibia.        The cutting guide for the tibia was positioned. This was centered at the knee and   ankle. I placed the alignment vahe parallel to the anterior face of the tibia. I   made sure to measure roughly 9 mm off the high or lateral side and 2 mm off   the low or medial side. I checked the cuts several times to make sure the   alignment was appropriate and then pinned the guide into position.  I again   checked the cuts and positioned retractors appropriately to allow for   protection of the neurovascular structures. An oscillating saw was used to carry   out the tibial cut in typical fashion, taking care not to penetrate posteriorly so   as to prevent iatrogenic injury to the neurovascular structures. The cut portion of   the bone was removed and then I examined the cut. I checked the flexion and   extension gaps. They seemed to be well-balanced. The medial and lateral menisci   were removed. The posterior capsule was carefully infiltrated with some of the   Exparel solution, taking care to aspirate before injecting.     Next, I directed my attention to the trialing process.  I trialed with a 3    femur and 2 tibia.  These seemed to fit well and restored good motion and stability   with an 11 trial polyethylene. When the tibial and femoral preparations were   completed, I had my assistant mix 1 batch of Palacos bone cement using current   generation cement mixing technique. While my assistant was mixing the cement, I   examined the patella.        This structure demonstrated significant arthrosis and I feel that resurfacing was necessary.     The patella was reamed and prepared in typical fashion. I trialed   with the 29 mm trial patellar component. The patella seemed to track well. I   did not feel that a lateral release was necessary in this case. Once the cement   was prepared, the components were impacted into position. The excess extruded   cement was carefully removed with a Cleveland elevator. The knee was left in full   extension with the 11 trial polyethylene in position. I also kept the patella   clamped until the cement had fully cured.     Once the cement had cured, I again trialed the knee with the trial implant in place.     Again, it demonstrated excellent motion and stability.   At this point,     the trial polyethylene was removed and the final component impacted   into position. I took care to make sure the medial and lateral dovetails were   fully interdigitated and that the polyethylene was well-seated before again   carrying the knee through a range of motion. Again, the knee demonstrated   excellent motion and stability. Again, the patella was noted to track well.  The   knee demonstrated full extension and flexion past 130 degrees.  The fllexion and   extension gaps seemed to be well-balanced. The knee was irrigated out with 500   mL of a Betadine-containing saline solution followed by 3L of sterile saline   mixed with bacitracin via pulsatile lavage. I infiltrated the periarticular   soft tissues with the remaining Exparel solution. The leg was placed in about   60' of flexion and then  the extensor mechanism repaired using multiple 0 Vicryl   sutures and a running PDS, interrupted Vicryl was used to repair the subcutaneous, and then a   running subcuticular Monocryl stitch was used to close the skin followed by   Dermabond.     Sterile dressings were applied to the wound and the drapes were   withdrawn. The patient tolerated the procedure well. There were no   complications. The patient was taken to the recovery room in good condition.         Gerry Estevez M.D.      Date: 12/27/2018     cc: Caleb Veelz APRN

## 2018-12-27 NOTE — THERAPY EVALUATION
Acute Care - Physical Therapy Initial Evaluation  [facility]     Patient Name: Елена Beach  : 1945  MRN: [id number]  Today's Date: 2018                Admit Date: 2018    Visit Dx:     ICD-10-CM ICD-9-CM   1. Arthritis of knee M17.10 716.96     Patient Active Problem List   Diagnosis   • Fibromyalgia   • Arthritis   • Hiatal hernia   • Anxiety and depression   • Asthma   • Hyperlipidemia   • Iron deficiency anemia   • Chronic obstructive pulmonary disease (CMS/[facility])   • Acute URI   • Chronic fatigue   • Snoring   • Dependence on nicotine from cigarettes   • Insomnia   • Osteopenia   • Screening for diabetes mellitus   • Bronchitis   • Arthritis of knee   • Osteoarthritis of knee, unspecified     Past Medical History:   Diagnosis Date   • Anemia    • Arthritis    • Asthma    • Back pain    • Bursitis    • COPD (chronic obstructive pulmonary disease) (CMS/[facility])    • Fibromyalgia    • Fibromyositis    • GERD (gastroesophageal reflux disease)    • Hiatal hernia    • History of pneumonia    • Hyperlipidemia    • Knee pain    • Osteoarthritis    • Osteoporosis    • PONV (postoperative nausea and vomiting)    • Scoliosis      Past Surgical History:   Procedure Laterality Date   • COLONOSCOPY     • ENDOSCOPY     • FOOT SURGERY Right 2006   • HAND SURGERY     • SHOULDER ARTHROSCOPY     • SHOULDER SURGERY Left    • TOE SURGERY Right    • TOOTH EXTRACTION     • TUBAL ABDOMINAL LIGATION     • WRIST SURGERY          PT ASSESSMENT (last 12 hours)      Physical Therapy Evaluation     Row Name 18 1511          PT Evaluation Time/Intention    Subjective Information  no complaints  -KH     Document Type  evaluation  -KH     Mode of Treatment  physical therapy  -KH     Patient Effort  adequate  -KH     Symptoms Noted During/After Treatment  -- very groggy  -     Row Name 18 1511          General Information    Patient Observations  cooperative;agree to therapy  -KH      General Observations of Patient  in bed  -     Prior Level of Function  independent:  -     Equipment Currently Used at Home  none  -     Pertinent History of Current Functional Problem  pt is s/p R TKA  -[facility] Name 12/27/18 1511          Relationship/Environment    Lives With  alone  -[facility] Name 12/27/18 1511          Resource/Environmental Concerns    Current Living Arrangements  home/apartment/condo plans to d/c to boyfriend's house  -[facility] Name 12/27/18 1511          Home Main Entrance    Number of Stairs, Main Entrance  three  -[facility] Name 12/27/18 1511          Cognitive Assessment/Interventions    Additional Documentation  Cognitive Assessment/Intervention (Group)  -[facility] Name 12/27/18 1511          Cognitive Assessment/Intervention- PT/OT    Orientation Status (Cognition)  oriented to;person  -     Follows Commands (Cognition)  WNL  -     Personal Safety Interventions  fall prevention program maintained;gait belt;nonskid shoes/slippers when out of bed  -[facility] Name 12/27/18 1511          Mobility Assessment/Treatment    Extremity Weight-bearing Status  right lower extremity  -     Right Lower Extremity (Weight-bearing Status)  weight-bearing as tolerated (WBAT)  -[facility] Name 12/27/18 1511          Bed Mobility Assessment/Treatment    Bed Mobility Assessment/Treatment  supine-sit;sit-supine  -[facility] Name 12/27/18 1511          Transfer Assessment/Treatment    Transfer Assessment/Treatment  sit-stand transfer;stand-sit transfer  -     Sit-Stand [place]key (Transfers)  minimum assist (75% patient effort)  -     Stand-Sit [place]key (Transfers)  minimum assist (75% patient effort)  -[facility] Name 12/27/18 1511          Sit-Stand Transfer    Assistive Device (Sit-Stand Transfers)  walker, front-wheeled  -[facility] Name 12/27/18 1511          Stand-Sit Transfer    Assistive Device (Stand-Sit Transfers)  walker, front-wheeled  -[facility] Name 12/27/18 1511           Gait/Stairs Assessment/Training    [place] Level (Gait)  minimum assist (75% patient effort)  -     Assistive Device (Gait)  walker, front-wheeled  -     Distance in Feet (Gait)  20  -KH     Pattern (Gait)  step-to  -     Deviations/Abnormal Patterns (Gait)  antalgic;gait speed decreased;stride length decreased;ana paula decreased  -     Bilateral Gait Deviations  forward flexed posture;heel strike decreased  -     Comment (Gait/Stairs)  unsteady, but no gross LOB  -[facility] Name 12/27/18 1511          General ROM    GENERAL ROM COMMENTS  WFL except R knee  -KH     Row Name 12/27/18 1511          MMT (Manual Muscle Testing)    General MMT Comments  WFL  -[facility] 12/27/18 1511          Motor Assessment/Intervention    Additional Documentation  Therapeutic Exercise Interventions (Group)  -KH     Row Name 12/27/18 1511          Therapeutic Exercise    Comment (Therapeutic Exercise)  R TKA protocol x 10 reps  -KH     Row Name 12/27/18 1511          Pain Assessment    Additional Documentation  Pain Scale: Numbers Pre/Post-Treatment (Group)  -KH     Row Name 12/27/18 1511          Pain Scale: Numbers Pre/Post-Treatment    Pain Scale: Numbers, Pretreatment  2/10  -KH     Pain Scale: Numbers, Post-Treatment  3/10  -KH     Pain Location - Side  Right  -     Pain Location  knee  -     Pain Intervention(s)  Cold applied;Repositioned  -KH     Row Name             Wound 12/27/18 0942 Right knee incision    Wound - Properties Group Date first assessed: 12/27/18  -AS Time first assessed: 0942  -AS Side: Right  -AS Location: knee  -AS Type: incision  -AS    Row Name             Wound 12/27/18 0959 Right leg incision    Wound - Properties Group Date first assessed: 12/27/18  -AS Time first assessed: 0959  -AS Side: Right  -AS Location: leg  -AS Type: incision  -AS    [facility] Name 12/27/18 1511          Plan of Care Review    Plan of Care Reviewed With  patient  -KH     Row Name 12/27/18 1511           Physical Therapy Clinical Impression    Patient/Family Goals Statement (PT Clinical Impression)  return home to [facility]     Criteria for Skilled Interventions Met (PT Clinical Impression)  treatment indicated  -     Rehab Potential (PT Clinical Summary)  good, to achieve stated therapy goals  -     Row Name 12/27/18 1511          Physical Therapy Goals    Bed Mobility Goal Selection (PT)  bed mobility, PT goal 1  -     Transfer Goal Selection (PT)  transfer, PT goal 1  -     Gait Training Goal Selection (PT)  gait training, PT goal 1  -KH     ROM Goal Selection (PT)  ROM, PT goal 1  -KH     Stairs Goal Selection (PT)  stairs, PT goal 1  -KH     Additional Documentation  ROM Goal Selection (PT) (Row);Stairs Goal Selection (PT) (Row)  -     Row Name 12/27/18 1511          Bed Mobility Goal 1 (PT)    Activity/Assistive Device (Bed Mobility Goal 1, PT)  bed mobility activities, all  -KH     [place] Level/Cues Needed (Bed Mobility Goal 1, PT)  supervision required  -KH     Time Frame (Bed Mobility Goal 1, PT)  3 days  -     Row Name 12/27/18 1511          Transfer Goal 1 (PT)    Activity/Assistive Device (Transfer Goal 1, PT)  transfers, all;walker, rolling  -KH     [place] Level/Cues Needed (Transfer Goal 1, PT)  supervision required  -KH     Time Frame (Transfer Goal 1, PT)  3 days  -     Row Name 12/27/18 1511          Gait Training Goal 1 (PT)    Activity/Assistive Device (Gait Training Goal 1, PT)  gait (walking locomotion);walker, rolling  -KH     [place] Level (Gait Training Goal 1, PT)  supervision required  -KH     Distance (Gait Goal 1, PT)  100  -KH     Time Frame (Gait Training Goal 1, PT)  3 days  -     Row Name 12/27/18 1511          ROM Goal 1 (PT)    ROM Goal 1 (PT)  R knee o-90  -KH     Time Frame (ROM Goal 1, PT)  3 days  -     Row Name 12/27/18 1511          Stairs Goal 1 (PT)    Activity/Assistive Device (Stairs Goal 1, PT)  stairs, all skills  -     [place]  Level/Cues Needed (Stairs Goal 1, PT)  contact guard assist  -     Number of Stairs (Stairs Goal 1, PT)  3  -KH     Time Frame (Stairs Goal 1, PT)  3 days  -     Row Name 12/27/18 1511          Patient Education Goal (PT)    Activity (Patient Education Goal, PT)  HEP  -     [place]/Cues/Accuracy (Memory Goal 2, PT)  demonstrates adequately  -     Time Frame (Patient Education Goal, PT)  3 days  -     Row Name 12/27/18 1511          Positioning and Restraints    Pre-Treatment Position  in bed  -     Post Treatment Position  chair  -     In Chair  reclined;call light within reach;encouraged to call for assist;notified nsg alarm pad in chair under pt, but no box. Nursing notified  -     Row Name 12/27/18 1511          Living Environment    Home Accessibility  stairs to enter home  -       User Key  (r) = Recorded By, (t) = Taken By, (c) = Cosigned By    Initials Name Provider Type     Diann Ye, PT Physical Therapist    AS Angelita Wallace RN Registered Nurse        Physical Therapy Education     Title: PT OT SLP Therapies (Done)     Topic: Physical Therapy (Done)     Point: Mobility training (Done)     Learning Progress Summary           Patient Acceptance, E,D, VU,NR by  at 12/27/2018  3:21 PM                   Point: Home exercise program (Done)     Learning Progress Summary           Patient Acceptance, E,D, VU,NR by  at 12/27/2018  3:21 PM                   Point: Body mechanics (Done)     Learning Progress Summary           Patient Acceptance, E,D, VU,NR by  at 12/27/2018  3:21 PM                   Point: Precautions (Done)     Learning Progress Summary           Patient Acceptance, E,D, VU,NR by  at 12/27/2018  3:21 PM                               User Key     Initials Effective Dates Name Provider Type [facility] 06/08/18 -  Diann Ye, PT Physical Therapist PT              PT Recommendation and Plan  Anticipated Discharge Disposition (PT):  home with home health  Planned Therapy Interventions (PT Eval): bed mobility training, gait training, home exercise program, patient/family education, ROM (range of motion), stair training, strengthening, transfer training  Therapy Frequency (PT Clinical Impression): 2 times/day  Outcome Summary/Treatment Plan (PT)  Anticipated Discharge Disposition (PT): home with home health  Plan of Care Reviewed With: patient  Outcome Summary: Pt is s/p R TKA and presents with pain, limited ROm and antalgic gait. Pt was groggy after pain medicine and very unsteady when walking with walker. Pt would benefit from PT to address these impairments.   Outcome Measures     Row Name 12/27/18 1500             How much help from another person do you currently need...    Turning from your back to your side while in flat bed without using bedrails?  3  -KH      Moving from lying on back to sitting on the side of a flat bed without bedrails?  3  -KH      Moving to and from a bed to a chair (including a wheelchair)?  3  -KH      Standing up from a chair using your arms (e.g., wheelchair, bedside chair)?  3  -KH      Climbing 3-5 steps with a railing?  2  -KH      To walk in hospital room?  3  -KH      AM-PAC 6 Clicks Score  17  -KH         Functional Assessment    Outcome Measure Options  AM-PAC 6 Clicks Basic Mobility (PT)  -        User Key  (r) = Recorded By, (t) = Taken By, (c) = Cosigned By    Initials Name Provider Type    Diann Chavez, PT Physical Therapist         Time Calculation:   PT Charges     Row Name 12/27/18 1507             Time Calculation    Start Time  1445  -      Stop Time  1507  -      Time Calculation (min)  22 min  -      PT Received On  12/27/18  -      PT - Next Appointment  12/28/18  -      PT Goal Re-Cert Due Date  12/30/18  -         Time Calculation- PT    Total Timed Code Minutes- PT  10 minute(s)  -        User Key  (r) = Recorded By, (t) = Taken By, (c) = Cosigned By    Initials  Name Provider Type     Diann Ye, PT Physical Therapist        Therapy Suggested Charges     Code   Minutes Charges    None           Therapy Charges for Today     Code Description Service Date Service Provider Modifiers Qty    68488990986 HC PT EVAL MOD COMPLEXITY 2 12/27/2018 Diann Ye, PT GP 1    37771526446 HC PT THER PROC EA 15 MIN 12/27/2018 Diann Ye, PT GP 1          PT G-Codes  Outcome Measure Options: AM-PAC 6 Clicks Basic Mobility (PT)  AM-PAC 6 Clicks Score: 17      Diann Ye, PT  12/27/2018

## 2018-12-27 NOTE — ANESTHESIA PREPROCEDURE EVALUATION
Anesthesia Evaluation     Patient summary reviewed and Nursing notes reviewed   history of anesthetic complications: PONV               Airway   Mallampati: II  Dental      Pulmonary    (+) a smoker Former, COPD, asthma,   Cardiovascular     ECG reviewed  Rhythm: irregular  Rate: normal    (+) hyperlipidemia,       Neuro/Psych  (+) psychiatric history Depression and Anxiety,     GI/Hepatic/Renal/Endo    (+)  hiatal hernia, GERD,      Musculoskeletal     (+) back pain, myalgias,   Abdominal    Substance History - negative use     OB/GYN negative ob/gyn ROS         Other   (+) arthritis                     Anesthesia Plan    ASA 3     general with block   (Right ACBx  Trigeminy)  intravenous induction   Anesthetic plan, all risks, benefits, and alternatives have been provided, discussed and informed consent has been obtained with: patient.

## 2018-12-27 NOTE — ANESTHESIA POSTPROCEDURE EVALUATION
"Patient: Елена Beach    Procedure Summary     Date:  12/27/18 Room / Location:  [facility] OR [address] [place] [address] MAIN OR    Anesthesia Start:  0827 Anesthesia Stop:  1029    Procedure:  TOTAL KNEE ARTHROPLASTY (Right Knee) Diagnosis:       Arthritis of knee      (Arthritis of knee [M17.10])    Surgeon:  Gerry Estevez MD Provider:  Brandon Samuels MD    Anesthesia Type:  general with block ASA Status:  3          Anesthesia Type: general with block  Last vitals  BP   108/69 (12/27/18 1050)   Temp   36.4 °C (97.6 °F) (12/27/18 1025)   Pulse   92 (12/27/18 1050)   Resp   14 (12/27/18 1050)     SpO2   96 % (12/27/18 1050)     Post Anesthesia Care and Evaluation    Patient location during evaluation: PACU  Patient participation: complete - patient participated  Level of consciousness: awake and alert  Pain management: adequate  Airway patency: patent  Anesthetic complications: No anesthetic complications    Cardiovascular status: acceptable  Respiratory status: acceptable  Hydration status: acceptable    Comments: /69   Pulse 92   Temp 36.4 °C (97.6 °F) (Oral)   Resp 14   Ht 157.5 cm (62\")   Wt 50.1 kg (110 lb 7 oz)   SpO2 96%   BMI 20.20 kg/m²       "

## 2018-12-27 NOTE — PLAN OF CARE
Problem: Patient Care Overview  Goal: Plan of Care Review  Outcome: Ongoing (interventions implemented as appropriate)   12/27/18 7904   OTHER   Outcome Summary PO RTK, arrived to floor 1200 groggy from anesthesia-pt is highly sensitive to pain meds, HAZEL and ace wrap c/d/i, O2-2L, ambulated w/PT, up to chair, VSS, educated pt on importance of IS useage   Coping/Psychosocial   Plan of Care Reviewed With patient   Plan of Care Review   Progress improving     Goal: Individualization and Mutuality  Outcome: Ongoing (interventions implemented as appropriate)      Problem: Fall Risk (Adult)  Goal: Identify Related Risk Factors and Signs and Symptoms  Outcome: Outcome(s) achieved Date Met: 12/27/18    Goal: Absence of Fall  Outcome: Ongoing (interventions implemented as appropriate)      Problem: Knee Arthroplasty (Total, Partial) (Adult)  Goal: Signs and Symptoms of Listed Potential Problems Will be Absent, Minimized or Managed (Knee Arthroplasty)  Outcome: Ongoing (interventions implemented as appropriate)    Goal: Anesthesia/Sedation Recovery  Outcome: Ongoing (interventions implemented as appropriate)

## 2018-12-28 VITALS
HEIGHT: 62 IN | BODY MASS INDEX: 20.32 KG/M2 | WEIGHT: 110.44 LBS | OXYGEN SATURATION: 98 % | SYSTOLIC BLOOD PRESSURE: 95 MMHG | RESPIRATION RATE: 16 BRPM | DIASTOLIC BLOOD PRESSURE: 50 MMHG | TEMPERATURE: 98.2 F | HEART RATE: 98 BPM

## 2018-12-28 PROCEDURE — 97150 GROUP THERAPEUTIC PROCEDURES: CPT

## 2018-12-28 PROCEDURE — 94799 UNLISTED PULMONARY SVC/PX: CPT

## 2018-12-28 PROCEDURE — 63710000001 MUPIROCIN 2 % OINTMENT: Performed by: ORTHOPAEDIC SURGERY

## 2018-12-28 PROCEDURE — G0378 HOSPITAL OBSERVATION PER HR: HCPCS

## 2018-12-28 PROCEDURE — 99024 POSTOP FOLLOW-UP VISIT: CPT | Performed by: NURSE PRACTITIONER

## 2018-12-28 PROCEDURE — A9270 NON-COVERED ITEM OR SERVICE: HCPCS | Performed by: ORTHOPAEDIC SURGERY

## 2018-12-28 PROCEDURE — 63710000001 ASPIRIN EC 325 MG TABLET DELAYED-RELEASE: Performed by: ORTHOPAEDIC SURGERY

## 2018-12-28 PROCEDURE — 63710000001 OXYCODONE-ACETAMINOPHEN 7.5-325 MG TABLET: Performed by: ORTHOPAEDIC SURGERY

## 2018-12-28 PROCEDURE — 63710000001 PANTOPRAZOLE 40 MG TABLET DELAYED-RELEASE: Performed by: ORTHOPAEDIC SURGERY

## 2018-12-28 PROCEDURE — 63710000001 MELOXICAM 15 MG TABLET: Performed by: ORTHOPAEDIC SURGERY

## 2018-12-28 PROCEDURE — 63710000001 DULOXETINE 60 MG CAPSULE DELAYED-RELEASE PARTICLES: Performed by: ORTHOPAEDIC SURGERY

## 2018-12-28 PROCEDURE — 63710000001 POLYETHYLENE GLYCOL PACK: Performed by: ORTHOPAEDIC SURGERY

## 2018-12-28 PROCEDURE — 97110 THERAPEUTIC EXERCISES: CPT

## 2018-12-28 PROCEDURE — 63710000001 TIOTROPIUM 18 MCG CAPSULE: Performed by: ORTHOPAEDIC SURGERY

## 2018-12-28 PROCEDURE — 94640 AIRWAY INHALATION TREATMENT: CPT

## 2018-12-28 PROCEDURE — 63710000001 DOCUSATE SODIUM 100 MG CAPSULE: Performed by: ORTHOPAEDIC SURGERY

## 2018-12-28 RX ORDER — OXYCODONE AND ACETAMINOPHEN 7.5; 325 MG/1; MG/1
1 TABLET ORAL EVERY 4 HOURS PRN
Qty: 42 TABLET | Refills: 0 | Status: SHIPPED | OUTPATIENT
Start: 2018-12-28 | End: 2019-01-04

## 2018-12-28 RX ORDER — PSEUDOEPHEDRINE HCL 30 MG
100 TABLET ORAL 2 TIMES DAILY
Start: 2018-12-28 | End: 2019-02-15

## 2018-12-28 RX ORDER — ONDANSETRON 4 MG/1
4 TABLET, FILM COATED ORAL EVERY 6 HOURS PRN
Qty: 24 TABLET | Refills: 0 | Status: SHIPPED | OUTPATIENT
Start: 2018-12-28 | End: 2019-05-17

## 2018-12-28 RX ADMIN — OXYCODONE HYDROCHLORIDE AND ACETAMINOPHEN 1 TABLET: 7.5; 325 TABLET ORAL at 13:35

## 2018-12-28 RX ADMIN — TIOTROPIUM BROMIDE 1 CAPSULE: 18 CAPSULE ORAL; RESPIRATORY (INHALATION) at 08:43

## 2018-12-28 RX ADMIN — DULOXETINE HYDROCHLORIDE 60 MG: 60 CAPSULE, DELAYED RELEASE ORAL at 10:40

## 2018-12-28 RX ADMIN — DOCUSATE SODIUM 100 MG: 100 CAPSULE, LIQUID FILLED ORAL at 10:40

## 2018-12-28 RX ADMIN — MELOXICAM 15 MG: 15 TABLET ORAL at 08:14

## 2018-12-28 RX ADMIN — BUDESONIDE AND FORMOTEROL FUMARATE DIHYDRATE 2 PUFF: 160; 4.5 AEROSOL RESPIRATORY (INHALATION) at 08:45

## 2018-12-28 RX ADMIN — OXYCODONE HYDROCHLORIDE AND ACETAMINOPHEN 1 TABLET: 7.5; 325 TABLET ORAL at 08:14

## 2018-12-28 RX ADMIN — PANTOPRAZOLE SODIUM 40 MG: 40 TABLET, DELAYED RELEASE ORAL at 06:14

## 2018-12-28 RX ADMIN — POLYETHYLENE GLYCOL 3350 17 G: 17 POWDER, FOR SOLUTION ORAL at 10:40

## 2018-12-28 RX ADMIN — MUPIROCIN 10 APPLICATION: 20 OINTMENT TOPICAL at 08:14

## 2018-12-28 RX ADMIN — ASPIRIN 325 MG: 325 TABLET, DELAYED RELEASE ORAL at 08:13

## 2018-12-28 NOTE — THERAPY TREATMENT NOTE
Acute Care - Physical Therapy Treatment Note  [facility]     Patient Name: Елена Beach  : 1945  MRN: [id number]  Today's Date: 2018             Admit Date: 2018    Visit Dx:    ICD-10-CM ICD-9-CM   1. Arthritis of knee M17.10 716.96     Patient Active Problem List   Diagnosis   • Fibromyalgia   • Arthritis   • Hiatal hernia   • Anxiety and depression   • Asthma   • Hyperlipidemia   • Iron deficiency anemia   • Chronic obstructive pulmonary disease (CMS/HCC)   • Acute URI   • Chronic fatigue   • Snoring   • Dependence on nicotine from cigarettes   • Insomnia   • Osteopenia   • Screening for diabetes mellitus   • Bronchitis   • Arthritis of knee   • Osteoarthritis of knee, unspecified       Therapy Treatment    Rehabilitation Treatment Summary     Row Name 18 0845             Treatment Time/Intention    Discipline  physical therapy assistant  -      Document Type  therapy note (daily note)  -SM      Subjective Information  complains of;pain  -SM      Mode of Treatment  physical therapy  -SM      Patient Effort  good  -SM      Existing Precautions/Restrictions  fall  -SM      Recorded by [SM] Christal Bella, [facility] 18 1037      Row Name 18 0845             Cognitive Assessment/Intervention- PT/OT    Orientation Status (Cognition)  oriented x 4  -SM      Follows Commands (Cognition)  WNL  -SM      Personal Safety Interventions  fall prevention program maintained;gait belt;nonskid shoes/slippers when out of bed  -SM      Recorded by [SM] Christal Bella, [facility] 18 1037      Row Name 18 0845             Bed Mobility Assessment/Treatment    Comment (Bed Mobility)  up in chair  -SM      Recorded by [SM] Christal Bella, [facility] 18 1037      Row Name 18 0845             Transfer Assessment/Treatment    Transfer Assessment/Treatment  sit-stand transfer;stand-sit transfer  -SM      Recorded by [SM] Christal Bella, [facility] 18 1037      Row Name  12/28/18 0845             Sit-Stand Transfer    Sit-Stand [place] (Transfers)  stand by assist  -      Assistive Device (Sit-Stand Transfers)  walker, front-wheeled  -SM      Recorded by [SM] Christal Bella, [facility] 12/28/18 1037      Row Name 12/28/18 0845             Stand-Sit Transfer    Stand-Sit [place] (Transfers)  stand by assist  -      Assistive Device (Stand-Sit Transfers)  walker, front-wheeled  -SM      Recorded by [SM] Christal Bella, [facility] 12/28/18 1037      Row Name 12/28/18 0845             Gait/Stairs Assessment/Training    [place] Level (Gait)  contact guard;stand by assist  -      Assistive Device (Gait)  walker, front-wheeled  -      Distance in Feet (Gait)  120  -SM      Pattern (Gait)  step-through;step-to  -SM      Deviations/Abnormal Patterns (Gait)  antalgic;ana paula decreased;stride length decreased  -SM      Bilateral Gait Deviations  forward flexed posture  -SM      Right Sided Gait Deviations  weight shift ability decreased  -SM      [place] Level (Stairs)  contact guard  -      Handrail Location (Stairs)  both sides  -SM      Number of Steps (Stairs)  4  -SM      Ascending Technique (Stairs)  step-to-step  -SM      Descending Technique (Stairs)  step-to-step  -SM      Recorded by [SM] Christal Bella, [facility] 12/28/18 1037      Row Name 12/28/18 0845             General ROM    GENERAL ROM COMMENTS  R knee 16-83  -SM      Recorded by [SM] Christal Bella, PTA 12/28/18 1037      Row Name 12/28/18 0845             Therapeutic Exercise    Comment (Therapeutic Exercise)  R TKR protocol x 20 reps  -SM      Recorded by [SM] Christal Bella, [facility] 12/28/18 1037      Row Name 12/28/18 0845             Positioning and Restraints    Pre-Treatment Position  sitting in chair/recliner  -SM      Post Treatment Position  chair  -SM      In Chair  reclined;call light within reach;encouraged to call for assist  -      Recorded by [SM] Christal Bella, [facility]  12/28/18 1037      Row Name 12/28/18 0845             Pain Scale: Numbers Pre/Post-Treatment    Pain Scale: Numbers, Pretreatment  3/10  -SM      Pain Scale: Numbers, Post-Treatment  3/10  -SM      Pain Location - Side  Right  -SM      Pain Location  knee  -SM      Pain Intervention(s)  Repositioned;Ambulation/increased activity;Rest  -SM      Recorded by [SM] Christal Bella PTA 12/28/18 1037      Row Name                Wound 12/27/18 0942 Right knee incision    Wound - Properties Group Date first assessed: 12/27/18 [AS] Time first assessed: 0942 [AS] Side: Right [AS] Location: knee [AS] Type: incision [AS] Recorded by:  [AS] Angelita Wallace RN 12/27/18 0942    Row Name                Wound 12/27/18 0959 Right leg incision    Wound - Properties Group Date first assessed: 12/27/18 [AS] Time first assessed: 0959 [AS] Side: Right [AS] Location: leg [AS] Type: incision [AS] Recorded by:  [AS] Angelita Wallace RN 12/27/18 0959      User Key  (r) = Recorded By, (t) = Taken By, (c) = Cosigned By    Initials Name Effective Dates Discipline    AS Angelita Wallace, RN 06/16/16 -  Nurse     Christal Bella, LOIS 03/07/18 -  PT          Wound 12/27/18 0942 Right knee incision (Active)   Dressing Appearance dry;intact;no drainage 12/28/2018  8:13 AM   Closure GRABIEL 12/28/2018  8:13 AM   Base dressing in place, unable to visualize 12/27/2018  4:40 PM   Drainage Amount none 12/28/2018  8:13 AM   Dressing Care, Wound border dressing 12/28/2018  8:13 AM   Wound Output (mL) 10 12/28/2018  8:55 AM       Wound 12/27/18 0959 Right leg incision (Active)   Dressing Appearance dry;intact;no drainage 12/28/2018  4:14 AM           Physical Therapy Education     Title: PT OT SLP Therapies (Done)     Topic: Physical Therapy (Done)     Point: Mobility training (Done)     Learning Progress Summary           Patient Acceptance, E,TB,D, VU by NICOL at 12/28/2018 10:37 AM    Acceptance, E,D, VU,NR by KIRSTIE at 12/27/2018  3:21 PM                    Point: Home exercise program (Done)     Learning Progress Summary           Patient Acceptance, E,TB,D, VU by  at 12/28/2018 10:37 AM    Acceptance, E,D, VU,NR by  at 12/27/2018  3:21 PM                   Point: Body mechanics (Done)     Learning Progress Summary           Patient Acceptance, E,TB,D, VU by  at 12/28/2018 10:37 AM    Acceptance, E,D, VU,NR by  at 12/27/2018  3:21 PM                   Point: Precautions (Done)     Learning Progress Summary           Patient Acceptance, E,TB,D, VU by  at 12/28/2018 10:37 AM    Acceptance, E,D, VU,NR by  at 12/27/2018  3:21 PM                               User Key     Initials Effective Dates Name Provider Type Discipline     06/08/18 -  Diann Ye, PT Physical Therapist PT     03/07/18 -  Christal Bella, PTA Physical Therapy Assistant PT                PT Recommendation and Plan     Plan of Care Reviewed With: patient  Progress: improving  Outcome Summary: Pt progressing w/ R knee ROM and strength. Ambulated 120ft w/ Rw and CGA-SBA. R knee ROM 16-83.  Outcome Measures     Row Name 12/28/18 1000 12/27/18 1500          How much help from another person do you currently need...    Turning from your back to your side while in flat bed without using bedrails?  3  -  3  -KH     Moving from lying on back to sitting on the side of a flat bed without bedrails?  3  -  3  -KH     Moving to and from a bed to a chair (including a wheelchair)?  3  -  3  -KH     Standing up from a chair using your arms (e.g., wheelchair, bedside chair)?  3  -  3  -KH     Climbing 3-5 steps with a railing?  3  -  2  -KH     To walk in hospital room?  3  -  3  -KH     AM-PAC 6 Clicks Score  18  -  17  -KH        Functional Assessment    Outcome Measure Options  AM-PAC 6 Clicks Basic Mobility (PT)  -  AM-PAC 6 Clicks Basic Mobility (PT)  -       User Key  (r) = Recorded By, (t) = Taken By, (c) = Cosigned By    Initials Name Provider Type      Diann Ye, PT Physical Therapist     Christal Bella, LOIS Physical Therapy Assistant         Time Calculation:   PT Charges     Row Name 12/28/18 1039             Time Calculation    Start Time  0845  -SM      Stop Time  0930  -      Time Calculation (min)  45 min  -      PT Received On  12/28/18  -        User Key  (r) = Recorded By, (t) = Taken By, (c) = Cosigned By    Initials Name Provider Type     Christal Bella PTA Physical Therapy Assistant        Therapy Suggested Charges     Code   Minutes Charges    None           Therapy Charges for Today     Code Description Service Date Service Provider Modifiers Qty    05343161816 HC PT THER PROC EA 15 MIN 12/28/2018 Christal Bella PTA GP 1    44033839648 HC PT THER PROC GROUP 12/28/2018 Christal Bella PTA GP 1          PT G-Codes  Outcome Measure Options: AM-PAC 6 Clicks Basic Mobility (PT)  AM-PAC 6 Clicks Score: 18    Christal Bella PTA  12/28/2018

## 2018-12-28 NOTE — DISCHARGE SUMMARY
Date of Admission:  12/27/2018    Date of Discharge:  12/28/2018    Discharge Diagnosis: s/p Right total knee arthroplasty    Admitting Physician: Gerry Estevez    Consults: none    DETAILS OF HOSPITAL STAY:  Patient is a 73 y.o. female was admitted to the floor following a total knee arthroplasty.  Post-operatively the patient was transferred to the post-operative floor where the patient underwent physical therapy that included active as well as passive ROM exercises. Opioids were titrated to achieve appropriate pain management to allow for participation in mobilization exercises. Vital signs are now stable. The incision is benign without signs or symptoms of infection. Operative extremity neurovascular status remains intact. Appropriate education re: incision care, activity levels, medications, and follow up visits was completed and all questions were answered. The patient is now deemed stable for discharge to home.    Condition on Discharge:  Stable    Discharge Medications     Discharge Medications      New Medications      Instructions Start Date   aspirin 325 MG EC tablet   325 mg, Oral, Daily      docusate sodium 100 MG capsule   100 mg, Oral, 2 Times Daily      ondansetron 4 MG tablet  Commonly known as:  ZOFRAN   4 mg, Oral, Every 6 Hours PRN      oxyCODONE-acetaminophen 7.5-325 MG per tablet  Commonly known as:  PERCOCET   1 tablet, Oral, Every 4 Hours PRN         Continue These Medications      Instructions Start Date   albuterol sulfate  (90 Base) MCG/ACT inhaler  Commonly known as:  PROVENTIL HFA;VENTOLIN HFA;PROAIR HFA   2 puffs, Inhalation, Every 4 Hours PRN      atorvastatin 10 MG tablet  Commonly known as:  LIPITOR   10 mg, Oral, Daily      budesonide-formoterol 160-4.5 MCG/ACT inhaler  Commonly known as:  SYMBICORT   2 puffs, Inhalation, 2 Times Daily - RT      CALCIUM CITRATE + D PO   1 tablet, Oral, Daily      DULoxetine 60 MG capsule  Commonly known as:  CYMBALTA   60 mg, Oral, Daily       fluticasone 50 MCG/ACT nasal spray  Commonly known as:  FLONASE   2 sprays, Nasal, Daily      IRON 27 PO  Notes to patient:  Continue this medication as previously ordered   1 tablet, Oral, Every Other Day      montelukast 10 MG tablet  Commonly known as:  SINGULAIR   10 mg, Oral, Nightly      MUCINEX PO   600 mg, Oral, 2 Times Daily      MULTIVITAMIN ADULT PO   1 tablet, Oral, Daily      NON FORMULARY   1 capsule, 2 Times Daily, instaflex       omeprazole 40 MG capsule  Commonly known as:  priLOSEC   40 mg, Oral, Every Morning      sodium chloride 0.65 % nasal spray  Commonly known as:  OCEAN   1 spray, Nasal, As Needed      tiotropium 18 MCG per inhalation capsule  Commonly known as:  SPIRIVA   1 capsule, Inhalation, Daily - RT         Stop These Medications    acetaminophen 500 MG tablet  Commonly known as:  TYLENOL     Chlorhexidine Gluconate Cloth 2 % pads     mupirocin 2 % nasal ointment  Commonly known as:  BACTROBAN            Discharge Diet: regular    Activity at Discharge: as tolerated    Discharge Instructions:   1)  Patient is to continue with physical therapy exercises daily and continue working with the physical therapist as ordered.  2)  Continue to follow precautions as instructed.   3)  Patient may weight bear as tolerated.   4)  Continue to ice regularly. Patient instructed on frequent calf pumping exercises.  Patient also instructed on incentive spirometer during hospitalization and encouraged to continue to use at home regularly.   5)  Patient is instructed to continue DVT prophylaxis.  6)  The dressing should be left in place. If waterproof dressing is intact the patient may shower immediately following discharge. If the dressing becomes disloged or saturated it should be changed and patient must wait until POD #5 to shower. If dressing is changed, apply dry sterile dressing after showering.  7)  Follow up appointment in 2 weeks - patient to call the office at [phone] to schedule.      Follow-up Appointments  2 weeks with Dr Zenaida Israel, [facility]  12/28/18  11:28 AM

## 2018-12-28 NOTE — PLAN OF CARE
Problem: Patient Care Overview  Goal: Plan of Care Review  Outcome: Outcome(s) achieved Date Met: 12/28/18 12/28/18 1425   OTHER   Outcome Summary Patient ambulating using walker and stand by assist. Vitals are stable and voiding function is intact. Pain is controlled with po meds. Drain removed and cristal dressing changed d/t leak unable to be sealed after drain removal. Patient educated on importance of IS post op with hx of COPD and asthma. Patient is prepared and ready for d/c home with hh.    Coping/Psychosocial   Plan of Care Reviewed With patient   Plan of Care Review   Progress improving       Problem: Fall Risk (Adult)  Goal: Absence of Fall  Outcome: Outcome(s) achieved Date Met: 12/28/18 12/28/18 1425   Fall Risk (Adult)   Absence of Fall achieves outcome       Problem: Knee Arthroplasty (Total, Partial) (Adult)  Goal: Signs and Symptoms of Listed Potential Problems Will be Absent, Minimized or Managed (Knee Arthroplasty)  Outcome: Outcome(s) achieved Date Met: 12/28/18 12/28/18 1425   Goal/Outcome Evaluation   Problems Assessed (Knee Arthroplasty) all   Problems Present (Knee Arthroplasty) functional deficit;pain;range of motion decreased     Goal: Anesthesia/Sedation Recovery  Outcome: Outcome(s) achieved Date Met: 12/28/18 12/28/18 1425   Goal/Outcome Evaluation   Anesthesia/Sedation Recovery recovered to baseline

## 2018-12-28 NOTE — PLAN OF CARE
Problem: Patient Care Overview  Goal: Plan of Care Review  Outcome: Ongoing (interventions implemented as appropriate)   12/28/18 0319   OTHER   Outcome Summary Asthma treated with meds. pain well controlled. pt amb well.    Coping/Psychosocial   Plan of Care Reviewed With patient   Plan of Care Review   Progress improving       Problem: Fall Risk (Adult)  Goal: Absence of Fall  Outcome: Ongoing (interventions implemented as appropriate)   12/28/18 0319   Fall Risk (Adult)   Absence of Fall making progress toward outcome

## 2018-12-28 NOTE — PROGRESS NOTES
Discharge Planning Assessment  [facility]     Patient Name: Елена Beach  MRN: [id number]  Today's Date: 12/28/2018    Admit Date: 12/27/2018    Discharge Needs Assessment     Row Name 12/28/18 1644       Living Environment    Lives With  alone    Current Living Arrangements  home/apartment/condo    Family Caregiver if Needed  significant other    Able to Return to Prior Arrangements  yes       Transition Planning    Patient/Family Anticipated Services at Transition  home health care    Transportation Anticipated  family or friend will provide       Discharge Needs Assessment    Readmission Within the Last 30 Days  no previous admission in last 30 days    Concerns to be Addressed  denies needs/concerns at this time        Discharge Plan     Row Name 12/28/18 1646       Plan    Plan  Home w/ SO and [facility]     Patient/Family in Agreement with Plan  yes    Plan Comments  Facesheet and d/c plan verified, pt plans to d/c home w/ her SO. She requested [facility]. Beatriz/[facility] notified.     Final Discharge Disposition Code  06 - home with home health care        Destination      No service coordination in this encounter.      Durable Medical Equipment      No service coordination in this encounter.      Dialysis/Infusion      No service coordination in this encounter.      Home Medical Care - Selection Complete      Service Provider Request Status Selected Services Address Phone Number Fax Number    [facility] [place] [facility] [address] [address] [phone]      Community Resources      No service coordination in this encounter.        Expected Discharge Date and Time     Expected Discharge Date Expected Discharge Time    Dec 28, 2018         Demographic Summary    No documentation.       Functional Status    No documentation.       Psychosocial    No documentation.       Abuse/Neglect    No documentation.       Legal    No documentation.        Substance Abuse    No documentation.       Patient Forms     Row Name 12/28/18 7798       Patient Forms    Provider Choice List  Delivered    Delivered to  Patient    Method of delivery  Telephone            Daphnie Ramos RN

## 2018-12-28 NOTE — PLAN OF CARE
Problem: Patient Care Overview  Goal: Plan of Care Review  Outcome: Ongoing (interventions implemented as appropriate)   12/28/18 1037   OTHER   Outcome Summary Pt progressing w/ R knee ROM and strength. Ambulated 120ft w/ Rw and CGA-SBA. R knee ROM 16-83.   Coping/Psychosocial   Plan of Care Reviewed With patient   Plan of Care Review   Progress improving

## 2019-01-11 ENCOUNTER — OFFICE VISIT (OUTPATIENT)
Dept: ORTHOPEDIC SURGERY | Facility: CLINIC | Age: 74
End: 2019-01-11

## 2019-01-11 VITALS
BODY MASS INDEX: 20.24 KG/M2 | WEIGHT: 110 LBS | TEMPERATURE: 97.4 F | HEIGHT: 62 IN | SYSTOLIC BLOOD PRESSURE: 100 MMHG | DIASTOLIC BLOOD PRESSURE: 60 MMHG

## 2019-01-11 DIAGNOSIS — Z96.651 S/P TOTAL KNEE ARTHROPLASTY, RIGHT: Primary | ICD-10-CM

## 2019-01-11 PROCEDURE — 99024 POSTOP FOLLOW-UP VISIT: CPT | Performed by: ORTHOPAEDIC SURGERY

## 2019-01-11 PROCEDURE — 73560 X-RAY EXAM OF KNEE 1 OR 2: CPT | Performed by: ORTHOPAEDIC SURGERY

## 2019-01-11 RX ORDER — HYDROCODONE BITARTRATE AND ACETAMINOPHEN 5; 325 MG/1; MG/1
1 TABLET ORAL EVERY 4 HOURS PRN
Qty: 50 TABLET | Refills: 0 | Status: SHIPPED | OUTPATIENT
Start: 2019-01-11 | End: 2019-02-15

## 2019-01-11 NOTE — PROGRESS NOTES
Елена Beach     : 1945     MRN: [id number]     DATE: 2019    DIAGNOSIS:  2 week follow up right TKA    SUBJECTIVE:  Patient returns today for 2 week follow up of right total knee replacement. Patient reports doing well with no unusual complaints.     OBJECTIVE:     Exam: The incision is healing appropriately.  No sign of infection.  Range of motion is progressing as expected.  The calf is soft and nontender with a negative Homans sign.    DIAGNOSTIC STUDIES     Xrays: 2 views of the right knee (AP and lateral) were ordered and reviewed for evaluation of recent knee replacement. They demonstrate a well positioned, well aligned knee replacement without complicating factors noted. In comparison with previous films there has been interval implant placement.    ASSESSMENT: 2 week status post right knee replacement.    PLAN:   1) Order given for PT   2) Discontinue ARA hose   3) Continue ice PRN   4) Continue DVT prophylaxis.   5) Follow up in 4 weeks with repeat 3 view x-rays    Gerry Estevez MD  2019

## 2019-01-14 ENCOUNTER — HOSPITAL ENCOUNTER (OUTPATIENT)
Dept: PHYSICAL THERAPY | Facility: HOSPITAL | Age: 74
Setting detail: THERAPIES SERIES
Discharge: HOME | End: 2019-01-14

## 2019-01-14 DIAGNOSIS — R26.89 IMPAIRED GAIT AND MOBILITY: ICD-10-CM

## 2019-01-14 DIAGNOSIS — M25.561 ACUTE PAIN OF RIGHT KNEE: Primary | ICD-10-CM

## 2019-01-14 DIAGNOSIS — Z47.89 ORTHOPEDIC AFTERCARE: ICD-10-CM

## 2019-01-14 DIAGNOSIS — Z96.651 STATUS POST RIGHT KNEE REPLACEMENT: ICD-10-CM

## 2019-01-14 PROCEDURE — 97161 PT EVAL LOW COMPLEX 20 MIN: CPT | Performed by: PHYSICAL THERAPIST

## 2019-01-14 PROCEDURE — 97110 THERAPEUTIC EXERCISES: CPT | Performed by: PHYSICAL THERAPIST

## 2019-01-15 NOTE — THERAPY EVALUATION
Outpatient Physical Therapy Ortho Initial Evaluation  [facility]     Patient Name: Елена Beach  : 1945  MRN: [id number]  Today's Date: 2019      Visit Date: 2019    Patient Active Problem List   Diagnosis   • Fibromyalgia   • Arthritis   • Hiatal hernia   • Anxiety and depression   • Asthma   • Hyperlipidemia   • Iron deficiency anemia   • Chronic obstructive pulmonary disease (CMS/HCC)   • Acute URI   • Chronic fatigue   • Snoring   • Dependence on nicotine from cigarettes   • Insomnia   • Osteopenia   • Screening for diabetes mellitus   • Bronchitis   • Arthritis of knee   • Osteoarthritis of knee, unspecified        Past Medical History:   Diagnosis Date   • Anemia    • Arthritis    • Asthma    • Back pain    • Bursitis    • COPD (chronic obstructive pulmonary disease) (CMS/HCC)    • Fibromyalgia    • Fibromyositis    • GERD (gastroesophageal reflux disease)    • Hiatal hernia    • History of pneumonia    • Hyperlipidemia    • Knee pain    • Osteoarthritis    • Osteoporosis    • PONV (postoperative nausea and vomiting)    • Scoliosis         Past Surgical History:   Procedure Laterality Date   • COLONOSCOPY     • ENDOSCOPY     • FOOT SURGERY Right    • HAND SURGERY     • SHOULDER ARTHROSCOPY     • SHOULDER SURGERY Left    • TOE SURGERY Right    • TOOTH EXTRACTION     • TOTAL KNEE ARTHROPLASTY Right 2018    Procedure: TOTAL KNEE ARTHROPLASTY;  Surgeon: Gerry Estevez MD;  Location: [facility] OR;  Service: Orthopedics   • TUBAL ABDOMINAL LIGATION     • WRIST SURGERY         Visit Dx:     ICD-10-CM ICD-9-CM   1. Acute pain of right knee M25.561 719.46   2. Status post right knee replacement Z96.651 V43.65   3. Orthopedic aftercare Z47.89 V54.9   4. Impaired gait and mobility R26.89 781.2       Patient History     Row Name 19 1300             History    Chief Complaint  Pain;Difficulty Walking;Difficulty with daily activities;Swelling  -RA       Type of Pain  Knee pain  -RA      Date Current Problem(s) Began  12/27/18  -RA      Brief Description of Current Complaint  Patient with hx of chronic R knee pain now s/p R TKA (12/27/18).  She had HHPT until 1/10/19 and now presents for OP PT.   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  ice, Tylenol, pain meds  -RA      What clinical tests have you had for this problem?  X-ray  -RA      Results of Clinical Tests  end stage OA   -RA      Related/Recent Hospitalizations  Yes  -RA      Date of Hospitalization  12/27/18  -RA      Surgery/Hospitalization  R TKA   -RA         Pain     Pain Location  Knee  -RA      Pain at Present  2  -RA      Pain at Best  2  -RA      Pain at Worst  5  -RA      Pain Frequency  Constant/continuous;Intermittent  -RA      Pain Description  Aching;Shooting  -RA      What Performance Factors Make the Current Problem(s) WORSE?  WB activity, extended sitting/lying  -RA      What Performance Factors Make the Current Problem(s) BETTER?  elevating leg, changing position   -RA      Is your sleep disturbed?  Yes always has, not necessarily due to knee  -RA      Is medication used to assist with sleep?  Yes usually  -RA      What position do you sleep in?  -- has been changing depending on what's comfortable   -RA      Difficulties with ADL's?  pain and difficulty with daily activities  -RA      Difficulties with recreational activities?  limited with community mobility/activities   -RA         Fall Risk Assessment    Any falls in the past year:  No  -RA         Services    Prior Rehab/Home Health Experiences  Yes  -RA      When was the prior experience with Rehab/Home  Health  posts op until 1/10/19  -RA      Where was the prior experience with Rehab/Home Health  HHPT  -RA      Are you currently receiving Home Health services  No  -RA         Daily Activities    Primary Language  English  -RA      Are you able to read  Yes  -RA      Are you able to write  Yes  -RA      Teaching needs identified  Home Exercise Program;Management of Condition  -RA      Patient is concerned about/has problems with  Standing;Transfers (getting out of a chair, bed);Walking;Climbing Stairs;Difficulty with self care (i.e. bathing, dressing, toileting:;Performing home management (household chores, shopping, care of dependents);Performing job responsibilities/community activities (work, school,  -RA      Explanation of Functional Status Problem  independent with pain and/or modification   -RA      Pt Participated in POC and Goals  Yes  -RA         Safety    Are you being hurt, hit, or frightened by anyone at home or in your life?  No  -RA      Are you being neglected by a caregiver  No  -RA        User Key  (r) = Recorded By, (t) = Taken By, (c) = Cosigned By    Initials Name Provider Type    RA Marie Bhatt, PT Physical Therapist          PT Ortho     Row Name 01/14/19 1400       Special Tests/Palpation    Special Tests/Palpation  --  -RA       Knee Palpation    Knee Palpation?  --  -RA       Patellar Accessory Motions    Patellar Accessory Motions Tested?  --  -RA    Row Name 01/14/19 1300       Precautions and Contraindications    Precautions/Limitations  -- post op TKR   -RA       Subjective Pain    Subjective Pain Comment  Initial Evaluation   -RA       Posture/Observations    Posture/Observations Comments  FH/rounded shoulder, antalgic/compensated gait using SPC  -RA       Myotomal Screen- Lower Quarter Clearing    Hip flexion (L2)  Right:;4 (Good);Left:;4+ (Good +)  -RA    Knee extension (L3)  Right:;3+ (Fair +);4- (Good -);Left:;4+ (Good +) R in available ROM with pain   -RA    Ankle DF (L4)   Bilateral:;4 (Good)  -RA    Ankle PF (S1)  Bilateral:;4- (Good -);4 (Good)  -RA    Knee flexion (S2)  Right:;4- (Good -);Left:;4+ (Good +) R in available ROM with pain   -RA       Special Tests/Palpation    Special Tests/Palpation  Knee  -RA       Knee Palpation    Knee Palpation?  Yes global tenderness R knee, quad/HS tissues  -RA       Patellar Accessory Motions    Patellar Accessory Motions Tested?  Yes  -RA       General ROM    GENERAL ROM COMMENTS  R knee A/PROM 25/12-80/88 degrees, L knee AROM 3 to 119 degrees (sitting)  -RA       MMT (Manual Muscle Testing)    General MMT Comments  hip strength grossly 4-/5 to 4/5 (R slightly weaker than L)  -RA       Flexibility    Flexibility Tested?  Lower Extremity  -RA       Lower Extremity Flexibility    LE Flexibility Comments  tightness of HS, calf tisues  -RA       Pathomechanics    Lower Extremity Pathomechanics  Antalgic with midstance;Limited knees flexion with swing through;Asymmetrical steps  -RA       Transfers    Comment (Transfers)  uses UE push for STS, reduced R LE WB 2/2 pain and limited motion (R knee somewhat extended)  -RA       Gait/Stairs Assessment/Training    Assistive Device (Gait)  cane, straight  -RA    Deviations/Abnormal Patterns (Gait)  antalgic  -RA    Bilateral Gait Deviations  weight shift ability decreased;heel strike decreased  -RA    Comment (Gait/Stairs)  NR for stairs  -RA      User Key  (r) = Recorded By, (t) = Taken By, (c) = Cosigned By    Initials Name Provider Type    Marie Cassidy, PT Physical Therapist                      Therapy Education  Given: HEP, Symptoms/condition management, Posture/body mechanics, Pain management, Mobility training  Program: New  How Provided: Verbal, Demonstration  Provided to: Patient  Level of Understanding: Teach back education performed, Verbalized     PT OP Goals     Row Name 01/14/19 1300          PT Short Term Goals    STG 1  Patient will be independent with initial HEP for  ROM/flexibility without increased symptoms  -RA     STG 2  Patient will be independent with education for symptom management, joint protection, and strategies to minimize stress on affected tissues.  -RA     STG 3  Patient will have R knee flexion AROM >/= 105 degrees for increased ease/tolerance with ADL's/transitional movements  -RA     STG 4  Patient will have R knee extension ROM </= 5 degrees for normalization of heel to toe gait mechanics  -RA        Long Term Goals    LTG 1  Patient will be independent with established HEP for strength/stabilization to facilitate self management of condition  -RA     LTG 2  Patient will demonstrate nearly normal gait without AD to facilitate return to community activities  -RA     LTG 3  Patient will have hip/knee strength 4/5 to 4+/5 for increased ease/tolerance with prolonged WB activity   -RA     LTG 4  Patient will be able to go up/down stairs reciprocally with </= 1HR support and minimal pain/compensation for greater ease with community mobility.  -RA     LTG 5  Patient will have improved score on KOS Activities of Daily Living Scale from39/80 to >/= 53/80 demonstrating reduced functional limitations.   -RA       User Key  (r) = Recorded By, (t) = Taken By, (c) = Cosigned By    Initials Name Provider Type    RA Marie Bhatt, PT Physical Therapist          PT Assessment/Plan     Row Name 01/14/19 4866          PT Assessment    Functional Limitations  Impaired gait;Limitations in community activities;Performance in leisure activities;Limitation in home management;Performance in self-care ADL;Impaired locomotion  -RA     Impairments  Pain;Muscle strength;Posture;Impaired flexibility;Edema;Gait;Balance;Endurance  -RA     Assessment Comments  Ms Beach is a 74yo presenting to therapy with stable condition s/p R TKA on 12/27/18. She has comorbidities/personal factors including arthritis, Fibromyalgia, COPD, and anxiety/depression that may impact her therapy plan of care.  She is independent but slow and limited by pain with ADL's/household tasks. She is not currently able to drive or participate in community/leisure activities. She demonstrates antalgic/compensated gait using SPC, post op swelling R knee, impaired R knee ROM/strength, decreased core/hip strength, tightness of HS/calf tissues, global tenderness R knee/quad/HS, and limited functional activity tolerance. She would benefit from skilled therapy for R TKA rehab for education, ROM/flexibility, strength/stabilization, and manual/modalities prn to help reduce pain, improve function, and facilitate self-management of symptoms/condition with return to desired activities.     -RA     Please refer to paper survey for additional self-reported information  Yes  -RA     Rehab Potential  Good  -RA     Patient/caregiver participated in establishment of treatment plan and goals  Yes  -RA     Patient would benefit from skilled therapy intervention  Yes  -RA        PT Plan    PT Frequency  2x/week;3x/week  -RA     Planned CPT's?  PT EVAL LOW COMPLEXITY: 74532;PT MANUAL THERAPY EA 15 MIN: 81207;PT NEUROMUSC RE-EDUCATION EA 15 MIN: 40552;PT THER ACT EA 15 MIN: 80511;PT THER PROC EA 15 MIN: 28567;PT GAIT TRAINING EA 15 MIN: 20452;PT SELF CARE/HOME MGMT/TRAIN EA 15: 82449;PT HOT OR COLD PACK TREAT MCARE;PT AQUATIC THERAPY EA 15 MIN: 51485  -RA     PT Plan Comments  Skilled PT for R TKA post op rehab to help reduce pain, restore function, and facilitate return to desired activities  -RA       User Key  (r) = Recorded By, (t) = Taken By, (c) = Cosigned By    Initials Name Provider Type    Marie Cassidy, PT Physical Therapist          Modalities     Row Name 01/14/19 1300             Ice    Ice Applied  Yes  -RA      Location  R knee  -RA      Rx Minutes  10 mins  -RA      Ice S/P Rx  Yes  -RA        User Key  (r) = Recorded By, (t) = Taken By, (c) = Cosigned By    Initials Name Provider Type    Marie Cassidy, PT Physical Therapist         Exercises     Row Name 01/14/19 1300             Subjective Pain    Subjective Pain Comment  Initial Evaluation   -RA         Total Minutes    81215 - PT Therapeutic Exercise Minutes  28  -RA         Exercise 1    Exercise Name 1  QS   -RA      Reps 1  10  -RA         Exercise 2    Exercise Name 2  LAQ/SAQ  -RA      Reps 2  10  -RA         Exercise 3    Exercise Name 3  heel slides  -RA      Reps 3  10  -RA         Exercise 4    Exercise Name 4  HR  -RA      Reps 4  10  -RA         Exercise 5    Exercise Name 5  HS curls  -RA      Reps 5  10  -RA         Exercise 6    Exercise Name 6  mini squats  -RA      Reps 6  10  -RA         Exercise 7    Exercise Name 7  standing hip abd  -RA      Reps 7  10  -RA         Exercise 8    Exercise Name 8  knee flexion stretch on stairs   -RA      Reps 8  3  -RA      Time 8  20s  -RA         Exercise 9    Exercise Name 9  NuStep UE/LE  - L4   -RA      Time 9  5 min  -RA        User Key  (r) = Recorded By, (t) = Taken By, (c) = Cosigned By    Initials Name Provider Type    Marie Cassidy, PT Physical Therapist                        Outcome Measure Options: Knee Outcome Score- ADL  Knee Outcome Score  Knee Outcome Score Comments: 39/80 (where 80/80 = no disability)      Time Calculation:     Therapy Suggested Charges     Code   Minutes Charges    74095 (CPT®) Hc Pt Neuromusc Re Education Ea 15 Min      68987 (CPT®) Hc Pt Ther Proc Ea 15 Min 28 2    15228 (CPT®) Hc Gait Training Ea 15 Min      87354 (CPT®) Hc Pt Therapeutic Act Ea 15 Min      09898 (CPT®) Hc Pt Manual Therapy Ea 15 Min      18709 (CPT®) Hc Pt Ther Massage- Per 15 Min      30583 (CPT®) Hc Pt Iontophoresis Ea 15 Min      67573 (CPT®) Hc Pt Elec Stim Ea-Per 15 Min      83214 (CPT®) Hc Pt Ultrasound Ea 15 Min      67605 (CPT®) Hc Pt Self Care/Mgmt/Train Ea 15 Min      77483 (CPT®) Hc Pt Prosthetic (S) Train Initial Encounter, Each 15 Min      83387 (CPT®) Hc Orthotic(S) Mgmt/Train Initial Encounter, Each  15min      92287 (CPT®) Hc Pt Aquatic Therapy Ea 15 Min      18965 (CPT®) Hc Pt Orthotic(S)/Prosthetic(S) Encounter, Each 15 Min       (CPT®) Hc Pt Electrical Stim Unattended      Total  28 2          Start Time: 1345  Stop Time: 1453  Time Calculation (min): 68 min     Therapy Charges for Today     Code Description Service Date Service Provider Modifiers Qty    58360978281 HC PT THER PROC EA 15 MIN 1/14/2019 Marie Bhatt, PT GP 2    51993314826 HC PT HOT OR COLD PACK TREAT MCARE 1/14/2019 Marie Bhatt, PT GP 1    08720400474 HC PT EVAL LOW COMPLEXITY 2 1/14/2019 Marie Bhatt, PT GP 1          PT G-Codes  Outcome Measure Options: Knee Outcome Score- ADL         Marie Bhatt, PT  1/14/2019

## 2019-01-16 ENCOUNTER — HOSPITAL ENCOUNTER (OUTPATIENT)
Dept: PHYSICAL THERAPY | Facility: HOSPITAL | Age: 74
Setting detail: THERAPIES SERIES
Discharge: HOME | End: 2019-01-16
Attending: ORTHOPAEDIC SURGERY

## 2019-01-16 DIAGNOSIS — Z96.651 STATUS POST RIGHT KNEE REPLACEMENT: Primary | ICD-10-CM

## 2019-01-16 DIAGNOSIS — Z47.89 ORTHOPEDIC AFTERCARE: ICD-10-CM

## 2019-01-16 DIAGNOSIS — G89.29 CHRONIC PAIN OF RIGHT KNEE: ICD-10-CM

## 2019-01-16 DIAGNOSIS — R26.89 IMPAIRED GAIT AND MOBILITY: ICD-10-CM

## 2019-01-16 DIAGNOSIS — M25.561 ACUTE PAIN OF RIGHT KNEE: ICD-10-CM

## 2019-01-16 DIAGNOSIS — M25.561 CHRONIC PAIN OF RIGHT KNEE: ICD-10-CM

## 2019-01-16 PROCEDURE — 97110 THERAPEUTIC EXERCISES: CPT

## 2019-01-16 NOTE — THERAPY TREATMENT NOTE
Outpatient Physical Therapy Ortho Treatment Note  [facility]     Patient Name: Елена Beach  : 1945  MRN: [id number]  Today's Date: 2019      Visit Date: 2019    Visit Dx:    ICD-10-CM ICD-9-CM   1. Status post right knee replacement Z96.651 V43.65   2. Acute pain of right knee M25.561 719.46   3. Orthopedic aftercare Z47.89 V54.9   4. Impaired gait and mobility R26.89 781.2   5. Chronic pain of right knee M25.561 719.46    G89.29 338.29       Patient Active Problem List   Diagnosis   • Fibromyalgia   • Arthritis   • Hiatal hernia   • Anxiety and depression   • Asthma   • Hyperlipidemia   • Iron deficiency anemia   • Chronic obstructive pulmonary disease (CMS/HCC)   • Acute URI   • Chronic fatigue   • Snoring   • Dependence on nicotine from cigarettes   • Insomnia   • Osteopenia   • Screening for diabetes mellitus   • Bronchitis   • Arthritis of knee   • Osteoarthritis of knee, unspecified        Past Medical History:   Diagnosis Date   • Anemia    • Arthritis    • Asthma    • Back pain    • Bursitis    • COPD (chronic obstructive pulmonary disease) (CMS/HCC)    • Fibromyalgia    • Fibromyositis    • GERD (gastroesophageal reflux disease)    • Hiatal hernia    • History of pneumonia    • Hyperlipidemia    • Knee pain    • Osteoarthritis    • Osteoporosis    • PONV (postoperative nausea and vomiting)    • Scoliosis         Past Surgical History:   Procedure Laterality Date   • COLONOSCOPY     • ENDOSCOPY     • FOOT SURGERY Right 2006   • HAND SURGERY     • SHOULDER ARTHROSCOPY     • SHOULDER SURGERY Left    • TOE SURGERY Right    • TOOTH EXTRACTION     • TOTAL KNEE ARTHROPLASTY Right 2018    Procedure: TOTAL KNEE ARTHROPLASTY;  Surgeon: Gerry Estevez MD;  Location: [facility] OR;  Service: Orthopedics   • TUBAL ABDOMINAL LIGATION     • WRIST SURGERY         PT Ortho     Row Name 19 1400       Special Tests/Palpation    Special Tests/Palpation  --   -RA       Knee Palpation    Knee Palpation?  --  -RA       Patellar Accessory Motions    Patellar Accessory Motions Tested?  --  -RA    Row Name 01/14/19 1300       Precautions and Contraindications    Precautions/Limitations  -- post op TKR   -RA       Subjective Pain    Subjective Pain Comment  Initial Evaluation   -RA       Posture/Observations    Posture/Observations Comments  FH/rounded shoulder, antalgic/compensated gait using SPC  -RA       Myotomal Screen- Lower Quarter Clearing    Hip flexion (L2)  Right:;4 (Good);Left:;4+ (Good +)  -RA    Knee extension (L3)  Right:;3+ (Fair +);4- (Good -);Left:;4+ (Good +) R in available ROM with pain   -RA    Ankle DF (L4)  Bilateral:;4 (Good)  -RA    Ankle PF (S1)  Bilateral:;4- (Good -);4 (Good)  -RA    Knee flexion (S2)  Right:;4- (Good -);Left:;4+ (Good +) R in available ROM with pain   -RA       Special Tests/Palpation    Special Tests/Palpation  Knee  -RA       Knee Palpation    Knee Palpation?  Yes global tenderness R knee, quad/HS tissues  -RA       Patellar Accessory Motions    Patellar Accessory Motions Tested?  Yes  -RA       General ROM    GENERAL ROM COMMENTS  R knee A/PROM 25/12-80/88 degrees, L knee AROM 3 to 119 degrees (sitting)  -RA       MMT (Manual Muscle Testing)    General MMT Comments  hip strength grossly 4-/5 to 4/5 (R slightly weaker than L)  -RA       Flexibility    Flexibility Tested?  Lower Extremity  -RA       Lower Extremity Flexibility    LE Flexibility Comments  tightness of HS, calf tisues  -RA       Pathomechanics    Lower Extremity Pathomechanics  Antalgic with midstance;Limited knees flexion with swing through;Asymmetrical steps  -RA       Transfers    Comment (Transfers)  uses UE push for STS, reduced R LE WB 2/2 pain and limited motion (R knee somewhat extended)  -RA       Gait/Stairs Assessment/Training    Assistive Device (Gait)  cane, straight  -RA    Deviations/Abnormal Patterns (Gait)  antalgic  -RA    Bilateral Gait Deviations   weight shift ability decreased;heel strike decreased  -RA    Comment (Gait/Stairs)  NR for stairs  -RA      User Key  (r) = Recorded By, (t) = Taken By, (c) = Cosigned By    Initials Name Provider Type    Marie Cassidy, PT Physical Therapist                      PT Assessment/Plan     Row Name 01/16/19 1357          PT Assessment    Assessment Comments  Passive flex 100. Added weight to several exercises  -WS       User Key  (r) = Recorded By, (t) = Taken By, (c) = Cosigned By    Initials Name Provider Type    Chino Johnson PTA Physical Therapy Assistant          Modalities     Row Name 01/16/19 1300             Ice    Ice Applied  Yes ice massage followed by ice wrap  -WS      Location  R knee  -WS      Rx Minutes  10 mins  -WS      Ice Prior to Rx  Yes  -WS        User Key  (r) = Recorded By, (t) = Taken By, (c) = Cosigned By    Initials Name Provider Type    Chino Johnson PTA Physical Therapy Assistant          Exercises     Row Name 01/16/19 1300             Subjective Comments    Subjective Comments  Slept in bed last night  -WS         Subjective Pain    Able to rate subjective pain?  yes  -WS      Pre-Treatment Pain Level  4  -WS         Total Minutes    35630 - PT Therapeutic Exercise Minutes  35  -WS         Exercise 1    Exercise Name 1  QS   -WS      Reps 1  15  -WS      Additional Comments  towel  -WS         Exercise 2    Exercise Name 2  SAQ  -WS      Reps 2  15  -WS      Additional Comments  2#  -WS         Exercise 3    Exercise Name 3  heel slides  -WS      Reps 3  --  -WS         Exercise 4    Exercise Name 4  HR  -WS      Reps 4  20  -WS         Exercise 5    Exercise Name 5  HS curls  -WS      Reps 5  10  -WS      Additional Comments  2#  -WS         Exercise 6    Exercise Name 6  mini squats  -WS      Reps 6  10  -WS         Exercise 7    Exercise Name 7  standing hip abd  -WS      Reps 7  20  -WS      Additional Comments  2#  -WS         Exercise 8    Exercise Name 8   knee flexion stretch on stairs   -WS      Reps 8  3  -WS      Time 8  20s  -WS         Exercise 9    Exercise Name 9  NuStep UE/LE  - L4   -WS      Time 9  5 min  -WS         Exercise 10    Exercise Name 10  LAQ  -WS      Reps 10  10  -WS      Additional Comments  2#  -        User Key  (r) = Recorded By, (t) = Taken By, (c) = Cosigned By    Initials Name Provider Type     Chino Hein PTA Physical Therapy Assistant                         PT OP Goals     Row Name 01/16/19 1300          PT Short Term Goals    STG 1  Patient will be independent with initial HEP for ROM/flexibility without increased symptoms  -     STG 1 Progress  Ongoing  -     STG 2  Patient will be independent with education for symptom management, joint protection, and strategies to minimize stress on affected tissues.  -     STG 2 Progress  Ongoing  -     STG 3  Patient will have R knee flexion AROM >/= 105 degrees for increased ease/tolerance with ADL's/transitional movements  -     STG 3 Progress  Ongoing  -     STG 4  Patient will have R knee extension ROM </= 5 degrees for normalization of heel to toe gait mechanics  -     STG 4 Progress  Ongoing  -        Long Term Goals    LTG 1  Patient will be independent with established HEP for strength/stabilization to facilitate self management of condition  -     LTG 2  Patient will demonstrate nearly normal gait without AD to facilitate return to community activities  -     LTG 3  Patient will have hip/knee strength 4/5 to 4+/5 for increased ease/tolerance with prolonged WB activity   -     LTG 4  Patient will be able to go up/down stairs reciprocally with </= 1HR support and minimal pain/compensation for greater ease with community mobility.  -     LTG 5  Patient will have improved score on KOS Activities of Daily Living Scale from39/80 to >/= 53/80 demonstrating reduced functional limitations.   -       User Key  (r) = Recorded By, (t) = Taken By, (c) = Cosigned  By    Initials Name Provider Type    Chino Johnson PTA Physical Therapy Assistant          Therapy Education  Given: HEP, Symptoms/condition management, Pain management, Edema management  Program: Reinforced  How Provided: Verbal, Demonstration  Provided to: Patient              Time Calculation:   Start Time: 1300  Stop Time: 1340  Time Calculation (min): 40 min  Therapy Suggested Charges     Code   Minutes Charges    53745 (CPT®) Hc Pt Neuromusc Re Education Ea 15 Min      24091 (CPT®) Hc Pt Ther Proc Ea 15 Min 35 2    40023 (CPT®) Hc Gait Training Ea 15 Min      07801 (CPT®) Hc Pt Therapeutic Act Ea 15 Min      66499 (CPT®) Hc Pt Manual Therapy Ea 15 Min      00151 (CPT®) Hc Pt Ther Massage- Per 15 Min      14965 (CPT®) Hc Pt Iontophoresis Ea 15 Min      94370 (CPT®) Hc Pt Elec Stim Ea-Per 15 Min      70283 (CPT®) Hc Pt Ultrasound Ea 15 Min      64196 (CPT®) Hc Pt Self Care/Mgmt/Train Ea 15 Min      24626 (CPT®) Hc Pt Prosthetic (S) Train Initial Encounter, Each 15 Min      15551 (CPT®) Hc Orthotic(S) Mgmt/Train Initial Encounter, Each 15min      25194 (CPT®) Hc Pt Aquatic Therapy Ea 15 Min      43944 (CPT®) Hc Pt Orthotic(S)/Prosthetic(S) Encounter, Each 15 Min       (CPT®) Hc Pt Electrical Stim Unattended      Total  35 2        Therapy Charges for Today     Code Description Service Date Service Provider Modifiers Qty    76869077051 HC PT THER PROC EA 15 MIN 1/16/2019 Chino Hein PTA GP 2    05203325403 HC PT HOT OR COLD PACK TREAT MCARE 1/16/2019 Chino Hein PTA GP 1                    Chino Hein PTA  1/16/2019

## 2019-01-18 ENCOUNTER — APPOINTMENT (OUTPATIENT)
Dept: PHYSICAL THERAPY | Facility: HOSPITAL | Age: 74
End: 2019-01-18
Attending: ORTHOPAEDIC SURGERY

## 2019-01-21 ENCOUNTER — HOSPITAL ENCOUNTER (OUTPATIENT)
Dept: PHYSICAL THERAPY | Facility: HOSPITAL | Age: 74
Setting detail: THERAPIES SERIES
Discharge: HOME | End: 2019-01-21
Attending: ORTHOPAEDIC SURGERY

## 2019-01-21 DIAGNOSIS — M25.561 CHRONIC PAIN OF RIGHT KNEE: ICD-10-CM

## 2019-01-21 DIAGNOSIS — M25.561 ACUTE PAIN OF RIGHT KNEE: ICD-10-CM

## 2019-01-21 DIAGNOSIS — G89.29 CHRONIC PAIN OF RIGHT KNEE: ICD-10-CM

## 2019-01-21 DIAGNOSIS — Z96.651 STATUS POST RIGHT KNEE REPLACEMENT: Primary | ICD-10-CM

## 2019-01-21 DIAGNOSIS — Z47.89 ORTHOPEDIC AFTERCARE: ICD-10-CM

## 2019-01-21 DIAGNOSIS — R26.89 IMPAIRED GAIT AND MOBILITY: ICD-10-CM

## 2019-01-21 PROCEDURE — 97110 THERAPEUTIC EXERCISES: CPT

## 2019-01-21 NOTE — THERAPY TREATMENT NOTE
Outpatient Physical Therapy Ortho Treatment Note  [facility]     Patient Name: Елена Beach  : 1945  MRN: [id number]  Today's Date: 2019      Visit Date: 2019    Visit Dx:    ICD-10-CM ICD-9-CM   1. Status post right knee replacement Z96.651 V43.65   2. Acute pain of right knee M25.561 719.46   3. Orthopedic aftercare Z47.89 V54.9   4. Impaired gait and mobility R26.89 781.2   5. Chronic pain of right knee M25.561 719.46    G89.29 338.29       Patient Active Problem List   Diagnosis   • Fibromyalgia   • Arthritis   • Hiatal hernia   • Anxiety and depression   • Asthma   • Hyperlipidemia   • Iron deficiency anemia   • Chronic obstructive pulmonary disease (CMS/HCC)   • Acute URI   • Chronic fatigue   • Snoring   • Dependence on nicotine from cigarettes   • Insomnia   • Osteopenia   • Screening for diabetes mellitus   • Bronchitis   • Arthritis of knee   • Osteoarthritis of knee, unspecified        Past Medical History:   Diagnosis Date   • Anemia    • Arthritis    • Asthma    • Back pain    • Bursitis    • COPD (chronic obstructive pulmonary disease) (CMS/[facility])    • Fibromyalgia    • Fibromyositis    • GERD (gastroesophageal reflux disease)    • Hiatal hernia    • History of pneumonia    • Hyperlipidemia    • Knee pain    • Osteoarthritis    • Osteoporosis    • PONV (postoperative nausea and vomiting)    • Scoliosis         Past Surgical History:   Procedure Laterality Date   • COLONOSCOPY     • ENDOSCOPY     • FOOT SURGERY Right 2006   • HAND SURGERY     • SHOULDER ARTHROSCOPY     • SHOULDER SURGERY Left    • TOE SURGERY Right    • TOOTH EXTRACTION     • TOTAL KNEE ARTHROPLASTY Right 2018    Procedure: TOTAL KNEE ARTHROPLASTY;  Surgeon: Gerry Estevez MD;  Location: [facility] OR;  Service: Orthopedics   • TUBAL ABDOMINAL LIGATION     • WRIST SURGERY                         PT Assessment/Plan     Row Name 19 1552          PT Assessment    Assessment  Comments  Improved passive flex to 105. Patient has decreased strength and decreased tolerance to exercises. Consider TKE next visit  -WS       User Key  (r) = Recorded By, (t) = Taken By, (c) = Cosigned By    Initials Name Provider Type    Chino Johnson PTA Physical Therapy Assistant          Modalities     Row Name 01/21/19 1525             Ice    Ice Applied  Yes  -WS      Location  R knee  -WS      Rx Minutes  10 mins  -WS      Ice S/P Rx  Yes  -WS        User Key  (r) = Recorded By, (t) = Taken By, (c) = Cosigned By    Initials Name Provider Type    Chino Johnson PTA Physical Therapy Assistant          Exercises     Row Name 01/21/19 1525             Subjective Comments    Subjective Comments  Pain is constant  -WS         Subjective Pain    Able to rate subjective pain?  yes  -WS      Pre-Treatment Pain Level  4  -WS         Total Minutes    03494 - PT Therapeutic Exercise Minutes  35  -WS         Exercise 1    Exercise Name 1  QS   -WS      Reps 1  15  -WS      Additional Comments  towel  -WS         Exercise 2    Exercise Name 2  SAQ  -WS      Reps 2  15  -WS         Exercise 3    Exercise Name 3  heel prop   -WS      Time 3  30 sec  -WS         Exercise 4    Exercise Name 4  HR  -WS      Reps 4  20  -WS         Exercise 5    Exercise Name 5  HS curls  -WS      Reps 5  15  -WS      Additional Comments  2#  -WS         Exercise 6    Exercise Name 6  mini squats  -WS      Reps 6  15  -WS         Exercise 7    Exercise Name 7  standing hip abd  -WS      Reps 7  20  -WS      Additional Comments  2#  -WS         Exercise 8    Exercise Name 8  knee flexion stretch on stairs   -WS      Reps 8  3  -WS      Time 8  20s  -WS         Exercise 9    Exercise Name 9  NuStep UE/LE  - L5  -WS      Time 9  5 min  -WS         Exercise 10    Exercise Name 10  LAQ  -WS      Reps 10  15  -WS      Additional Comments  3#  -WS         Exercise 11    Exercise Name 11  add TKE  -WS         Exercise 12    Exercise  Name 12  A/P flex 95/105  -        User Key  (r) = Recorded By, (t) = Taken By, (c) = Cosigned By    Initials Name Provider Type    Chino Johnson PTA Physical Therapy Assistant                         PT OP Goals     Row Name 01/21/19 1500          PT Short Term Goals    STG 1  Patient will be independent with initial HEP for ROM/flexibility without increased symptoms  -WS     STG 1 Progress  Met  -WS     STG 2  Patient will be independent with education for symptom management, joint protection, and strategies to minimize stress on affected tissues.  -WS     STG 2 Progress  Ongoing  -WS     STG 3  Patient will have R knee flexion AROM >/= 105 degrees for increased ease/tolerance with ADL's/transitional movements  -WS     STG 3 Progress  Ongoing  -WS     STG 4  Patient will have R knee extension ROM </= 5 degrees for normalization of heel to toe gait mechanics  -WS     STG 4 Progress  Ongoing  -        Long Term Goals    LTG 1  Patient will be independent with established HEP for strength/stabilization to facilitate self management of condition  -WS     LTG 2  Patient will demonstrate nearly normal gait without AD to facilitate return to community activities  -WS     LTG 3  Patient will have hip/knee strength 4/5 to 4+/5 for increased ease/tolerance with prolonged WB activity   -WS     LTG 4  Patient will be able to go up/down stairs reciprocally with </= 1HR support and minimal pain/compensation for greater ease with community mobility.  -     LTG 5  Patient will have improved score on KOS Activities of Daily Living Scale from39/80 to >/= 53/80 demonstrating reduced functional limitations.   -       User Key  (r) = Recorded By, (t) = Taken By, (c) = Cosigned By    Initials Name Provider Type    Chino Johnson PTA Physical Therapy Assistant                         Time Calculation:   Start Time: 1525  Stop Time: 1610  Time Calculation (min): 45 min  Therapy Suggested Charges     Code   Minutes  Charges    58674 (CPT®) Hc Pt Neuromusc Re Education Ea 15 Min      35713 (CPT®) Hc Pt Ther Proc Ea 15 Min 35 2    99614 (CPT®) Hc Gait Training Ea 15 Min      77418 (CPT®) Hc Pt Therapeutic Act Ea 15 Min      12866 (CPT®) Hc Pt Manual Therapy Ea 15 Min      42082 (CPT®) Hc Pt Ther Massage- Per 15 Min      60612 (CPT®) Hc Pt Iontophoresis Ea 15 Min      77528 (CPT®) Hc Pt Elec Stim Ea-Per 15 Min      97610 (CPT®) Hc Pt Ultrasound Ea 15 Min      08451 (CPT®) Hc Pt Self Care/Mgmt/Train Ea 15 Min      64645 (CPT®) Hc Pt Prosthetic (S) Train Initial Encounter, Each 15 Min      96764 (CPT®) Hc Orthotic(S) Mgmt/Train Initial Encounter, Each 15min      19889 (CPT®) Hc Pt Aquatic Therapy Ea 15 Min      32038 (CPT®) Hc Pt Orthotic(S)/Prosthetic(S) Encounter, Each 15 Min       (CPT®) Hc Pt Electrical Stim Unattended      Total  35 2        Therapy Charges for Today     Code Description Service Date Service Provider Modifiers Qty    98933061839 HC PT THER PROC EA 15 MIN 1/21/2019 Chino Hein PTA GP 2    63078031932 HC PT HOT OR COLD PACK TREAT MCARE 1/21/2019 Chino Hein, PTA GP 1                    Chino Hein PTA  1/21/2019

## 2019-01-22 ENCOUNTER — OFFICE VISIT (OUTPATIENT)
Dept: FAMILY MEDICINE CLINIC | Facility: CLINIC | Age: 74
End: 2019-01-22

## 2019-01-22 ENCOUNTER — HOSPITAL ENCOUNTER (OUTPATIENT)
Dept: CARDIOLOGY | Facility: HOSPITAL | Age: 74
Discharge: HOME | End: 2019-01-22

## 2019-01-22 ENCOUNTER — HOSPITAL ENCOUNTER (OUTPATIENT)
Dept: CT IMAGING | Facility: HOSPITAL | Age: 74
Discharge: HOME | End: 2019-01-22
Admitting: NURSE PRACTITIONER

## 2019-01-22 VITALS
DIASTOLIC BLOOD PRESSURE: 64 MMHG | OXYGEN SATURATION: 97 % | HEART RATE: 98 BPM | HEIGHT: 62 IN | SYSTOLIC BLOOD PRESSURE: 100 MMHG | WEIGHT: 106.6 LBS | BODY MASS INDEX: 19.62 KG/M2

## 2019-01-22 DIAGNOSIS — Z98.890 STATUS POST SURGERY: ICD-10-CM

## 2019-01-22 DIAGNOSIS — R06.02 SHORTNESS OF BREATH: Primary | ICD-10-CM

## 2019-01-22 DIAGNOSIS — R06.02 SHORTNESS OF BREATH: ICD-10-CM

## 2019-01-22 DIAGNOSIS — M79.661 RIGHT CALF PAIN: ICD-10-CM

## 2019-01-22 DIAGNOSIS — D50.8 OTHER IRON DEFICIENCY ANEMIA: Primary | ICD-10-CM

## 2019-01-22 PROBLEM — M17.10 ARTHRITIS OF KNEE: Status: RESOLVED | Noted: 2018-10-18 | Resolved: 2019-01-22

## 2019-01-22 PROBLEM — M17.9 OSTEOARTHRITIS OF KNEE, UNSPECIFIED: Status: RESOLVED | Noted: 2018-12-27 | Resolved: 2019-01-22

## 2019-01-22 PROBLEM — J40 BRONCHITIS: Status: RESOLVED | Noted: 2018-03-29 | Resolved: 2019-01-22

## 2019-01-22 PROBLEM — Z13.1 SCREENING FOR DIABETES MELLITUS: Status: RESOLVED | Noted: 2018-02-20 | Resolved: 2019-01-22

## 2019-01-22 LAB
BH CV LOWER VASCULAR LEFT COMMON FEMORAL AUGMENT: NORMAL
BH CV LOWER VASCULAR LEFT COMMON FEMORAL COMPETENT: NORMAL
BH CV LOWER VASCULAR LEFT COMMON FEMORAL COMPRESS: NORMAL
BH CV LOWER VASCULAR LEFT COMMON FEMORAL PHASIC: NORMAL
BH CV LOWER VASCULAR LEFT COMMON FEMORAL SPONT: NORMAL
BH CV LOWER VASCULAR RIGHT COMMON FEMORAL AUGMENT: NORMAL
BH CV LOWER VASCULAR RIGHT COMMON FEMORAL COMPETENT: NORMAL
BH CV LOWER VASCULAR RIGHT COMMON FEMORAL COMPRESS: NORMAL
BH CV LOWER VASCULAR RIGHT COMMON FEMORAL PHASIC: NORMAL
BH CV LOWER VASCULAR RIGHT COMMON FEMORAL SPONT: NORMAL
BH CV LOWER VASCULAR RIGHT DISTAL FEMORAL COMPRESS: NORMAL
BH CV LOWER VASCULAR RIGHT GASTRONEMIUS COMPRESS: NORMAL
BH CV LOWER VASCULAR RIGHT GREATER SAPH AK COMPRESS: NORMAL
BH CV LOWER VASCULAR RIGHT GREATER SAPH BK COMPRESS: NORMAL
BH CV LOWER VASCULAR RIGHT LESSER SAPH COMPRESS: NORMAL
BH CV LOWER VASCULAR RIGHT MID FEMORAL AUGMENT: NORMAL
BH CV LOWER VASCULAR RIGHT MID FEMORAL COMPETENT: NORMAL
BH CV LOWER VASCULAR RIGHT MID FEMORAL COMPRESS: NORMAL
BH CV LOWER VASCULAR RIGHT MID FEMORAL PHASIC: NORMAL
BH CV LOWER VASCULAR RIGHT MID FEMORAL SPONT: NORMAL
BH CV LOWER VASCULAR RIGHT PERONEAL COMPRESS: NORMAL
BH CV LOWER VASCULAR RIGHT POPLITEAL AUGMENT: NORMAL
BH CV LOWER VASCULAR RIGHT POPLITEAL COMPETENT: NORMAL
BH CV LOWER VASCULAR RIGHT POPLITEAL COMPRESS: NORMAL
BH CV LOWER VASCULAR RIGHT POPLITEAL PHASIC: NORMAL
BH CV LOWER VASCULAR RIGHT POPLITEAL SPONT: NORMAL
BH CV LOWER VASCULAR RIGHT POSTERIOR TIBIAL COMPRESS: NORMAL
BH CV LOWER VASCULAR RIGHT PROXIMAL FEMORAL COMPRESS: NORMAL
BH CV LOWER VASCULAR RIGHT SAPHENOFEMORAL JUNCTION AUGMENT: NORMAL
BH CV LOWER VASCULAR RIGHT SAPHENOFEMORAL JUNCTION COMPETENT: NORMAL
BH CV LOWER VASCULAR RIGHT SAPHENOFEMORAL JUNCTION COMPRESS: NORMAL
BH CV LOWER VASCULAR RIGHT SAPHENOFEMORAL JUNCTION PHASIC: NORMAL
BH CV LOWER VASCULAR RIGHT SAPHENOFEMORAL JUNCTION SPONT: NORMAL
CREAT BLDA-MCNC: 0.6 MG/DL (ref 0.6–1.3)

## 2019-01-22 PROCEDURE — 99214 OFFICE O/P EST MOD 30 MIN: CPT | Performed by: NURSE PRACTITIONER

## 2019-01-22 PROCEDURE — 0 IOPAMIDOL PER 1 ML: Performed by: NURSE PRACTITIONER

## 2019-01-22 PROCEDURE — 93971 EXTREMITY STUDY: CPT

## 2019-01-22 PROCEDURE — 82565 ASSAY OF CREATININE: CPT

## 2019-01-22 PROCEDURE — 71275 CT ANGIOGRAPHY CHEST: CPT

## 2019-01-22 RX ADMIN — IOPAMIDOL 95 ML: 755 INJECTION, SOLUTION INTRAVENOUS at 15:07

## 2019-01-22 NOTE — PROGRESS NOTES
"Subjective   Елена Beach is a 73 y.o. female.     History of Present Illness   Patient presenting to the office today with concerns over fatigue and low blood pressure.  She does suffer from chronic fatigue she does have a history of iron deficiency anemia and she supposed to be taking iron daily but she only takes it every other day.  She is also complaining of right calf pain she had right knee replacement about 5 weeks ago and the Pain is been going on ever since the first year.  She also does complain of some shortness of breath and coughing but she does have COPD she uses a nebulizer treatment when he gets very bad and it does seem to help.  The following portions of the patient's history were reviewed and updated as appropriate: allergies, current medications, past social history and problem list.    Review of Systems   All other systems reviewed and are negative.      Objective   /64 (BP Location: Left arm, Patient Position: Sitting)   Pulse 98   Ht 157.5 cm (62.01\")   Wt 48.4 kg (106 lb 9.6 oz)   SpO2 97%   BMI 19.49 kg/m²   Physical Exam   Constitutional: She is oriented to person, place, and time. Vital signs are normal. She appears well-developed and well-nourished. No distress.   HENT:   Head: Normocephalic.   Cardiovascular: Normal rate, regular rhythm and normal heart sounds.   Pulmonary/Chest: Effort normal and breath sounds normal.   Neurological: She is alert and oriented to person, place, and time. Gait normal.   Psychiatric: She has a normal mood and affect. Her behavior is normal. Judgment and thought content normal.   Vitals reviewed.      Assessment/Plan      Diagnosis Plan   1. Other iron deficiency anemia  CBC & Differential    Iron Profile   2. Right calf pain  Duplex Venous Lower Extremity - Right CAR   3. Shortness of breath  CT Chest With Contrast   4. Status post surgery  Duplex Venous Lower Extremity - Right CAR    CT Chest With Contrast     Follow up after labs and " test    Caleb Velez, APRN  1/22/2019

## 2019-01-23 ENCOUNTER — HOSPITAL ENCOUNTER (OUTPATIENT)
Dept: PHYSICAL THERAPY | Facility: HOSPITAL | Age: 74
Setting detail: THERAPIES SERIES
Discharge: HOME | End: 2019-01-23
Attending: ORTHOPAEDIC SURGERY

## 2019-01-23 DIAGNOSIS — R26.89 IMPAIRED GAIT AND MOBILITY: ICD-10-CM

## 2019-01-23 DIAGNOSIS — M25.561 CHRONIC PAIN OF RIGHT KNEE: ICD-10-CM

## 2019-01-23 DIAGNOSIS — M25.561 ACUTE PAIN OF RIGHT KNEE: ICD-10-CM

## 2019-01-23 DIAGNOSIS — G89.29 CHRONIC PAIN OF RIGHT KNEE: ICD-10-CM

## 2019-01-23 DIAGNOSIS — Z96.651 STATUS POST RIGHT KNEE REPLACEMENT: Primary | ICD-10-CM

## 2019-01-23 DIAGNOSIS — Z47.89 ORTHOPEDIC AFTERCARE: ICD-10-CM

## 2019-01-23 LAB
BASOPHILS # BLD AUTO: 0.04 10*3/MM3 (ref 0–0.2)
BASOPHILS NFR BLD AUTO: 0.7 % (ref 0–1.5)
EOSINOPHIL # BLD AUTO: 0.72 10*3/MM3 (ref 0–0.7)
EOSINOPHIL NFR BLD AUTO: 12.9 % (ref 0.3–6.2)
ERYTHROCYTE [DISTWIDTH] IN BLOOD BY AUTOMATED COUNT: 13.4 % (ref 11.7–13)
FERRITIN SERPL-MCNC: 80.6 NG/ML (ref 13–150)
HCT VFR BLD AUTO: 38.6 % (ref 35.6–45.5)
HGB BLD-MCNC: 12.2 G/DL (ref 11.9–15.5)
IMM GRANULOCYTES # BLD AUTO: 0.01 10*3/MM3 (ref 0–0.03)
IMM GRANULOCYTES NFR BLD AUTO: 0.2 % (ref 0–0.5)
IRON SATN MFR SERPL: 27 % (ref 20–50)
IRON SERPL-MCNC: 126 MCG/DL (ref 37–145)
LYMPHOCYTES # BLD AUTO: 1.74 10*3/MM3 (ref 0.9–4.8)
LYMPHOCYTES NFR BLD AUTO: 31.1 % (ref 19.6–45.3)
MCH RBC QN AUTO: 30.6 PG (ref 26.9–32)
MCHC RBC AUTO-ENTMCNC: 31.6 G/DL (ref 32.4–36.3)
MCV RBC AUTO: 96.7 FL (ref 80.5–98.2)
MONOCYTES # BLD AUTO: 0.61 10*3/MM3 (ref 0.2–1.2)
MONOCYTES NFR BLD AUTO: 10.9 % (ref 5–12)
NEUTROPHILS # BLD AUTO: 2.48 10*3/MM3 (ref 1.9–8.1)
NEUTROPHILS NFR BLD AUTO: 44.4 % (ref 42.7–76)
PLATELET # BLD AUTO: 488 10*3/MM3 (ref 140–500)
RBC # BLD AUTO: 3.99 10*6/MM3 (ref 3.9–5.2)
TIBC SERPL-MCNC: 461 MCG/DL
UIBC SERPL-MCNC: 335 MCG/DL
WBC # BLD AUTO: 5.59 10*3/MM3 (ref 4.5–10.7)

## 2019-01-23 PROCEDURE — 97110 THERAPEUTIC EXERCISES: CPT

## 2019-01-23 RX ORDER — METHYLPREDNISOLONE 4 MG/1
TABLET ORAL
Qty: 1 EACH | Refills: 0 | Status: SHIPPED | OUTPATIENT
Start: 2019-01-23 | End: 2019-02-12

## 2019-01-23 NOTE — THERAPY TREATMENT NOTE
Outpatient Physical Therapy Ortho Treatment Note  [facility]     Patient Name: Елена Beach  : 1945  MRN: [id number]  Today's Date: 2019      Visit Date: 2019    Visit Dx:    ICD-10-CM ICD-9-CM   1. Status post right knee replacement Z96.651 V43.65   2. Acute pain of right knee M25.561 719.46   3. Orthopedic aftercare Z47.89 V54.9   4. Chronic pain of right knee M25.561 719.46    G89.29 338.29   5. Impaired gait and mobility R26.89 781.2       Patient Active Problem List   Diagnosis   • Fibromyalgia   • Arthritis   • Hiatal hernia   • Anxiety and depression   • Asthma   • Hyperlipidemia   • Iron deficiency anemia   • Chronic obstructive pulmonary disease (CMS/[facility])   • Chronic fatigue   • Snoring   • Dependence on nicotine from cigarettes   • Insomnia   • Osteopenia   • Right calf pain   • Shortness of breath   • Status post surgery        Past Medical History:   Diagnosis Date   • Anemia    • Arthritis    • Asthma    • Back pain    • Bursitis    • COPD (chronic obstructive pulmonary disease) (CMS/[facility])    • Fibromyalgia    • Fibromyositis    • GERD (gastroesophageal reflux disease)    • Hiatal hernia    • History of pneumonia    • Hyperlipidemia    • Knee pain    • Osteoarthritis    • Osteoporosis    • PONV (postoperative nausea and vomiting)    • Scoliosis         Past Surgical History:   Procedure Laterality Date   • COLONOSCOPY     • ENDOSCOPY     • FOOT SURGERY Right 2006   • HAND SURGERY     • SHOULDER ARTHROSCOPY     • SHOULDER SURGERY Left    • TOE SURGERY Right    • TOOTH EXTRACTION     • TOTAL KNEE ARTHROPLASTY Right 2018    Procedure: TOTAL KNEE ARTHROPLASTY;  Surgeon: Gerry Estevez MD;  Location: [facility] OR;  Service: Orthopedics   • TUBAL ABDOMINAL LIGATION     • WRIST SURGERY                         PT Assessment/Plan     Row Name 19 1434          PT Assessment    Assessment Comments  Pt with inc distress today due to fatigue,  "difficulty driving, and difficulty with ADLs and household tasks. She also reports difficulty breathing due to moist air. Has seen MD yesterday who ruled out PE and DVT. Able to perform ROM and gentle strengthening with gentle encouragement and reassurance.   -LS        PT Plan    PT Plan Comments  Continue to encourage ROM and strengthening as able.   -LS       User Key  (r) = Recorded By, (t) = Taken By, (c) = Cosigned By    Initials Name Provider Type    LS Dona Gramajo, PT Physical Therapist              Exercises     Row Name 01/23/19 1400             Subjective Comments    Subjective Comments  Pt enters slightly anxious and frantic. \"Can I suspend my PT until I get a little more energy, I am just so fatigued I can't do anything. I need a  to get me here. This is too much work. I am having trouble breathing due to my COPD. I had tests yesterday that I do not have a blood clot.\"   -LS         Subjective Pain    Able to rate subjective pain?  yes  -LS      Pre-Treatment Pain Level  2  -LS      Subjective Pain Comment  My pain is low.  -LS         Total Minutes    16139 - PT Therapeutic Exercise Minutes  40  -LS         Exercise 1    Exercise Name 1  QS   -LS      Reps 1  15  -LS      Time 1  5  -LS      Additional Comments  towel  -LS         Exercise 2    Exercise Name 2  SAQ  -LS      Sets 2  2  -LS      Reps 2  10  -LS      Additional Comments  2#  -LS         Exercise 3    Exercise Name 3  heel prop   -LS      Time 3  30 sec  -LS         Exercise 4    Exercise Name 4  HR  -LS      Reps 4  20  -LS         Exercise 6    Exercise Name 6  mini squats  -LS      Sets 6  2  -LS      Reps 6  10  -LS         Exercise 8    Exercise Name 8  knee flexion stretch on stairs   -LS      Reps 8  3  -LS      Time 8  20s  -LS         Exercise 9    Exercise Name 9  NuStep UE/LE  - L5  -LS      Time 9  5 minutes  -LS         Exercise 10    Exercise Name 10  LAQ  -LS      Reps 10  15  -LS      Additional Comments  3#  -LS  "        Exercise 12    Exercise Name 12  wall slide  -LS      Reps 12  10  -LS        User Key  (r) = Recorded By, (t) = Taken By, (c) = Cosigned By    Initials Name Provider Type    Dona Azul PT Physical Therapist                         PT OP Goals     Row Name 01/23/19 1400          PT Short Term Goals    STG 1  Patient will be independent with initial HEP for ROM/flexibility without increased symptoms  -LS     STG 1 Progress  Met  -LS     STG 2  Patient will be independent with education for symptom management, joint protection, and strategies to minimize stress on affected tissues.  -LS     STG 2 Progress  Ongoing  -LS     STG 3  Patient will have R knee flexion AROM >/= 105 degrees for increased ease/tolerance with ADL's/transitional movements  -LS     STG 3 Progress  Ongoing  -LS     STG 4  Patient will have R knee extension ROM </= 5 degrees for normalization of heel to toe gait mechanics  -LS     STG 4 Progress  Ongoing  -LS        Long Term Goals    LTG 1  Patient will be independent with established HEP for strength/stabilization to facilitate self management of condition  -LS     LTG 2  Patient will demonstrate nearly normal gait without AD to facilitate return to community activities  -LS     LTG 3  Patient will have hip/knee strength 4/5 to 4+/5 for increased ease/tolerance with prolonged WB activity   -LS     LTG 4  Patient will be able to go up/down stairs reciprocally with </= 1HR support and minimal pain/compensation for greater ease with community mobility.  -LS     LTG 5  Patient will have improved score on KOS Activities of Daily Living Scale from39/80 to >/= 53/80 demonstrating reduced functional limitations.   -LS       User Key  (r) = Recorded By, (t) = Taken By, (c) = Cosigned By    Initials Name Provider Type    Dona Azul PT Physical Therapist          Therapy Education  Given: Symptoms/condition management  Program: Reinforced  How Provided: Verbal, Demonstration  Provided  to: Patient  Level of Understanding: Teach back education performed, Verbalized              Time Calculation:   Start Time: 1400  Stop Time: 1445  Time Calculation (min): 45 min  Total Timed Code Minutes- PT: 43 minute(s)  Therapy Suggested Charges     Code   Minutes Charges    84340 (CPT®) Hc Pt Neuromusc Re Education Ea 15 Min      58165 (CPT®) Hc Pt Ther Proc Ea 15 Min 40 3    87245 (CPT®) Hc Gait Training Ea 15 Min      35487 (CPT®) Hc Pt Therapeutic Act Ea 15 Min      93860 (CPT®) Hc Pt Manual Therapy Ea 15 Min      14519 (CPT®) Hc Pt Ther Massage- Per 15 Min      14268 (CPT®) Hc Pt Iontophoresis Ea 15 Min      98808 (CPT®) Hc Pt Elec Stim Ea-Per 15 Min      93727 (CPT®) Hc Pt Ultrasound Ea 15 Min      78460 (CPT®) Hc Pt Self Care/Mgmt/Train Ea 15 Min      19037 (CPT®) Hc Pt Prosthetic (S) Train Initial Encounter, Each 15 Min      05653 (CPT®) Hc Orthotic(S) Mgmt/Train Initial Encounter, Each 15min      36942 (CPT®) Hc Pt Aquatic Therapy Ea 15 Min      17892 (CPT®) Hc Pt Orthotic(S)/Prosthetic(S) Encounter, Each 15 Min       (CPT®) Hc Pt Electrical Stim Unattended      Total  40 3        Therapy Charges for Today     Code Description Service Date Service Provider Modifiers Qty    35694555295 HC PT THER PROC EA 15 MIN 1/23/2019 Dona Gramajo, PT GP 3                    Dona Gramajo, PT  1/23/2019

## 2019-01-25 ENCOUNTER — HOSPITAL ENCOUNTER (OUTPATIENT)
Dept: PHYSICAL THERAPY | Facility: HOSPITAL | Age: 74
Setting detail: THERAPIES SERIES
Discharge: HOME | End: 2019-01-25
Attending: ORTHOPAEDIC SURGERY

## 2019-01-25 DIAGNOSIS — M25.561 ACUTE PAIN OF RIGHT KNEE: ICD-10-CM

## 2019-01-25 DIAGNOSIS — Z47.89 ORTHOPEDIC AFTERCARE: ICD-10-CM

## 2019-01-25 DIAGNOSIS — R26.89 IMPAIRED GAIT AND MOBILITY: ICD-10-CM

## 2019-01-25 DIAGNOSIS — Z96.651 STATUS POST RIGHT KNEE REPLACEMENT: Primary | ICD-10-CM

## 2019-01-25 DIAGNOSIS — M25.561 CHRONIC PAIN OF RIGHT KNEE: ICD-10-CM

## 2019-01-25 DIAGNOSIS — G89.29 CHRONIC PAIN OF RIGHT KNEE: ICD-10-CM

## 2019-01-25 PROCEDURE — 97110 THERAPEUTIC EXERCISES: CPT

## 2019-01-25 NOTE — THERAPY TREATMENT NOTE
Outpatient Physical Therapy Ortho Treatment Note  [facility]     Patient Name: Елена Beach  : 1945  MRN: [id number]  Today's Date: 2019      Visit Date: 2019    Visit Dx:    ICD-10-CM ICD-9-CM   1. Status post right knee replacement Z96.651 V43.65   2. Acute pain of right knee M25.561 719.46   3. Orthopedic aftercare Z47.89 V54.9   4. Chronic pain of right knee M25.561 719.46    G89.29 338.29   5. Impaired gait and mobility R26.89 781.2       Patient Active Problem List   Diagnosis   • Fibromyalgia   • Arthritis   • Hiatal hernia   • Anxiety and depression   • Asthma   • Hyperlipidemia   • Iron deficiency anemia   • Chronic obstructive pulmonary disease (CMS/HCC)   • Chronic fatigue   • Snoring   • Dependence on nicotine from cigarettes   • Insomnia   • Osteopenia   • Right calf pain   • Shortness of breath   • Status post surgery        Past Medical History:   Diagnosis Date   • Anemia    • Arthritis    • Asthma    • Back pain    • Bursitis    • COPD (chronic obstructive pulmonary disease) (CMS/[facility])    • Fibromyalgia    • Fibromyositis    • GERD (gastroesophageal reflux disease)    • Hiatal hernia    • History of pneumonia    • Hyperlipidemia    • Knee pain    • Osteoarthritis    • Osteoporosis    • PONV (postoperative nausea and vomiting)    • Scoliosis         Past Surgical History:   Procedure Laterality Date   • COLONOSCOPY     • ENDOSCOPY     • FOOT SURGERY Right 2006   • HAND SURGERY     • SHOULDER ARTHROSCOPY  2014   • SHOULDER SURGERY Left    • TOE SURGERY Right    • TOOTH EXTRACTION     • TOTAL KNEE ARTHROPLASTY Right 2018    Procedure: TOTAL KNEE ARTHROPLASTY;  Surgeon: Gerry Esetvez MD;  Location: [facility] OR;  Service: Orthopedics   • TUBAL ABDOMINAL LIGATION     • WRIST SURGERY                         PT Assessment/Plan     Row Name 19 1139          PT Assessment    Assessment Comments  Patient continue to c/o fatigue. Progressing  with exercises.    -WS       User Key  (r) = Recorded By, (t) = Taken By, (c) = Cosigned By    Initials Name Provider Type    Chino Johnson PTA Physical Therapy Assistant              Exercises     Row Name 01/25/19 1100             Subjective Comments    Subjective Comments  Slept good last night  -WS         Subjective Pain    Able to rate subjective pain?  yes  -WS      Pre-Treatment Pain Level  1  -WS         Total Minutes    54373 - PT Therapeutic Exercise Minutes  40  -WS         Exercise 1    Exercise Name 1  QS   -WS      Reps 1  15  -WS      Time 1  5  -WS      Additional Comments  towel  -WS         Exercise 2    Exercise Name 2  SAQ  -WS      Sets 2  2  -WS      Reps 2  10  -WS      Additional Comments  3#  -WS         Exercise 3    Exercise Name 3  heel prop   -WS      Time 3  30 sec  -WS         Exercise 4    Exercise Name 4  HR  -WS      Reps 4  20  -WS         Exercise 5    Exercise Name 5  HS curls  -WS      Reps 5  15  -WS      Additional Comments  2#  -WS         Exercise 6    Exercise Name 6  mini squats  -WS      Sets 6  2  -WS      Reps 6  10  -WS         Exercise 7    Exercise Name 7  standing hip abd  -WS      Reps 7  20  -WS      Additional Comments  2#  -WS         Exercise 8    Exercise Name 8  knee flexion stretch on stairs   -WS      Reps 8  3  -WS      Time 8  20s  -WS         Exercise 9    Exercise Name 9  NuStep UE/LE  - L5  -WS      Time 9  5 minutes  -WS         Exercise 10    Exercise Name 10  LAQ  -WS      Reps 10  15  -WS      Additional Comments  4#  -WS         Exercise 12    Exercise Name 12  wall slide  -WS      Reps 12  10  -WS        User Key  (r) = Recorded By, (t) = Taken By, (c) = Cosigned By    Initials Name Provider Type    Chino Johnson PTA Physical Therapy Assistant                         PT OP Goals     Row Name 01/25/19 1100          PT Short Term Goals    STG 1  Patient will be independent with initial HEP for ROM/flexibility without increased  symptoms  -WS     STG 1 Progress  Met  -WS     STG 2  Patient will be independent with education for symptom management, joint protection, and strategies to minimize stress on affected tissues.  -WS     STG 2 Progress  Ongoing  -WS     STG 3  Patient will have R knee flexion AROM >/= 105 degrees for increased ease/tolerance with ADL's/transitional movements  -WS     STG 3 Progress  Ongoing  -WS     STG 4  Patient will have R knee extension ROM </= 5 degrees for normalization of heel to toe gait mechanics  -WS     STG 4 Progress  Ongoing  -        Long Term Goals    LTG 1  Patient will be independent with established HEP for strength/stabilization to facilitate self management of condition  -WS     LTG 2  Patient will demonstrate nearly normal gait without AD to facilitate return to community activities  -WS     LTG 3  Patient will have hip/knee strength 4/5 to 4+/5 for increased ease/tolerance with prolonged WB activity   -WS     LTG 4  Patient will be able to go up/down stairs reciprocally with </= 1HR support and minimal pain/compensation for greater ease with community mobility.  -WS     LTG 5  Patient will have improved score on KOS Activities of Daily Living Scale from39/80 to >/= 53/80 demonstrating reduced functional limitations.   -       User Key  (r) = Recorded By, (t) = Taken By, (c) = Cosigned By    Initials Name Provider Type    Chino Johnson PTA Physical Therapy Assistant          Therapy Education  Given: HEP, Symptoms/condition management  Program: Reinforced  How Provided: Verbal  Provided to: Patient              Time Calculation:   Start Time: 0100  Stop Time: 1150  Time Calculation (min): 650 min  Therapy Suggested Charges     Code   Minutes Charges    64183 (CPT®) Hc Pt Neuromusc Re Education Ea 15 Min      41010 (CPT®) Hc Pt Ther Proc Ea 15 Min 40 3    85847 (CPT®) Hc Gait Training Ea 15 Min      12709 (CPT®) Hc Pt Therapeutic Act Ea 15 Min      71965 (CPT®) Hc Pt Manual Therapy Ea  15 Min      48803 (CPT®) Hc Pt Ther Massage- Per 15 Min      95275 (CPT®) Hc Pt Iontophoresis Ea 15 Min      70626 (CPT®) Hc Pt Elec Stim Ea-Per 15 Min      14452 (CPT®) Hc Pt Ultrasound Ea 15 Min      42190 (CPT®) Hc Pt Self Care/Mgmt/Train Ea 15 Min      92645 (CPT®) Hc Pt Prosthetic (S) Train Initial Encounter, Each 15 Min      01343 (CPT®) Hc Orthotic(S) Mgmt/Train Initial Encounter, Each 15min      07368 (CPT®) Hc Pt Aquatic Therapy Ea 15 Min      64561 (CPT®) Hc Pt Orthotic(S)/Prosthetic(S) Encounter, Each 15 Min       (CPT®) Hc Pt Electrical Stim Unattended      Total  40 3        Therapy Charges for Today     Code Description Service Date Service Provider Modifiers Qty    97062928815 HC PT THER PROC EA 15 MIN 1/25/2019 Chino Hein PTA GP 3    64804371009 HC PT HOT OR COLD PACK TREAT MCARE 1/25/2019 Chino Hein PTA GP 1                    Chino Hein PTA  1/25/2019

## 2019-01-28 ENCOUNTER — HOSPITAL ENCOUNTER (OUTPATIENT)
Dept: PHYSICAL THERAPY | Facility: HOSPITAL | Age: 74
Setting detail: THERAPIES SERIES
Discharge: HOME | End: 2019-01-28
Attending: ORTHOPAEDIC SURGERY

## 2019-01-28 DIAGNOSIS — M25.561 CHRONIC PAIN OF RIGHT KNEE: ICD-10-CM

## 2019-01-28 DIAGNOSIS — M25.561 ACUTE PAIN OF RIGHT KNEE: ICD-10-CM

## 2019-01-28 DIAGNOSIS — R26.89 IMPAIRED GAIT AND MOBILITY: ICD-10-CM

## 2019-01-28 DIAGNOSIS — Z47.89 ORTHOPEDIC AFTERCARE: ICD-10-CM

## 2019-01-28 DIAGNOSIS — Z96.651 STATUS POST RIGHT KNEE REPLACEMENT: Primary | ICD-10-CM

## 2019-01-28 DIAGNOSIS — G89.29 CHRONIC PAIN OF RIGHT KNEE: ICD-10-CM

## 2019-01-28 PROCEDURE — 97110 THERAPEUTIC EXERCISES: CPT

## 2019-01-28 NOTE — THERAPY TREATMENT NOTE
Outpatient Physical Therapy Ortho Treatment Note  [facility]     Patient Name: Елена Beach  : 1945  MRN: [id number]  Today's Date: 2019      Visit Date: 2019    Visit Dx:    ICD-10-CM ICD-9-CM   1. Status post right knee replacement Z96.651 V43.65   2. Acute pain of right knee M25.561 719.46   3. Orthopedic aftercare Z47.89 V54.9   4. Impaired gait and mobility R26.89 781.2   5. Chronic pain of right knee M25.561 719.46    G89.29 338.29       Patient Active Problem List   Diagnosis   • Fibromyalgia   • Arthritis   • Hiatal hernia   • Anxiety and depression   • Asthma   • Hyperlipidemia   • Iron deficiency anemia   • Chronic obstructive pulmonary disease (CMS/Former[facility])   • Chronic fatigue   • Snoring   • Dependence on nicotine from cigarettes   • Insomnia   • Osteopenia   • Right calf pain   • Shortness of breath   • Status post surgery        Past Medical History:   Diagnosis Date   • Anemia    • Arthritis    • Asthma    • Back pain    • Bursitis    • COPD (chronic obstructive pulmonary disease) (CMS/Formerly [facility])    • Fibromyalgia    • Fibromyositis    • GERD (gastroesophageal reflux disease)    • Hiatal hernia    • History of pneumonia    • Hyperlipidemia    • Knee pain    • Osteoarthritis    • Osteoporosis    • PONV (postoperative nausea and vomiting)    • Scoliosis         Past Surgical History:   Procedure Laterality Date   • COLONOSCOPY     • ENDOSCOPY     • FOOT SURGERY Right 2006   • HAND SURGERY     • SHOULDER ARTHROSCOPY     • SHOULDER SURGERY Left    • TOE SURGERY Right    • TOOTH EXTRACTION     • TOTAL KNEE ARTHROPLASTY Right 2018    Procedure: TOTAL KNEE ARTHROPLASTY;  Surgeon: Gerry Estevez MD;  Location: [facility] OR;  Service: Orthopedics   • TUBAL ABDOMINAL LIGATION     • WRIST SURGERY                         PT Assessment/Plan     Row Name 19 1439          PT Assessment    Assessment Comments  Pt ambulating without AD today with antalgic  gait pattern and rigid RLE. Worked on improving gait mechanics at [employer] bar requiring max cuing to improve. Added TKE seated and standing to improve pt's ROM. Continued LE strengthening B today to introduce R knee stabilization.   -LS        PT Plan    PT Plan Comments  Continue gait training and strengthening. Encourage normalized gait pattern.   -LS       User Key  (r) = Recorded By, (t) = Taken By, (c) = Cosigned By    Initials Name Provider Type    LS Dona Gramajo, PT Physical Therapist              Exercises     Row Name 01/28/19 1400             Subjective Comments    Subjective Comments  I am doing pretty good. I don't like driving. I have people drive me as much as I can.  -LS         Subjective Pain    Able to rate subjective pain?  yes  -LS      Pre-Treatment Pain Level  1  -LS      Subjective Pain Comment  I am not using my cane today.  -LS         Total Minutes    78363 - PT Therapeutic Exercise Minutes  40  -LS         Exercise 1    Exercise Name 1  QS   -LS      Reps 1  15  -LS      Time 1  5  -LS      Additional Comments  seated into blue ball  -LS         Exercise 2    Exercise Name 2  SAQ  -LS      Sets 2  2  -LS      Reps 2  10  -LS      Additional Comments  3#  -LS         Exercise 3    Exercise Name 3  heel prop   -LS      Reps 3  15  -LS      Time 3  5  -LS      Additional Comments  with QS  -LS         Exercise 4    Exercise Name 4  HR  -LS      Reps 4  20  -LS         Exercise 5    Exercise Name 5  HS curls  -LS      Reps 5  15  -LS      Additional Comments  2#; B  -LS         Exercise 6    Exercise Name 6  mini squats  -LS      Sets 6  2  -LS      Reps 6  10  -LS         Exercise 7    Exercise Name 7  standing hip abd  -LS      Reps 7  20  -LS      Additional Comments  2#  -LS         Exercise 8    Exercise Name 8  knee flexion stretch on stairs   -LS      Reps 8  3  -LS      Time 8  20s  -LS         Exercise 9    Exercise Name 9  NuStep UE/LE  - L5  -LS      Time 9  5 minutes  -LS          Exercise 10    Exercise Name 10  LAQ  -LS      Reps 10  15  -LS      Additional Comments  4#  -LS         Exercise 11    Exercise Name 11  standing TKE  -LS      Reps 11  10  -LS      Time 11  3  -LS      Additional Comments  GTB  -LS         Exercise 12    Exercise Name 12  --  -LS      Reps 12  --  -LS         Exercise 13    Exercise Name 13  gait at ballet bar  -LS      Reps 13  5 laps  -LS        User Key  (r) = Recorded By, (t) = Taken By, (c) = Cosigned By    Initials Name Provider Type    Dona Azul, PT Physical Therapist                         PT OP Goals     Row Name 01/28/19 1400          PT Short Term Goals    STG 1  Patient will be independent with initial HEP for ROM/flexibility without increased symptoms  -LS     STG 1 Progress  Met  -LS     STG 2  Patient will be independent with education for symptom management, joint protection, and strategies to minimize stress on affected tissues.  -LS     STG 2 Progress  Ongoing  -LS     STG 3  Patient will have R knee flexion AROM >/= 105 degrees for increased ease/tolerance with ADL's/transitional movements  -LS     STG 3 Progress  Ongoing  -LS     STG 4  Patient will have R knee extension ROM </= 5 degrees for normalization of heel to toe gait mechanics  -LS     STG 4 Progress  Ongoing  -LS        Long Term Goals    LTG 1  Patient will be independent with established HEP for strength/stabilization to facilitate self management of condition  -LS     LTG 2  Patient will demonstrate nearly normal gait without AD to facilitate return to community activities  -LS     LTG 3  Patient will have hip/knee strength 4/5 to 4+/5 for increased ease/tolerance with prolonged WB activity   -LS     LTG 4  Patient will be able to go up/down stairs reciprocally with </= 1HR support and minimal pain/compensation for greater ease with community mobility.  -LS     LTG 5  Patient will have improved score on KOS Activities of Daily Living Scale from39/80 to >/= 53/80  demonstrating reduced functional limitations.   -       User Key  (r) = Recorded By, (t) = Taken By, (c) = Cosigned By    Initials Name Provider Type    Dona Azul, PT Physical Therapist          Therapy Education  Given: Symptoms/condition management  Program: Reinforced  How Provided: Verbal, Demonstration  Provided to: Patient  Level of Understanding: Teach back education performed, Verbalized, Demonstrated              Time Calculation:   Start Time: 1400  Stop Time: 1445  Time Calculation (min): 45 min  Total Timed Code Minutes- PT: 43 minute(s)  Therapy Suggested Charges     Code   Minutes Charges    95594 (CPT®) Hc Pt Neuromusc Re Education Ea 15 Min      84929 (CPT®) Hc Pt Ther Proc Ea 15 Min 40 3    08574 (CPT®) Hc Gait Training Ea 15 Min      23553 (CPT®) Hc Pt Therapeutic Act Ea 15 Min      63360 (CPT®) Hc Pt Manual Therapy Ea 15 Min      19675 (CPT®) Hc Pt Ther Massage- Per 15 Min      68052 (CPT®) Hc Pt Iontophoresis Ea 15 Min      56447 (CPT®) Hc Pt Elec Stim Ea-Per 15 Min      32906 (CPT®) Hc Pt Ultrasound Ea 15 Min      17829 (CPT®) Hc Pt Self Care/Mgmt/Train Ea 15 Min      76452 (CPT®) Hc Pt Prosthetic (S) Train Initial Encounter, Each 15 Min      52753 (CPT®) Hc Orthotic(S) Mgmt/Train Initial Encounter, Each 15min      08836 (CPT®) Hc Pt Aquatic Therapy Ea 15 Min      08409 (CPT®) Hc Pt Orthotic(S)/Prosthetic(S) Encounter, Each 15 Min       (CPT®) Hc Pt Electrical Stim Unattended      Total  40 3        Therapy Charges for Today     Code Description Service Date Service Provider Modifiers Qty    56792710265 HC PT THER PROC EA 15 MIN 1/28/2019 Dona Gramajo, PT GP 3                    Dona Gramajo, PT  1/28/2019

## 2019-01-29 ENCOUNTER — HOSPITAL ENCOUNTER (OUTPATIENT)
Dept: MAMMOGRAPHY | Facility: HOSPITAL | Age: 74
Discharge: HOME | End: 2019-01-29
Admitting: NURSE PRACTITIONER

## 2019-01-29 DIAGNOSIS — Z12.39 SCREENING BREAST EXAMINATION: ICD-10-CM

## 2019-01-29 PROCEDURE — 77067 SCR MAMMO BI INCL CAD: CPT

## 2019-01-29 PROCEDURE — 77063 BREAST TOMOSYNTHESIS BI: CPT

## 2019-01-30 ENCOUNTER — APPOINTMENT (OUTPATIENT)
Dept: PHYSICAL THERAPY | Facility: HOSPITAL | Age: 74
End: 2019-01-30
Attending: ORTHOPAEDIC SURGERY

## 2019-02-01 ENCOUNTER — HOSPITAL ENCOUNTER (OUTPATIENT)
Dept: PHYSICAL THERAPY | Facility: HOSPITAL | Age: 74
Setting detail: THERAPIES SERIES
Discharge: HOME | End: 2019-02-01
Attending: ORTHOPAEDIC SURGERY

## 2019-02-01 DIAGNOSIS — Z47.89 ORTHOPEDIC AFTERCARE: ICD-10-CM

## 2019-02-01 DIAGNOSIS — M25.561 ACUTE PAIN OF RIGHT KNEE: ICD-10-CM

## 2019-02-01 DIAGNOSIS — Z96.651 STATUS POST RIGHT KNEE REPLACEMENT: Primary | ICD-10-CM

## 2019-02-01 PROCEDURE — 97110 THERAPEUTIC EXERCISES: CPT

## 2019-02-01 NOTE — THERAPY TREATMENT NOTE
Outpatient Physical Therapy Ortho Treatment Note  [facility]     Patient Name: Елена Beach  : 1945  MRN: [id number]  Today's Date: 2019      Visit Date: 2019    Visit Dx:    ICD-10-CM ICD-9-CM   1. Status post right knee replacement Z96.651 V43.65   2. Acute pain of right knee M25.561 719.46   3. Orthopedic aftercare Z47.89 V54.9       Patient Active Problem List   Diagnosis   • Fibromyalgia   • Arthritis   • Hiatal hernia   • Anxiety and depression   • Asthma   • Hyperlipidemia   • Iron deficiency anemia   • Chronic obstructive pulmonary disease (CMS/[facility])   • Chronic fatigue   • Snoring   • Dependence on nicotine from cigarettes   • Insomnia   • Osteopenia   • Right calf pain   • Shortness of breath   • Status post surgery        Past Medical History:   Diagnosis Date   • Anemia    • Arthritis    • Asthma    • Back pain    • Bursitis    • COPD (chronic obstructive pulmonary disease) (CMS/[facility])    • Fibromyalgia    • Fibromyositis    • GERD (gastroesophageal reflux disease)    • Hiatal hernia    • History of pneumonia    • Hyperlipidemia    • Knee pain    • Osteoarthritis    • Osteoporosis    • PONV (postoperative nausea and vomiting)    • Scoliosis         Past Surgical History:   Procedure Laterality Date   • COLONOSCOPY     • ENDOSCOPY     • FOOT SURGERY Right 2006   • HAND SURGERY     • SHOULDER ARTHROSCOPY     • SHOULDER SURGERY Left    • TOE SURGERY Right    • TOOTH EXTRACTION     • TOTAL KNEE ARTHROPLASTY Right 2018    Procedure: TOTAL KNEE ARTHROPLASTY;  Surgeon: Gerry Estevez MD;  Location: [facility];  Service: Orthopedics   • TUBAL ABDOMINAL LIGATION     • WRIST SURGERY                         PT Assessment/Plan     Row Name 19 1418          PT Assessment    Assessment Comments  Pt distressed with nearly every visit over multiple pain locations, difficulty with tightness and fatigue. Antalgic gait pattern without SC. Repeated cuing to  improve gait mechanics with improvement with repetitions. However, pt is quick to return to old habits once cuing stops. Pt with limited compliance to HEP and long discussion today concerning the importance of continued ROM activities. Pt lacking 8 deg of knee extension at end of session.   -LS        PT Plan    PT Plan Comments  Reinforce HEP, continue ROM, LE strengthening.   -LS       User Key  (r) = Recorded By, (t) = Taken By, (c) = Cosigned By    Initials Name Provider Type    LS Dona Gramajo, PT Physical Therapist              Exercises     Row Name 02/01/19 1400 02/01/19 1300          Subjective Comments    Subjective Comments  I am having a rough day. Everything hurts.  -LS  --        Subjective Pain    Able to rate subjective pain?  yes  -LS  --     Pre-Treatment Pain Level  2  -LS  --     Subjective Pain Comment  My knee and back and stomach all hurt.  -LS  --        Total Minutes    54740 - PT Therapeutic Exercise Minutes  43  -LS  --     12528 - PT Manual Therapy Minutes  --  5  -LS        Exercise 1    Exercise Name 1  QS   -LS  --     Reps 1  15  -LS  --     Time 1  5  -LS  --     Additional Comments  seated into blue ball  -LS  --        Exercise 2    Exercise Name 2  SAQ  -LS  --     Sets 2  2  -LS  --     Reps 2  10  -LS  --     Additional Comments  3#  -LS  --        Exercise 3    Exercise Name 3  heel prop   -LS  --     Reps 3  15  -LS  --     Time 3  5  -LS  --     Additional Comments  with QS  -LS  --        Exercise 4    Exercise Name 4  HR  -LS  --     Reps 4  20  -LS  --        Exercise 5    Exercise Name 5  HS curls  -LS  --     Reps 5  15  -LS  --     Additional Comments  seated GTB  -LS  --        Exercise 6    Exercise Name 6  mini squats  -LS  --     Sets 6  2  -LS  --     Reps 6  10  -LS  --        Exercise 7    Exercise Name 7  standing hip abd  -LS  --     Reps 7  20  -LS  --     Additional Comments  2#  -LS  --        Exercise 8    Exercise Name 8  knee flexion stretch on stairs    -LS  --     Reps 8  3  -LS  --     Time 8  20s  -LS  --        Exercise 9    Exercise Name 9  NuStep UE/LE  - L5  -LS  --     Time 9  5 minutes  -LS  --        Exercise 10    Exercise Name 10  LAQ  -LS  --     Reps 10  15  -LS  --     Additional Comments  3#  -LS  --        Exercise 11    Exercise Name 11  standing TKE  -LS  --     Reps 11  10  -LS  --     Time 11  3  -LS  --     Additional Comments  GTB  -LS  --        Exercise 13    Exercise Name 13  gait at ballet bar  -LS  --     Reps 13  5 laps  -LS  --     Additional Comments  as well as into clinic  -LS  --        Exercise 14    Exercise Name 14  leg press  -LS  --     Sets 14  2  -LS  --     Reps 14  10  -LS  --     Additional Comments  60# BLE  -LS  --       User Key  (r) = Recorded By, (t) = Taken By, (c) = Cosigned By    Initials Name Provider Type    Dona Azul, PT Physical Therapist                        Manual Rx (last 36 hours)      Manual Treatments     Row Name 02/01/19 1300             Total Minutes    53343 - PT Manual Therapy Minutes  5  -LS         Manual Rx 1    Manual Rx 1 Location  overpressure into extension with gentle oscillations for pain control   -LS        User Key  (r) = Recorded By, (t) = Taken By, (c) = Cosigned By    Initials Name Provider Type    Dona Azul, PT Physical Therapist          PT OP Goals     Row Name 02/01/19 1400          PT Short Term Goals    STG 1  Patient will be independent with initial HEP for ROM/flexibility without increased symptoms  -LS     STG 1 Progress  Met  -LS     STG 2  Patient will be independent with education for symptom management, joint protection, and strategies to minimize stress on affected tissues.  -LS     STG 2 Progress  Ongoing  -LS     STG 3  Patient will have R knee flexion AROM >/= 105 degrees for increased ease/tolerance with ADL's/transitional movements  -LS     STG 3 Progress  Ongoing  -LS     STG 4  Patient will have R knee extension ROM </= 5 degrees for  normalization of heel to toe gait mechanics  -LS     STG 4 Progress  Ongoing  -LS        Long Term Goals    LTG 1  Patient will be independent with established HEP for strength/stabilization to facilitate self management of condition  -LS     LTG 2  Patient will demonstrate nearly normal gait without AD to facilitate return to community activities  -LS     LTG 3  Patient will have hip/knee strength 4/5 to 4+/5 for increased ease/tolerance with prolonged WB activity   -LS     LTG 4  Patient will be able to go up/down stairs reciprocally with </= 1HR support and minimal pain/compensation for greater ease with community mobility.  -LS     LTG 5  Patient will have improved score on KOS Activities of Daily Living Scale from39/80 to >/= 53/80 demonstrating reduced functional limitations.   -LS       User Key  (r) = Recorded By, (t) = Taken By, (c) = Cosigned By    Initials Name Provider Type    Dona Azul, PT Physical Therapist          Therapy Education  Education Details: strong encouragement to complete HEP 2x/day and improve gait mechanics   Given: HEP, Symptoms/condition management, Posture/body mechanics, Mobility training  Program: Reinforced  How Provided: Demonstration, Verbal  Provided to: Patient  Level of Understanding: Teach back education performed, Verbalized, Demonstrated              Time Calculation:   Start Time: 1315  Stop Time: 1410  Time Calculation (min): 55 min  Total Timed Code Minutes- PT: 44 minute(s)  Therapy Suggested Charges     Code   Minutes Charges    01429 (CPT®) Hc Pt Neuromusc Re Education Ea 15 Min      79689 (CPT®) Hc Pt Ther Proc Ea 15 Min 43 3    49418 (CPT®) Hc Gait Training Ea 15 Min      63198 (CPT®) Hc Pt Therapeutic Act Ea 15 Min      31929 (CPT®) Hc Pt Manual Therapy Ea 15 Min 5     78863 (CPT®) Hc Pt Ther Massage- Per 15 Min      68216 (CPT®) Hc Pt Iontophoresis Ea 15 Min      11573 (CPT®) Hc Pt Elec Stim Ea-Per 15 Min      11903 (CPT®) Hc Pt Ultrasound Ea 15 Min       26329 (CPT®) Hc Pt Self Care/Mgmt/Train Ea 15 Min      32559 (CPT®) Hc Pt Prosthetic (S) Train Initial Encounter, Each 15 Min      95933 (CPT®) Hc Orthotic(S) Mgmt/Train Initial Encounter, Each 15min      53336 (CPT®) Hc Pt Aquatic Therapy Ea 15 Min      42691 (CPT®) Hc Pt Orthotic(S)/Prosthetic(S) Encounter, Each 15 Min       (CPT®) Hc Pt Electrical Stim Unattended      Total  48 3        Therapy Charges for Today     Code Description Service Date Service Provider Modifiers Qty    09367772901 HC PT THER PROC EA 15 MIN 2/1/2019 Dona Gramajo, PT GP 3                    Dona Gramajo, PT  2/1/2019

## 2019-02-04 ENCOUNTER — HOSPITAL ENCOUNTER (OUTPATIENT)
Dept: PHYSICAL THERAPY | Facility: HOSPITAL | Age: 74
Setting detail: THERAPIES SERIES
Discharge: HOME | End: 2019-02-04
Attending: ORTHOPAEDIC SURGERY

## 2019-02-04 DIAGNOSIS — M25.561 CHRONIC PAIN OF RIGHT KNEE: ICD-10-CM

## 2019-02-04 DIAGNOSIS — Z96.651 STATUS POST RIGHT KNEE REPLACEMENT: Primary | ICD-10-CM

## 2019-02-04 DIAGNOSIS — M25.561 ACUTE PAIN OF RIGHT KNEE: ICD-10-CM

## 2019-02-04 DIAGNOSIS — G89.29 CHRONIC PAIN OF RIGHT KNEE: ICD-10-CM

## 2019-02-04 DIAGNOSIS — R26.89 IMPAIRED GAIT AND MOBILITY: ICD-10-CM

## 2019-02-04 DIAGNOSIS — Z47.89 ORTHOPEDIC AFTERCARE: ICD-10-CM

## 2019-02-04 PROCEDURE — 97110 THERAPEUTIC EXERCISES: CPT

## 2019-02-04 NOTE — THERAPY TREATMENT NOTE
Outpatient Physical Therapy Ortho Treatment Note  [facility]     Patient Name: Елена Beach  : 1945  MRN: [id number]  Today's Date: 2019      Visit Date: 2019    Visit Dx:    ICD-10-CM ICD-9-CM   1. Status post right knee replacement Z96.651 V43.65   2. Acute pain of right knee M25.561 719.46   3. Orthopedic aftercare Z47.89 V54.9   4. Impaired gait and mobility R26.89 781.2   5. Chronic pain of right knee M25.561 719.46    G89.29 338.29       Patient Active Problem List   Diagnosis   • Fibromyalgia   • Arthritis   • Hiatal hernia   • Anxiety and depression   • Asthma   • Hyperlipidemia   • Iron deficiency anemia   • Chronic obstructive pulmonary disease (CMS/[facility])   • Chronic fatigue   • Snoring   • Dependence on nicotine from cigarettes   • Insomnia   • Osteopenia   • Right calf pain   • Shortness of breath   • Status post surgery        Past Medical History:   Diagnosis Date   • Anemia    • Arthritis    • Asthma    • Back pain    • Bursitis    • COPD (chronic obstructive pulmonary disease) (CMS/[facility])    • Fibromyalgia    • Fibromyositis    • GERD (gastroesophageal reflux disease)    • Hiatal hernia    • History of pneumonia    • Hyperlipidemia    • Knee pain    • Osteoarthritis    • Osteoporosis    • PONV (postoperative nausea and vomiting)    • Scoliosis         Past Surgical History:   Procedure Laterality Date   • COLONOSCOPY     • ENDOSCOPY     • FOOT SURGERY Right 2006   • HAND SURGERY     • SHOULDER ARTHROSCOPY     • SHOULDER SURGERY Left    • TOE SURGERY Right    • TOOTH EXTRACTION     • TOTAL KNEE ARTHROPLASTY Right 2018    Procedure: TOTAL KNEE ARTHROPLASTY;  Surgeon: Gerry Estevez MD;  Location: [facility] OR;  Service: Orthopedics   • TUBAL ABDOMINAL LIGATION     • WRIST SURGERY                         PT Assessment/Plan     Row Name 19 1454          PT Assessment    Assessment Comments  Continued constant reinforcement to perform HEP  due to pt demonstrating ROM limitations. Added wall slide today with 105 deg AAROM with PT overpressure flexion. Continued ROM and strengthening with near constant cuing during gait in clinic to improve heel strike and TKE.   -LS        PT Plan    PT Plan Comments  Continue RLE strengthening, ROM.   -LS       User Key  (r) = Recorded By, (t) = Taken By, (c) = Cosigned By    Initials Name Provider Type    Dona Azul, PT Physical Therapist          Modalities     Row Name 02/04/19 1400             Ice    Ice Applied  Yes  -LS      Location  R knee in supine, knee propped on pillow  -LS      Rx Minutes  15 mins  -LS      Ice S/P Rx  Yes  -LS        User Key  (r) = Recorded By, (t) = Taken By, (c) = Cosigned By    Initials Name Provider Type    Dona Azul, PT Physical Therapist          Exercises     Row Name 02/04/19 1400 02/04/19 1300          Subjective Comments    Subjective Comments  I am feeling pretty good. I forget to do the exercises sometimes.  -LS  --        Subjective Pain    Able to rate subjective pain?  yes  -LS  --     Pre-Treatment Pain Level  2  -LS  --     Subjective Pain Comment  I don't have alot of pain in my knee, just my ankle.  -LS  --        Total Minutes    91160 - PT Therapeutic Exercise Minutes  44  -LS  --     01538 - PT Manual Therapy Minutes  --  5  -LS        Exercise 1    Exercise Name 1  QS   -LS  --     Reps 1  15  -LS  --     Time 1  5  -LS  --     Additional Comments  seated into blue ball  -LS  --        Exercise 2    Exercise Name 2  SAQ  -LS  --     Sets 2  2  -LS  --     Reps 2  10  -LS  --     Additional Comments  3#  -LS  --        Exercise 3    Exercise Name 3  heel prop   -LS  --     Reps 3  15  -LS  --     Time 3  5  -LS  --     Additional Comments  with QS  -LS  --        Exercise 4    Exercise Name 4  HR  -LS  --     Reps 4  20  -LS  --        Exercise 5    Exercise Name 5  HS curls  -LS  --     Sets 5  2  -LS  --     Reps 5  10  -LS  --     Additional  Comments  standing  -LS  --        Exercise 6    Exercise Name 6  mini squats  -LS  --     Sets 6  2  -LS  --     Reps 6  10  -LS  --        Exercise 7    Exercise Name 7  --  -LS  --     Reps 7  --  -LS  --        Exercise 8    Exercise Name 8  knee flexion stretch on stairs   -LS  --     Reps 8  3  -LS  --     Time 8  20s  -LS  --        Exercise 9    Exercise Name 9  NuStep UE/LE  - L5  -LS  --     Time 9  5 minutes  -LS  --        Exercise 10    Exercise Name 10  LAQ  -LS  --     Reps 10  15  -LS  --     Additional Comments  3#  -LS  --        Exercise 11    Exercise Name 11  standing TKE  -LS  --     Reps 11  10  -LS  --     Time 11  3  -LS  --     Additional Comments  GTB  -LS  --        Exercise 12    Exercise Name 12  wall side  -LS  --     Reps 12  10  -LS  --     Time 12  10  -LS  --        Exercise 13    Exercise Name 13  --  -LS  --     Reps 13  --  -LS  --        Exercise 14    Exercise Name 14  leg press  -LS  --     Sets 14  2  -LS  --     Reps 14  10  -LS  --     Additional Comments  65# BLE  -LS  --       User Key  (r) = Recorded By, (t) = Taken By, (c) = Cosigned By    Initials Name Provider Type    Dona Azul, PT Physical Therapist                        Manual Rx (last 36 hours)      Manual Treatments     Row Name 02/04/19 1300             Total Minutes    01305 - PT Manual Therapy Minutes  5  -LS         Manual Rx 1    Manual Rx 1 Location  overpressure into extension with gentle oscillations for pain control   -LS        User Key  (r) = Recorded By, (t) = Taken By, (c) = Cosigned By    Initials Name Provider Type    Dona Azul, PT Physical Therapist          PT OP Goals     Row Name 02/04/19 1400          PT Short Term Goals    STG 1  Patient will be independent with initial HEP for ROM/flexibility without increased symptoms  -LS     STG 1 Progress  Met  -LS     STG 2  Patient will be independent with education for symptom management, joint protection, and strategies to minimize  stress on affected tissues.  -LS     STG 2 Progress  Ongoing  -LS     STG 3  Patient will have R knee flexion AROM >/= 105 degrees for increased ease/tolerance with ADL's/transitional movements  -LS     STG 3 Progress  Ongoing  -LS     STG 3 Progress Comments  105 deg with PT overpressure. 97 deg aROM  -LS     STG 4  Patient will have R knee extension ROM </= 5 degrees for normalization of heel to toe gait mechanics  -LS     STG 4 Progress  Ongoing  -LS     STG 4 Progress Comments  lacking 8 degrees  -LS        Long Term Goals    LTG 1  Patient will be independent with established HEP for strength/stabilization to facilitate self management of condition  -LS     LTG 2  Patient will demonstrate nearly normal gait without AD to facilitate return to community activities  -LS     LTG 3  Patient will have hip/knee strength 4/5 to 4+/5 for increased ease/tolerance with prolonged WB activity   -LS     LTG 4  Patient will be able to go up/down stairs reciprocally with </= 1HR support and minimal pain/compensation for greater ease with community mobility.  -LS     LTG 5  Patient will have improved score on KOS Activities of Daily Living Scale from39/80 to >/= 53/80 demonstrating reduced functional limitations.   -       User Key  (r) = Recorded By, (t) = Taken By, (c) = Cosigned By    Initials Name Provider Type    Dona Azul, PT Physical Therapist                         Time Calculation:   Start Time: 1400  Stop Time: 1500  Time Calculation (min): 60 min  Total Timed Code Minutes- PT: 44 minute(s)  Therapy Suggested Charges     Code   Minutes Charges    81525 (CPT®) Hc Pt Neuromusc Re Education Ea 15 Min      52293 (CPT®) Hc Pt Ther Proc Ea 15 Min 44 3    90378 (CPT®) Hc Gait Training Ea 15 Min      47739 (CPT®) Hc Pt Therapeutic Act Ea 15 Min      06082 (CPT®) Hc Pt Manual Therapy Ea 15 Min 5     74972 (CPT®) Hc Pt Ther Massage- Per 15 Min      13260 (CPT®) Hc Pt Iontophoresis Ea 15 Min      13991 (CPT®) Hc Pt  Elec Stim Ea-Per 15 Min      05791 (CPT®) Hc Pt Ultrasound Ea 15 Min      74746 (CPT®) Hc Pt Self Care/Mgmt/Train Ea 15 Min      50858 (CPT®) Hc Pt Prosthetic (S) Train Initial Encounter, Each 15 Min      44613 (CPT®) Hc Orthotic(S) Mgmt/Train Initial Encounter, Each 15min      81389 (CPT®) Hc Pt Aquatic Therapy Ea 15 Min      84451 (CPT®) Hc Pt Orthotic(S)/Prosthetic(S) Encounter, Each 15 Min       (CPT®) Hc Pt Electrical Stim Unattended      Total  49 3        Therapy Charges for Today     Code Description Service Date Service Provider Modifiers Qty    71452483535 HC PT THER PROC EA 15 MIN 2/4/2019 Dona Gramajo, PT GP 3    15548504089 HC PT HOT OR COLD PACK TREAT MCARE 2/4/2019 Dona Gramajo, PT GP 1                    Dona Gramajo, PT  2/4/2019

## 2019-02-06 ENCOUNTER — HOSPITAL ENCOUNTER (OUTPATIENT)
Dept: PHYSICAL THERAPY | Facility: HOSPITAL | Age: 74
Setting detail: THERAPIES SERIES
Discharge: HOME | End: 2019-02-06

## 2019-02-06 DIAGNOSIS — M25.561 CHRONIC PAIN OF RIGHT KNEE: ICD-10-CM

## 2019-02-06 DIAGNOSIS — M25.561 ACUTE PAIN OF RIGHT KNEE: ICD-10-CM

## 2019-02-06 DIAGNOSIS — Z96.651 STATUS POST RIGHT KNEE REPLACEMENT: Primary | ICD-10-CM

## 2019-02-06 DIAGNOSIS — G89.29 CHRONIC PAIN OF RIGHT KNEE: ICD-10-CM

## 2019-02-06 DIAGNOSIS — Z47.89 ORTHOPEDIC AFTERCARE: ICD-10-CM

## 2019-02-06 DIAGNOSIS — R26.89 IMPAIRED GAIT AND MOBILITY: ICD-10-CM

## 2019-02-06 PROCEDURE — 97110 THERAPEUTIC EXERCISES: CPT

## 2019-02-06 NOTE — THERAPY TREATMENT NOTE
Outpatient Physical Therapy Ortho Treatment Note  [facility]     Patient Name: Елена Beach  : 1945  MRN: [id number]  Today's Date: 2019      Visit Date: 2019    Visit Dx:    ICD-10-CM ICD-9-CM   1. Status post right knee replacement Z96.651 V43.65   2. Acute pain of right knee M25.561 719.46   3. Orthopedic aftercare Z47.89 V54.9   4. Impaired gait and mobility R26.89 781.2   5. Chronic pain of right knee M25.561 719.46    G89.29 338.29       Patient Active Problem List   Diagnosis   • Fibromyalgia   • Arthritis   • Hiatal hernia   • Anxiety and depression   • Asthma   • Hyperlipidemia   • Iron deficiency anemia   • Chronic obstructive pulmonary disease (CMS/[facility])   • Chronic fatigue   • Snoring   • Dependence on nicotine from cigarettes   • Insomnia   • Osteopenia   • Right calf pain   • Shortness of breath   • Status post surgery        Past Medical History:   Diagnosis Date   • Anemia    • Arthritis    • Asthma    • Back pain    • Bursitis    • COPD (chronic obstructive pulmonary disease) (CMS/[facility])    • Fibromyalgia    • Fibromyositis    • GERD (gastroesophageal reflux disease)    • Hiatal hernia    • History of pneumonia    • Hyperlipidemia    • Knee pain    • Osteoarthritis    • Osteoporosis    • PONV (postoperative nausea and vomiting)    • Scoliosis         Past Surgical History:   Procedure Laterality Date   • COLONOSCOPY     • ENDOSCOPY     • FOOT SURGERY Right 2006   • HAND SURGERY     • SHOULDER ARTHROSCOPY     • SHOULDER SURGERY Left    • TOE SURGERY Right    • TOOTH EXTRACTION     • TOTAL KNEE ARTHROPLASTY Right 2018    Procedure: TOTAL KNEE ARTHROPLASTY;  Surgeon: Gerry Estevez MD;  Location: [facility] OR;  Service: Orthopedics   • TUBAL ABDOMINAL LIGATION     • WRIST SURGERY                         PT Assessment/Plan     Row Name 19 1233          PT Assessment    Assessment Comments  Slow progression with strengthening. Continue to  work on ROM with wall slide  -WS       User Key  (r) = Recorded By, (t) = Taken By, (c) = Cosigned By    Initials Name Provider Type    Chino Johnson PTA Physical Therapy Assistant          Modalities     Row Name 02/06/19 1145             Ice    Ice Applied  Yes  -WS      Location  R knee in supine, knee propped on pillow  -WS      Rx Minutes  10 mins  -WS      Ice S/P Rx  Yes  -WS        User Key  (r) = Recorded By, (t) = Taken By, (c) = Cosigned By    Initials Name Provider Type    Chino Johnson PTA Physical Therapy Assistant          Exercises     Row Name 02/06/19 1145             Subjective Comments    Subjective Comments  Having a rough day  -WS         Subjective Pain    Able to rate subjective pain?  yes  -WS      Pre-Treatment Pain Level  2  -WS         Total Minutes    04183 - PT Therapeutic Exercise Minutes  40  -WS         Exercise 1    Exercise Name 1  QS   -WS      Reps 1  15  -WS      Time 1  5  -WS      Additional Comments  seated into blue ball  -WS         Exercise 2    Exercise Name 2  SAQ  -WS      Sets 2  2  -WS      Reps 2  10  -WS      Additional Comments  4#  -WS         Exercise 3    Exercise Name 3  heel prop   -WS      Reps 3  15  -WS      Time 3  5  -WS         Exercise 4    Exercise Name 4  HR  -WS      Reps 4  20  -WS         Exercise 5    Exercise Name 5  HS curls  -WS      Sets 5  2  -WS      Reps 5  10  -WS      Additional Comments  2#  -WS         Exercise 6    Exercise Name 6  mini squats  -WS      Sets 6  2  -WS      Reps 6  10  -WS         Exercise 7    Exercise Name 7  standing hip abd  -WS      Reps 7  20  -WS      Additional Comments  2#  -WS         Exercise 8    Exercise Name 8  knee flexion stretch on stairs   -WS      Reps 8  3  -WS      Time 8  20s  -WS         Exercise 9    Exercise Name 9  NuStep UE/LE  - L5  -WS      Time 9  5 minutes  -WS         Exercise 10    Exercise Name 10  LAQ  -WS      Reps 10  15  -WS      Additional Comments  4#  -WS          Exercise 11    Exercise Name 11  standing TKE  -WS      Reps 11  10  -WS      Time 11  3  -WS      Additional Comments  GTB  -WS         Exercise 12    Exercise Name 12  wall side  -WS      Reps 12  10  -WS      Time 12  10  -WS         Exercise 14    Exercise Name 14  leg press  -WS      Sets 14  2  -WS      Reps 14  10  -WS      Additional Comments  65# BLE  -WS        User Key  (r) = Recorded By, (t) = Taken By, (c) = Cosigned By    Initials Name Provider Type    Chino Johnson PTA Physical Therapy Assistant                         PT OP Goals     Row Name 02/06/19 1200 02/06/19 1100       PT Short Term Goals    STG 1  Patient will be independent with initial HEP for ROM/flexibility without increased symptoms  -WS  Patient will be independent with initial HEP for ROM/flexibility without increased symptoms  -WS    STG 1 Progress  Met  -WS  Met  -WS    STG 2  Patient will be independent with education for symptom management, joint protection, and strategies to minimize stress on affected tissues.  -WS  Patient will be independent with education for symptom management, joint protection, and strategies to minimize stress on affected tissues.  -WS    STG 2 Progress  Met  -WS  Met  -WS    STG 3  Patient will have R knee flexion AROM >/= 105 degrees for increased ease/tolerance with ADL's/transitional movements  -WS  Patient will have R knee flexion AROM >/= 105 degrees for increased ease/tolerance with ADL's/transitional movements  -    STG 3 Progress  Ongoing  -WS  Ongoing  -WS    STG 4  Patient will have R knee extension ROM </= 5 degrees for normalization of heel to toe gait mechanics  -WS  Patient will have R knee extension ROM </= 5 degrees for normalization of heel to toe gait mechanics  -    STG 4 Progress  Ongoing  -WS  Ongoing  -       Long Term Goals    LTG 1  Patient will be independent with established HEP for strength/stabilization to facilitate self management of condition  -WS  Patient will  be independent with established HEP for strength/stabilization to facilitate self management of condition  -WS    LTG 2  Patient will demonstrate nearly normal gait without AD to facilitate return to community activities  -WS  Patient will demonstrate nearly normal gait without AD to facilitate return to community activities  -WS    LTG 3  Patient will have hip/knee strength 4/5 to 4+/5 for increased ease/tolerance with prolonged WB activity   -WS  Patient will have hip/knee strength 4/5 to 4+/5 for increased ease/tolerance with prolonged WB activity   -WS    LTG 4  Patient will be able to go up/down stairs reciprocally with </= 1HR support and minimal pain/compensation for greater ease with community mobility.  -WS  Patient will be able to go up/down stairs reciprocally with </= 1HR support and minimal pain/compensation for greater ease with community mobility.  -WS    LTG 5  Patient will have improved score on KOS Activities of Daily Living Scale from39/80 to >/= 53/80 demonstrating reduced functional limitations.   -WS  Patient will have improved score on KOS Activities of Daily Living Scale from39/80 to >/= 53/80 demonstrating reduced functional limitations.   -WS      User Key  (r) = Recorded By, (t) = Taken By, (c) = Cosigned By    Initials Name Provider Type    Chino Johnson PTA Physical Therapy Assistant          Therapy Education  Given: HEP, Symptoms/condition management  Program: Reinforced  How Provided: Verbal  Provided to: Patient              Time Calculation:   Start Time: 1145  Stop Time: 1235  Time Calculation (min): 50 min  Therapy Suggested Charges     Code   Minutes Charges    52421 (CPT®) Hc Pt Neuromusc Re Education Ea 15 Min      00766 (CPT®) Hc Pt Ther Proc Ea 15 Min 40 3    68475 (CPT®) Hc Gait Training Ea 15 Min      85702 (CPT®) Hc Pt Therapeutic Act Ea 15 Min      48977 (CPT®) Hc Pt Manual Therapy Ea 15 Min      33773 (CPT®) Hc Pt Ther Massage- Per 15 Min      13307 (CPT®) Hc Pt  Iontophoresis Ea 15 Min      59983 (CPT®) Hc Pt Elec Stim Ea-Per 15 Min      16272 (CPT®) Hc Pt Ultrasound Ea 15 Min      46930 (CPT®) Hc Pt Self Care/Mgmt/Train Ea 15 Min      74848 (CPT®) Hc Pt Prosthetic (S) Train Initial Encounter, Each 15 Min      49901 (CPT®) Hc Orthotic(S) Mgmt/Train Initial Encounter, Each 15min      72530 (CPT®) Hc Pt Aquatic Therapy Ea 15 Min      78093 (CPT®) Hc Pt Orthotic(S)/Prosthetic(S) Encounter, Each 15 Min       (CPT®) Hc Pt Electrical Stim Unattended      Total  40 3        Therapy Charges for Today     Code Description Service Date Service Provider Modifiers Qty    16965078691 HC PT THER PROC EA 15 MIN 2/6/2019 Chino Hein, PTA GP 3    98391763549 HC PT HOT OR COLD PACK TREAT [facility]ARE 2/6/2019 Chino Hein, PTA GP 1                    Chino Hein PTA  2/6/2019

## 2019-02-07 ENCOUNTER — HOSPITAL ENCOUNTER (OUTPATIENT)
Dept: PHYSICAL THERAPY | Facility: HOSPITAL | Age: 74
Setting detail: THERAPIES SERIES
Discharge: HOME | End: 2019-02-07

## 2019-02-07 DIAGNOSIS — Z47.89 ORTHOPEDIC AFTERCARE: ICD-10-CM

## 2019-02-07 DIAGNOSIS — M25.561 ACUTE PAIN OF RIGHT KNEE: ICD-10-CM

## 2019-02-07 DIAGNOSIS — R26.89 IMPAIRED GAIT AND MOBILITY: ICD-10-CM

## 2019-02-07 DIAGNOSIS — G89.29 CHRONIC PAIN OF RIGHT KNEE: ICD-10-CM

## 2019-02-07 DIAGNOSIS — Z96.651 STATUS POST RIGHT KNEE REPLACEMENT: Primary | ICD-10-CM

## 2019-02-07 DIAGNOSIS — M25.561 CHRONIC PAIN OF RIGHT KNEE: ICD-10-CM

## 2019-02-07 PROCEDURE — 97110 THERAPEUTIC EXERCISES: CPT

## 2019-02-07 NOTE — THERAPY TREATMENT NOTE
Outpatient Physical Therapy Ortho Treatment Note  [facility]     Patient Name: Елена Beach  : 1945  MRN: [id number]  Today's Date: 2019      Visit Date: 2019    Visit Dx:    ICD-10-CM ICD-9-CM   1. Status post right knee replacement Z96.651 V43.65   2. Acute pain of right knee M25.561 719.46   3. Orthopedic aftercare Z47.89 V54.9   4. Impaired gait and mobility R26.89 781.2   5. Chronic pain of right knee M25.561 719.46    G89.29 338.29       Patient Active Problem List   Diagnosis   • Fibromyalgia   • Arthritis   • Hiatal hernia   • Anxiety and depression   • Asthma   • Hyperlipidemia   • Iron deficiency anemia   • Chronic obstructive pulmonary disease (CMS/[facility])   • Chronic fatigue   • Snoring   • Dependence on nicotine from cigarettes   • Insomnia   • Osteopenia   • Right calf pain   • Shortness of breath   • Status post surgery        Past Medical History:   Diagnosis Date   • Anemia    • Arthritis    • Asthma    • Back pain    • Bursitis    • COPD (chronic obstructive pulmonary disease) (CMS/[facility])    • Fibromyalgia    • Fibromyositis    • GERD (gastroesophageal reflux disease)    • Hiatal hernia    • History of pneumonia    • Hyperlipidemia    • Knee pain    • Osteoarthritis    • Osteoporosis    • PONV (postoperative nausea and vomiting)    • Scoliosis         Past Surgical History:   Procedure Laterality Date   • COLONOSCOPY     • ENDOSCOPY     • FOOT SURGERY Right 2006   • HAND SURGERY     • SHOULDER ARTHROSCOPY     • SHOULDER SURGERY Left    • TOE SURGERY Right    • TOOTH EXTRACTION     • TOTAL KNEE ARTHROPLASTY Right 2018    Procedure: TOTAL KNEE ARTHROPLASTY;  Surgeon: Gerry Estevez MD;  Location: [facility] OR;  Service: Orthopedics   • TUBAL ABDOMINAL LIGATION     • WRIST SURGERY                         PT Assessment/Plan     Row Name 19 1202 19 1233       PT Assessment    Assessment Comments  Next visit add prone ext. Progress  strengthening  -WS  Slow progression with strengthening. Continue to work on ROM with wall slide  -WS      User Key  (r) = Recorded By, (t) = Taken By, (c) = Cosigned By    Initials Name Provider Type     Chino Hein PTA Physical Therapy Assistant          Modalities     Row Name 02/07/19 1115             Ice    Ice Applied  Yes  -WS      Location  R knee in supine, knee propped on pillow  -WS      Rx Minutes  10 mins  -WS      Ice S/P Rx  Yes  -WS        User Key  (r) = Recorded By, (t) = Taken By, (c) = Cosigned By    Initials Name Provider Type     Chino Hein PTA Physical Therapy Assistant          Exercises     Row Name 02/07/19 1115             Subjective Pain    Able to rate subjective pain?  yes  -WS         Total Minutes    32638 - PT Therapeutic Exercise Minutes  40  -WS         Exercise 1    Exercise Name 1  QS   -WS      Reps 1  15  -WS      Time 1  5  -WS      Additional Comments  seated blue ball  -WS         Exercise 2    Exercise Name 2  SAQ  -WS      Sets 2  2  -WS      Reps 2  10  -WS      Additional Comments  4#  -WS         Exercise 3    Exercise Name 3  heel prop   -WS      Cueing 3  Verbal  -WS      Reps 3  15  -WS      Time 3  5  -WS      Additional Comments  prone next   -WS         Exercise 4    Exercise Name 4  HR  -WS      Reps 4  20  -WS         Exercise 5    Exercise Name 5  HS curls  -WS      Sets 5  2  -WS      Reps 5  10  -WS      Additional Comments  2#  -WS         Exercise 6    Exercise Name 6  mini squats  -WS      Sets 6  2  -WS      Reps 6  10  -WS         Exercise 7    Exercise Name 7  standing hip abd  -WS      Reps 7  20  -WS      Additional Comments  2#  -WS         Exercise 8    Exercise Name 8  knee flexion stretch on stairs   -WS      Reps 8  3  -WS      Time 8  20s  -WS         Exercise 9    Exercise Name 9  NuStep UE/LE  - L5  -WS      Time 9  5 minutes  -WS         Exercise 10    Exercise Name 10  LAQ  -WS      Reps 10  15  -WS      Additional  Comments  4#  -WS         Exercise 11    Exercise Name 11  standing TKE  -WS      Reps 11  10  -WS      Time 11  3  -WS      Additional Comments  GTB  -WS         Exercise 12    Exercise Name 12  wall side  -WS      Reps 12  10  -WS      Time 12  10  -WS         Exercise 14    Exercise Name 14  leg press  -WS      Sets 14  2  -WS      Reps 14  10  -WS      Additional Comments  65# BLE  -WS        User Key  (r) = Recorded By, (t) = Taken By, (c) = Cosigned By    Initials Name Provider Type    Chino Johnson PTA Physical Therapy Assistant                         PT OP Goals     Row Name 02/07/19 1200          PT Short Term Goals    STG 1  Patient will be independent with initial HEP for ROM/flexibility without increased symptoms  -     STG 1 Progress  Met  -     STG 2  Patient will be independent with education for symptom management, joint protection, and strategies to minimize stress on affected tissues.  -     STG 2 Progress  Met  -     STG 3  Patient will have R knee flexion AROM >/= 105 degrees for increased ease/tolerance with ADL's/transitional movements  -     STG 3 Progress  Ongoing  -     STG 4  Patient will have R knee extension ROM </= 5 degrees for normalization of heel to toe gait mechanics  -     STG 4 Progress  Ongoing  -        Long Term Goals    LTG 1  Patient will be independent with established HEP for strength/stabilization to facilitate self management of condition  -     LTG 2  Patient will demonstrate nearly normal gait without AD to facilitate return to community activities  -     LTG 3  Patient will have hip/knee strength 4/5 to 4+/5 for increased ease/tolerance with prolonged WB activity   -     LTG 4  Patient will be able to go up/down stairs reciprocally with </= 1HR support and minimal pain/compensation for greater ease with community mobility.  -     LTG 5  Patient will have improved score on KOS Activities of Daily Living Scale from39/80 to >/= 53/80  demonstrating reduced functional limitations.   -VINICIUS       User Key  (r) = Recorded By, (t) = Taken By, (c) = Cosigned By    Initials Name Provider Type    Chino Johnson PTA Physical Therapy Assistant          Therapy Education  Given: HEP  Program: Reinforced  How Provided: Verbal  Provided to: Patient              Time Calculation:   Start Time: 1115  Stop Time: 1205  Time Calculation (min): 50 min  Therapy Suggested Charges     Code   Minutes Charges    53848 (CPT®) Hc Pt Neuromusc Re Education Ea 15 Min      37012 (CPT®) Hc Pt Ther Proc Ea 15 Min 40 3    07240 (CPT®) Hc Gait Training Ea 15 Min      18182 (CPT®) Hc Pt Therapeutic Act Ea 15 Min      72498 (CPT®) Hc Pt Manual Therapy Ea 15 Min      57388 (CPT®) Hc Pt Ther Massage- Per 15 Min      01320 (CPT®) Hc Pt Iontophoresis Ea 15 Min      02020 (CPT®) Hc Pt Elec Stim Ea-Per 15 Min      25782 (CPT®) Hc Pt Ultrasound Ea 15 Min      43894 (CPT®) Hc Pt Self Care/Mgmt/Train Ea 15 Min      03520 (CPT®) Hc Pt Prosthetic (S) Train Initial Encounter, Each 15 Min      83951 (CPT®) Hc Orthotic(S) Mgmt/Train Initial Encounter, Each 15min      38522 (CPT®) Hc Pt Aquatic Therapy Ea 15 Min      15877 (CPT®) Hc Pt Orthotic(S)/Prosthetic(S) Encounter, Each 15 Min       (CPT®) Hc Pt Electrical Stim Unattended      Total  40 3        Therapy Charges for Today     Code Description Service Date Service Provider Modifiers Qty    38775343086 HC PT THER PROC EA 15 MIN 2/7/2019 Chino Hein PTA GP 3    09845816356 HC PT HOT OR COLD PACK TREAT [facility] 2/7/2019 Chino Hein PTA GP 1                    Chino Hein PTA  2/7/2019

## 2019-02-11 ENCOUNTER — HOSPITAL ENCOUNTER (OUTPATIENT)
Dept: PHYSICAL THERAPY | Facility: HOSPITAL | Age: 74
Setting detail: THERAPIES SERIES
Discharge: HOME | End: 2019-02-11

## 2019-02-11 DIAGNOSIS — Z47.89 ORTHOPEDIC AFTERCARE: ICD-10-CM

## 2019-02-11 DIAGNOSIS — M25.561 ACUTE PAIN OF RIGHT KNEE: ICD-10-CM

## 2019-02-11 DIAGNOSIS — M25.561 CHRONIC PAIN OF RIGHT KNEE: ICD-10-CM

## 2019-02-11 DIAGNOSIS — G89.29 CHRONIC PAIN OF RIGHT KNEE: ICD-10-CM

## 2019-02-11 DIAGNOSIS — Z96.651 STATUS POST RIGHT KNEE REPLACEMENT: Primary | ICD-10-CM

## 2019-02-11 DIAGNOSIS — R26.89 IMPAIRED GAIT AND MOBILITY: ICD-10-CM

## 2019-02-11 PROCEDURE — 97110 THERAPEUTIC EXERCISES: CPT

## 2019-02-11 NOTE — THERAPY TREATMENT NOTE
Outpatient Physical Therapy Ortho Treatment Note  [facility]     Patient Name: Елена Beach  : 1945  MRN: [id number]  Today's Date: 2019      Visit Date: 2019    Visit Dx:    ICD-10-CM ICD-9-CM   1. Status post right knee replacement Z96.651 V43.65   2. Acute pain of right knee M25.561 719.46   3. Orthopedic aftercare Z47.89 V54.9   4. Impaired gait and mobility R26.89 781.2   5. Chronic pain of right knee M25.561 719.46    G89.29 338.29       Patient Active Problem List   Diagnosis   • Fibromyalgia   • Arthritis   • Hiatal hernia   • Anxiety and depression   • Asthma   • Hyperlipidemia   • Iron deficiency anemia   • Chronic obstructive pulmonary disease (CMS/[facility])   • Chronic fatigue   • Snoring   • Dependence on nicotine from cigarettes   • Insomnia   • Osteopenia   • Right calf pain   • Shortness of breath   • Status post surgery        Past Medical History:   Diagnosis Date   • Anemia    • Arthritis    • Asthma    • Back pain    • Bursitis    • COPD (chronic obstructive pulmonary disease) (CMS/[facility])    • Fibromyalgia    • Fibromyositis    • GERD (gastroesophageal reflux disease)    • Hiatal hernia    • History of pneumonia    • Hyperlipidemia    • Knee pain    • Osteoarthritis    • Osteoporosis    • PONV (postoperative nausea and vomiting)    • Scoliosis         Past Surgical History:   Procedure Laterality Date   • COLONOSCOPY     • ENDOSCOPY     • FOOT SURGERY Right 2006   • HAND SURGERY     • SHOULDER ARTHROSCOPY     • SHOULDER SURGERY Left    • TOE SURGERY Right    • TOOTH EXTRACTION     • TOTAL KNEE ARTHROPLASTY Right 2018    Procedure: TOTAL KNEE ARTHROPLASTY;  Surgeon: Gerry Estevez MD;  Location: [facility] OR;  Service: Orthopedics   • TUBAL ABDOMINAL LIGATION     • WRIST SURGERY                         PT Assessment/Plan     Row Name 19 1826          PT Assessment    Assessment Comments  A/P flex 100/110. Added prone extension.  Increased weight with several exercises  -WS       User Key  (r) = Recorded By, (t) = Taken By, (c) = Cosigned By    Initials Name Provider Type    WS Chino Hein PTA Physical Therapy Assistant              Exercises     Row Name 02/11/19 3621             Subjective Comments    Subjective Comments  No pain in right knee  -WS         Subjective Pain    Able to rate subjective pain?  yes  -WS      Pre-Treatment Pain Level  0  -WS         Total Minutes    83245 - PT Therapeutic Exercise Minutes  40  -WS         Exercise 1    Exercise Name 1  QS   -WS      Reps 1  15  -WS      Time 1  5  -WS      Additional Comments  seated blue ball  -WS         Exercise 2    Exercise Name 2  SAQ  -WS      Sets 2  2  -WS      Reps 2  10  -WS         Exercise 3    Exercise Name 3  prone  -WS      Cueing 3  Verbal  -WS      Time 3  1 min  -WS         Exercise 4    Exercise Name 4  HR  -WS      Reps 4  20  -WS         Exercise 5    Exercise Name 5  HS curls  -WS      Sets 5  2  -WS      Reps 5  10  -WS      Additional Comments  3#  -WS         Exercise 6    Exercise Name 6  mini squats  -WS      Sets 6  2  -WS      Reps 6  10  -WS         Exercise 7    Exercise Name 7  standing hip abd  -WS      Reps 7  20  -WS      Additional Comments  3#  -WS         Exercise 8    Exercise Name 8  knee flexion stretch on stairs   -WS      Reps 8  3  -WS      Time 8  20s  -WS         Exercise 9    Exercise Name 9  NuStep UE/LE  - L5  -WS      Time 9  5 minutes  -WS         Exercise 10    Exercise Name 10  LAQ  -WS      Reps 10  15  -WS         Exercise 11    Exercise Name 11  standing TKE  -WS      Reps 11  10  -WS      Time 11  3  -WS      Additional Comments  GTB  -WS         Exercise 12    Exercise Name 12  wall side  -WS      Reps 12  10  -WS      Time 12  10  -WS         Exercise 14    Exercise Name 14  leg press  -WS      Sets 14  2  -WS      Reps 14  10  -WS      Additional Comments  70# BLE  -WS        User Key  (r) = Recorded By, (t) =  Taken By, (c) = Cosigned By    Initials Name Provider Type    Chino Johnson PTA Physical Therapy Assistant                         PT OP Goals     Row Name 02/11/19 1800          PT Short Term Goals    STG 1  Patient will be independent with initial HEP for ROM/flexibility without increased symptoms  -WS     STG 1 Progress  Met  -WS     STG 2  Patient will be independent with education for symptom management, joint protection, and strategies to minimize stress on affected tissues.  -WS     STG 2 Progress  Met  -WS     STG 3  Patient will have R knee flexion AROM >/= 105 degrees for increased ease/tolerance with ADL's/transitional movements  -WS     STG 3 Progress  Ongoing  -WS     STG 3 Progress Comments  active flex 100  -WS     STG 4  Patient will have R knee extension ROM </= 5 degrees for normalization of heel to toe gait mechanics  -WS     STG 4 Progress  Ongoing  -        Long Term Goals    LTG 1  Patient will be independent with established HEP for strength/stabilization to facilitate self management of condition  -WS     LTG 2  Patient will demonstrate nearly normal gait without AD to facilitate return to community activities  -     LTG 3  Patient will have hip/knee strength 4/5 to 4+/5 for increased ease/tolerance with prolonged WB activity   -WS     LTG 4  Patient will be able to go up/down stairs reciprocally with </= 1HR support and minimal pain/compensation for greater ease with community mobility.  -     LTG 5  Patient will have improved score on KOS Activities of Daily Living Scale from39/80 to >/= 53/80 demonstrating reduced functional limitations.   -       User Key  (r) = Recorded By, (t) = Taken By, (c) = Cosigned By    Initials Name Provider Type    Chino Johnson PTA Physical Therapy Assistant          Therapy Education  Given: HEP, Symptoms/condition management, Pain management  Program: Reinforced  How Provided: Verbal  Provided to: Patient              Time Calculation:    Start Time: 1740  Stop Time: 1820  Time Calculation (min): 40 min  Therapy Suggested Charges     Code   Minutes Charges    66039 (CPT®) Hc Pt Neuromusc Re Education Ea 15 Min      43304 (CPT®) Hc Pt Ther Proc Ea 15 Min 40 3    96968 (CPT®) Hc Gait Training Ea 15 Min      93144 (CPT®) Hc Pt Therapeutic Act Ea 15 Min      65371 (CPT®) Hc Pt Manual Therapy Ea 15 Min      30869 (CPT®) Hc Pt Ther Massage- Per 15 Min      35966 (CPT®) Hc Pt Iontophoresis Ea 15 Min      50302 (CPT®) Hc Pt Elec Stim Ea-Per 15 Min      96410 (CPT®) Hc Pt Ultrasound Ea 15 Min      72071 (CPT®) Hc Pt Self Care/Mgmt/Train Ea 15 Min      62962 (CPT®) Hc Pt Prosthetic (S) Train Initial Encounter, Each 15 Min      20485 (CPT®) Hc Orthotic(S) Mgmt/Train Initial Encounter, Each 15min      47018 (CPT®) Hc Pt Aquatic Therapy Ea 15 Min      25727 (CPT®) Hc Pt Orthotic(S)/Prosthetic(S) Encounter, Each 15 Min       (CPT®) Hc Pt Electrical Stim Unattended      Total  40 3        Therapy Charges for Today     Code Description Service Date Service Provider Modifiers Qty    05258219217 HC PT THER PROC EA 15 MIN 2/11/2019 Chino Hein, PTA GP 3                    Chino Hein PTA  2/11/2019

## 2019-02-12 ENCOUNTER — OFFICE VISIT (OUTPATIENT)
Dept: FAMILY MEDICINE CLINIC | Facility: CLINIC | Age: 74
End: 2019-02-12

## 2019-02-12 VITALS
HEIGHT: 62 IN | OXYGEN SATURATION: 96 % | HEART RATE: 93 BPM | WEIGHT: 109.8 LBS | BODY MASS INDEX: 20.2 KG/M2 | DIASTOLIC BLOOD PRESSURE: 80 MMHG | TEMPERATURE: 97.5 F | SYSTOLIC BLOOD PRESSURE: 110 MMHG

## 2019-02-12 DIAGNOSIS — M79.7 FIBROMYALGIA: ICD-10-CM

## 2019-02-12 DIAGNOSIS — J44.9 CHRONIC OBSTRUCTIVE PULMONARY DISEASE, UNSPECIFIED COPD TYPE (HCC): ICD-10-CM

## 2019-02-12 DIAGNOSIS — E78.5 HYPERLIPIDEMIA, UNSPECIFIED HYPERLIPIDEMIA TYPE: Primary | ICD-10-CM

## 2019-02-12 PROCEDURE — 99214 OFFICE O/P EST MOD 30 MIN: CPT | Performed by: NURSE PRACTITIONER

## 2019-02-12 RX ORDER — ATORVASTATIN CALCIUM 10 MG/1
10 TABLET, FILM COATED ORAL DAILY
Qty: 90 TABLET | Refills: 3 | Status: SHIPPED | OUTPATIENT
Start: 2019-02-12 | End: 2020-02-17

## 2019-02-12 RX ORDER — DULOXETIN HYDROCHLORIDE 60 MG/1
60 CAPSULE, DELAYED RELEASE ORAL DAILY
Qty: 90 CAPSULE | Refills: 3 | Status: SHIPPED | OUTPATIENT
Start: 2019-02-12 | End: 2020-06-25

## 2019-02-12 NOTE — PROGRESS NOTES
"Subjective   Елена Beach is a 73 y.o. female.     History of Present Illness   Patient presenting to the office today for refills of her cholesterol medication which she's taking daily esterase any side effects she's not had her labs checked since February 2018 but she is not fasting today.    She's also here to get a refill her Cymbalta which she's using daily as directed at any side effects and is working well to control all her symptoms of fibromyalgia.    She is expressing some shortness of breath at her last visit I did a Doppler of her leg which was normal CT to rule out PE which was also normal she is currently seeing a pulmonologist and onto maintenance inhalers I offered to adjust these inhalers for her today but she said that she would rather go to her pulmonologist.  A chest pain or shortness of breath with exertion.  She feels like she can't catch her breath.  The following portions of the patient's history were reviewed and updated as appropriate: allergies, current medications, past social history and problem list.    Review of Systems   All other systems reviewed and are negative.      Objective   /80 (BP Location: Right arm, Patient Position: Sitting)   Pulse 93   Temp 97.5 °F (36.4 °C)   Ht 157.5 cm (62.01\")   Wt 49.8 kg (109 lb 12.8 oz)   SpO2 96%   BMI 20.08 kg/m²   Physical Exam   Constitutional: She is oriented to person, place, and time. Vital signs are normal. She appears well-developed and well-nourished. No distress.   HENT:   Head: Normocephalic.   Cardiovascular: Normal rate, regular rhythm and normal heart sounds.   Pulmonary/Chest: Effort normal and breath sounds normal.   Neurological: She is alert and oriented to person, place, and time. Gait normal.   Psychiatric: She has a normal mood and affect. Her behavior is normal. Judgment and thought content normal.   Vitals reviewed.      Assessment/Plan      Diagnosis Plan   1. Hyperlipidemia, unspecified hyperlipidemia type  " atorvastatin (LIPITOR) 10 MG tablet   2. Fibromyalgia  DULoxetine (CYMBALTA) 60 MG capsule   3. Chronic obstructive pulmonary disease, unspecified COPD type (CMS/HCC)       If he cannot get her pulmonologist please return to the office so medications can be adjusted  Caleb Velez, BEATRIS  2/12/2019

## 2019-02-13 ENCOUNTER — HOSPITAL ENCOUNTER (OUTPATIENT)
Dept: PHYSICAL THERAPY | Facility: HOSPITAL | Age: 74
Setting detail: THERAPIES SERIES
Discharge: HOME | End: 2019-02-13

## 2019-02-13 DIAGNOSIS — G89.29 CHRONIC PAIN OF RIGHT KNEE: ICD-10-CM

## 2019-02-13 DIAGNOSIS — M25.561 ACUTE PAIN OF RIGHT KNEE: ICD-10-CM

## 2019-02-13 DIAGNOSIS — M25.561 CHRONIC PAIN OF RIGHT KNEE: ICD-10-CM

## 2019-02-13 DIAGNOSIS — Z96.651 STATUS POST RIGHT KNEE REPLACEMENT: Primary | ICD-10-CM

## 2019-02-13 DIAGNOSIS — R26.89 IMPAIRED GAIT AND MOBILITY: ICD-10-CM

## 2019-02-13 DIAGNOSIS — Z47.89 ORTHOPEDIC AFTERCARE: ICD-10-CM

## 2019-02-13 PROCEDURE — 97110 THERAPEUTIC EXERCISES: CPT

## 2019-02-14 NOTE — THERAPY PROGRESS REPORT/RE-CERT
Outpatient Physical Therapy Ortho Progress Note  [facility]     Patient Name: Елена Beach  : 1945  MRN: [id number]  Today's Date: 2019      Visit Date: 2019    Patient Active Problem List   Diagnosis   • Fibromyalgia   • Arthritis   • Hiatal hernia   • Anxiety and depression   • Asthma   • Hyperlipidemia   • Iron deficiency anemia   • Chronic obstructive pulmonary disease (CMS/HCC)   • Chronic fatigue   • Snoring   • Dependence on nicotine from cigarettes   • Insomnia   • Osteopenia   • Right calf pain   • Shortness of breath   • Status post surgery        Past Medical History:   Diagnosis Date   • Anemia    • Arthritis    • Asthma    • Back pain    • Bursitis    • COPD (chronic obstructive pulmonary disease) (CMS/HCC)    • Fibromyalgia    • Fibromyositis    • GERD (gastroesophageal reflux disease)    • Hiatal hernia    • History of pneumonia    • Hyperlipidemia    • Knee pain    • Osteoarthritis    • Osteoporosis    • PONV (postoperative nausea and vomiting)    • Scoliosis         Past Surgical History:   Procedure Laterality Date   • COLONOSCOPY     • ENDOSCOPY     • FOOT SURGERY Right 2006   • HAND SURGERY     • SHOULDER ARTHROSCOPY     • SHOULDER SURGERY Left    • TOE SURGERY Right    • TOOTH EXTRACTION     • TOTAL KNEE ARTHROPLASTY Right 2018    Procedure: TOTAL KNEE ARTHROPLASTY;  Surgeon: Gerry Estevez MD;  Location: [facility] OR;  Service: Orthopedics   • TUBAL ABDOMINAL LIGATION     • WRIST SURGERY         Visit Dx:     ICD-10-CM ICD-9-CM   1. Status post right knee replacement Z96.651 V43.65   2. Acute pain of right knee M25.561 719.46   3. Orthopedic aftercare Z47.89 V54.9   4. Impaired gait and mobility R26.89 781.2   5. Chronic pain of right knee M25.561 719.46    G89.29 338.29                           Therapy Education  Education Details: strong reinforcement of HEP, explained ROM goals and current progress, encouraged improved gait  mechanics  Given: Symptoms/condition management, HEP  Program: Reinforced  How Provided: Verbal, Demonstration  Provided to: Patient  Level of Understanding: Teach back education performed, Verbalized, Demonstrated     PT OP Goals     Row Name 02/13/19 1400          PT Short Term Goals    STG 1  Patient will be independent with initial HEP for ROM/flexibility without increased symptoms  -LS     STG 1 Progress  Met  -LS     STG 2  Patient will be independent with education for symptom management, joint protection, and strategies to minimize stress on affected tissues.  -LS     STG 2 Progress  Met  -LS     STG 3  Patient will have R knee flexion AROM >/= 105 degrees for increased ease/tolerance with ADL's/transitional movements  -LS     STG 3 Progress  Met  -LS     STG 3 Progress Comments  5-105 deg in supine position  -LS     STG 4  Patient will have R knee extension ROM </= 5 degrees for normalization of heel to toe gait mechanics  -LS     STG 4 Progress  Met  -LS        Long Term Goals    LTG 1  Patient will be independent with established HEP for strength/stabilization to facilitate self management of condition  -LS     LTG 1 Progress  Ongoing  -LS     LTG 1 Progress Comments  Pt with limited compliance with daily HEP.  -LS     LTG 2  Patient will demonstrate nearly normal gait without AD to facilitate return to community activities  -LS     LTG 2 Progress  Ongoing  -LS     LTG 2 Progress Comments  Pt continue to demonstrate reduced TKE RLE  -LS     LTG 3  Patient will have hip/knee strength 4/5 to 4+/5 for increased ease/tolerance with prolonged WB activity   -LS     LTG 3 Progress  Ongoing  -LS     LTG 3 Progress Comments  grossly 4/5  -LS     LTG 4  Patient will be able to go up/down stairs reciprocally with </= 1HR support and minimal pain/compensation for greater ease with community mobility.  -LS     LTG 4 Progress  Ongoing  -LS     LTG 4 Progress Comments  Pt demonstrating ability to perform repeated step  up x 10 with RLE. Has not attempted stair negotiation.  -     LTG 5  Patient will have improved score on KOS Activities of Daily Living Scale from39/80 to >/= 53/80 demonstrating reduced functional limitations.   -     LTG 5 Progress  Met  -     LTG 5 Progress Comments  56/80 which is 30% disability  -       User Key  (r) = Recorded By, (t) = Taken By, (c) = Cosigned By    Initials Name Provider Type    LS Dona Gramajo, PT Physical Therapist          PT Assessment/Plan     Row Name 02/13/19 3485          PT Assessment    Functional Limitations  Impaired gait;Limitations in community activities;Performance in leisure activities;Impaired locomotion  -     Impairments  Pain;Muscle strength;Posture;Impaired flexibility;Gait;Balance;Endurance  -     Assessment Comments  Pt has participated in 12 skilled PT sessions s/p R TKR 12/27/18 performed by Dr. Estevez. She demonstrates improving ROM  and strength with RLE AROM 5-105 deg and strength grossly 4/5. SHe demonstrates antalgic gait pattern with dec R TKE and flatfoot contact. Her pain is well controlled and she is improving her tolerance to ambulation and work duties. Her self reported KOS disability score has reduced to 30% disability. She will continue to benefit from skilled PT services to improve her AROM, reduce gait abnormalities, and improve her ability to negotiate stairs reciprocally.   -     Please refer to paper survey for additional self-reported information  Yes  -LS     Rehab Potential  Good  -LS     Patient/caregiver participated in establishment of treatment plan and goals  Yes  -LS     Patient would benefit from skilled therapy intervention  Yes  -LS        PT Plan    PT Frequency  2x/week  -LS     Predicted Duration of Therapy Intervention (Therapy Eval)  2 weeks   -LS     Planned CPT's?  PT EVAL LOW COMPLEXITY: 89215;PT THER PROC EA 15 MIN: 89282;PT MANUAL THERAPY EA 15 MIN: 64437;PT GAIT TRAINING EA 15 MIN: 98116;PT HOT OR COLD PACK  TREAT MCARE;PT HOT/COLD PACK WC NONMCARE: 54099;PT THER ACT EA 15 MIN: 32660;PT RE-EVAL: 78886;PT NEUROMUSC RE-EDUCATION EA 15 MIN: 70051;PT ELECTRICAL STIM UNATTEND:   -LS     PT Plan Comments  Continue ROM, strengthening, gait training. Progress to full revolution on recumbent bike.   -LS       User Key  (r) = Recorded By, (t) = Taken By, (c) = Cosigned By    Initials Name Provider Type    Dona Azul, PT Physical Therapist          Modalities     Row Name 02/13/19 1400             Ice    Ice Applied  Yes  -LS      Location  R knee in HL  -LS      Rx Minutes  10 mins  -LS      Ice S/P Rx  Yes  -LS        User Key  (r) = Recorded By, (t) = Taken By, (c) = Cosigned By    Initials Name Provider Type    LS Dona Gramajo, PT Physical Therapist        Exercises     Row Name 02/13/19 1400             Subjective Comments    Subjective Comments  I am doing fine. I am having trouble breathing.  -LS         Subjective Pain    Able to rate subjective pain?  yes  -LS      Pre-Treatment Pain Level  0  -LS         Total Minutes    50654 - PT Therapeutic Exercise Minutes  43  -LS         Exercise 1    Exercise Name 1  QS   -LS      Reps 1  15  -LS      Time 1  5  -LS      Additional Comments  seated blue ball  -LS         Exercise 3    Exercise Name 3  heel prop with quad set  -LS      Reps 3  15  -LS      Time 3  5  -LS         Exercise 8    Exercise Name 8  knee flexion stretch  -LS      Reps 8  3  -LS      Time 8  20s  -LS         Exercise 9    Exercise Name 9  NuStep UE/LE  - L5  -LS      Time 9  5 minutes  -LS         Exercise 11    Exercise Name 11  standing TKE  -LS      Reps 11  20  -LS      Time 11  3  -LS      Additional Comments  10 with band; 10 without  -LS         Exercise 12    Exercise Name 12  wall slide  -LS      Reps 12  10  -LS      Time 12  10  -LS         Exercise 13    Exercise Name 13  recumbent bike  -LS      Time 13  5 minutes   -LS      Additional Comments  without full revolution  -LS       "   Exercise 14    Exercise Name 14  leg press  -LS      Sets 14  3  -LS      Reps 14  15  -LS      Additional Comments  70# BLE  -LS         Exercise 15    Exercise Name 15  step up 6\"  -LS      Reps 15  15  -LS      Additional Comments  RLE  -LS         Exercise 16    Exercise Name 16  step down 6\"  -LS      Reps 16  10  -LS      Additional Comments  LLE step down  -LS        User Key  (r) = Recorded By, (t) = Taken By, (c) = Cosigned By    Initials Name Provider Type    LS Dona Gramajo, PT Physical Therapist                        Outcome Measure Options: Knee Outcome Score- ADL  Knee Outcome Score  Knee Outcome Score Comments: 56/80(30% disability)      Time Calculation:     Therapy Suggested Charges     Code   Minutes Charges    33642 (CPT®) Hc Pt Neuromusc Re Education Ea 15 Min      64571 (CPT®) Hc Pt Ther Proc Ea 15 Min 43 3    80996 (CPT®) Hc Gait Training Ea 15 Min      30578 (CPT®) Hc Pt Therapeutic Act Ea 15 Min      24669 (CPT®) Hc Pt Manual Therapy Ea 15 Min      81099 (CPT®) Hc Pt Ther Massage- Per 15 Min      22546 (CPT®) Hc Pt Iontophoresis Ea 15 Min      78693 (CPT®) Hc Pt Elec Stim Ea-Per 15 Min      10163 (CPT®) Hc Pt Ultrasound Ea 15 Min      38651 (CPT®) Hc Pt Self Care/Mgmt/Train Ea 15 Min      63619 (CPT®) Hc Pt Prosthetic (S) Train Initial Encounter, Each 15 Min      90622 (CPT®) Hc Orthotic(S) Mgmt/Train Initial Encounter, Each 15min      94580 (CPT®) Hc Pt Aquatic Therapy Ea 15 Min      76075 (CPT®) Hc Pt Orthotic(S)/Prosthetic(S) Encounter, Each 15 Min       (CPT®) Hc Pt Electrical Stim Unattended      Total  43 3          Start Time: 1400  Stop Time: 1500  Time Calculation (min): 60 min  Total Timed Code Minutes- PT: 44 minute(s)     Therapy Charges for Today     Code Description Service Date Service Provider Modifiers Qty    31785220407 HC PT THER PROC EA 15 MIN 2/13/2019 Dona Gramajo, PT GP 3    90115697729 HC PT HOT OR COLD PACK TREAT MCARE 2/13/2019 Dona Gramajo, PT GP 1    "       PT G-Codes  Outcome Measure Options: Knee Outcome Score- ADL         Dona Gramajo, PT  2/13/2019

## 2019-02-15 ENCOUNTER — HOSPITAL ENCOUNTER (OUTPATIENT)
Dept: PHYSICAL THERAPY | Facility: HOSPITAL | Age: 74
Setting detail: THERAPIES SERIES
Discharge: HOME | End: 2019-02-15

## 2019-02-15 ENCOUNTER — OFFICE VISIT (OUTPATIENT)
Dept: ORTHOPEDIC SURGERY | Facility: CLINIC | Age: 74
End: 2019-02-15

## 2019-02-15 VITALS — TEMPERATURE: 97.9 F | BODY MASS INDEX: 19.51 KG/M2 | WEIGHT: 106 LBS | HEIGHT: 62 IN

## 2019-02-15 DIAGNOSIS — G89.29 CHRONIC PAIN OF RIGHT KNEE: ICD-10-CM

## 2019-02-15 DIAGNOSIS — Z96.651 S/P TOTAL KNEE ARTHROPLASTY, RIGHT: Primary | ICD-10-CM

## 2019-02-15 DIAGNOSIS — M25.561 CHRONIC PAIN OF RIGHT KNEE: ICD-10-CM

## 2019-02-15 DIAGNOSIS — R26.89 IMPAIRED GAIT AND MOBILITY: ICD-10-CM

## 2019-02-15 DIAGNOSIS — Z47.89 ORTHOPEDIC AFTERCARE: ICD-10-CM

## 2019-02-15 DIAGNOSIS — Z96.651 STATUS POST RIGHT KNEE REPLACEMENT: Primary | ICD-10-CM

## 2019-02-15 DIAGNOSIS — M25.561 ACUTE PAIN OF RIGHT KNEE: ICD-10-CM

## 2019-02-15 PROCEDURE — 73562 X-RAY EXAM OF KNEE 3: CPT | Performed by: ORTHOPAEDIC SURGERY

## 2019-02-15 PROCEDURE — 99024 POSTOP FOLLOW-UP VISIT: CPT | Performed by: ORTHOPAEDIC SURGERY

## 2019-02-15 PROCEDURE — 97110 THERAPEUTIC EXERCISES: CPT

## 2019-02-15 NOTE — THERAPY TREATMENT NOTE
Outpatient Physical Therapy Ortho Treatment Note  [facility]     Patient Name: Елена Beach  : 1945  MRN: [id number]  Today's Date: 2/15/2019      Visit Date: 02/15/2019    Visit Dx:    ICD-10-CM ICD-9-CM   1. Status post right knee replacement Z96.651 V43.65   2. Acute pain of right knee M25.561 719.46   3. Orthopedic aftercare Z47.89 V54.9   4. Impaired gait and mobility R26.89 781.2   5. Chronic pain of right knee M25.561 719.46    G89.29 338.29       Patient Active Problem List   Diagnosis   • Fibromyalgia   • Arthritis   • Hiatal hernia   • Anxiety and depression   • Asthma   • Hyperlipidemia   • Iron deficiency anemia   • Chronic obstructive pulmonary disease (CMS/[facility])   • Chronic fatigue   • Snoring   • Dependence on nicotine from cigarettes   • Insomnia   • Osteopenia   • Right calf pain   • Shortness of breath   • Status post surgery        Past Medical History:   Diagnosis Date   • Anemia    • Arthritis    • Asthma    • Back pain    • Bursitis    • COPD (chronic obstructive pulmonary disease) (CMS/[facility])    • Fibromyalgia    • Fibromyositis    • GERD (gastroesophageal reflux disease)    • Hiatal hernia    • History of pneumonia    • Hyperlipidemia    • Knee pain    • Osteoarthritis    • Osteoporosis    • PONV (postoperative nausea and vomiting)    • Scoliosis         Past Surgical History:   Procedure Laterality Date   • COLONOSCOPY     • ENDOSCOPY     • FOOT SURGERY Right 2006   • HAND SURGERY     • SHOULDER ARTHROSCOPY     • SHOULDER SURGERY Left    • TOE SURGERY Right    • TOOTH EXTRACTION     • TOTAL KNEE ARTHROPLASTY Right 2018    Procedure: TOTAL KNEE ARTHROPLASTY;  Surgeon: Gerry Estevez MD;  Location: [facility] OR;  Service: Orthopedics   • TUBAL ABDOMINAL LIGATION     • WRIST SURGERY                         PT Assessment/Plan     Row Name 02/15/19 1237          PT Assessment    Assessment Comments  Pt made excellent progress today with ability to  perform 20 revolutions on recumbent bike as well as notably improved gait entering clinic. Pt remains limited in compliance with HEP at home but reports intermittent attempted ROM over last 2 days. Continued functional strengthening and stair negotiation and encouraged pt to reciprocally negotiate at home and at work.   -LS        PT Plan    PT Plan Comments  Continue recumbent bike, stair training, gait training.   -LS       User Key  (r) = Recorded By, (t) = Taken By, (c) = Cosigned By    Initials Name Provider Type    Dona Azul, PT Physical Therapist          Modalities     Row Name 02/15/19 1000             Ice    Ice Applied  Yes  -LS      Location  R knee extended on pillow  -LS      Rx Minutes  10 mins  -LS      Ice S/P Rx  Yes  -LS        User Key  (r) = Recorded By, (t) = Taken By, (c) = Cosigned By    Initials Name Provider Type    Dona Azul, PT Physical Therapist          Exercises     Row Name 02/15/19 1000             Subjective Comments    Subjective Comments  I am doing ok. I am tired this morning. I didn't go to [employer] like I planned.  -LS         Subjective Pain    Able to rate subjective pain?  yes  -LS      Pre-Treatment Pain Level  0  -LS         Total Minutes    95289 - PT Therapeutic Exercise Minutes  44  -LS         Exercise 1    Exercise Name 1  QS   -LS      Reps 1  15  -LS      Time 1  5  -LS      Additional Comments  seated blue ball  -LS         Exercise 3    Exercise Name 3  heel prop with quad set  -LS      Reps 3  15  -LS      Time 3  5  -LS         Exercise 4    Exercise Name 4  HR  -LS      Reps 4  20  -LS         Exercise 5    Exercise Name 5  HS curls  -LS      Sets 5  2  -LS      Reps 5  10  -LS      Additional Comments  3#; B  -LS         Exercise 7    Exercise Name 7  standing hip abd  -LS      Reps 7  20  -LS      Additional Comments  3# B  -LS         Exercise 8    Exercise Name 8  knee flexion stretch  -LS      Reps 8  3  -LS      Time 8  20s  -LS       "   Exercise 9    Exercise Name 9  NuStep UE/LE  - L5  -LS      Time 9  5 minutes  -LS         Exercise 11    Exercise Name 11  standing TKE  -LS      Reps 11  20  -LS      Time 11  3  -LS      Additional Comments  10 with band; 10 without  -LS         Exercise 13    Exercise Name 13  recumbent bike  -LS      Time 13  5 minutes   -LS      Additional Comments  20 full revolutions  -LS         Exercise 14    Exercise Name 14  leg press  -LS      Sets 14  3  -LS      Reps 14  15  -LS      Additional Comments  70# BLE  -LS         Exercise 15    Exercise Name 15  step up 6\"  -LS      Reps 15  15  -LS         Exercise 16    Exercise Name 16  step down 6\"  -LS      Reps 16  10  -LS      Additional Comments  LLE step down  -LS        User Key  (r) = Recorded By, (t) = Taken By, (c) = Cosigned By    Initials Name Provider Type    Dona Azul, PT Physical Therapist                         PT OP Goals     Row Name 02/15/19 1200          PT Short Term Goals    STG 1  Patient will be independent with initial HEP for ROM/flexibility without increased symptoms  -LS     STG 1 Progress  Met  -LS     STG 2  Patient will be independent with education for symptom management, joint protection, and strategies to minimize stress on affected tissues.  -LS     STG 2 Progress  Met  -LS     STG 3  Patient will have R knee flexion AROM >/= 105 degrees for increased ease/tolerance with ADL's/transitional movements  -LS     STG 3 Progress  Met  -LS     STG 4  Patient will have R knee extension ROM </= 5 degrees for normalization of heel to toe gait mechanics  -LS     STG 4 Progress  Met  -LS        Long Term Goals    LTG 1  Patient will be independent with established HEP for strength/stabilization to facilitate self management of condition  -LS     LTG 1 Progress  Ongoing  -LS     LTG 2  Patient will demonstrate nearly normal gait without AD to facilitate return to community activities  -LS     LTG 2 Progress  Ongoing  -LS     LTG 3  " Patient will have hip/knee strength 4/5 to 4+/5 for increased ease/tolerance with prolonged WB activity   -LS     LTG 3 Progress  Ongoing  -LS     LTG 4  Patient will be able to go up/down stairs reciprocally with </= 1HR support and minimal pain/compensation for greater ease with community mobility.  -LS     LTG 4 Progress  Ongoing  -LS     LTG 5  Patient will have improved score on KOS Activities of Daily Living Scale from39/80 to >/= 53/80 demonstrating reduced functional limitations.   -LS     LTG 5 Progress  Met  -LS       User Key  (r) = Recorded By, (t) = Taken By, (c) = Cosigned By    Initials Name Provider Type    Dona Azul, PT Physical Therapist          Therapy Education  Given: Symptoms/condition management, HEP  Program: Reinforced  How Provided: Verbal, Demonstration  Provided to: Patient  Level of Understanding: Teach back education performed, Verbalized, Demonstrated              Time Calculation:   Start Time: 0915  Stop Time: 1010  Time Calculation (min): 55 min  Total Timed Code Minutes- PT: 44 minute(s)  Therapy Suggested Charges     Code   Minutes Charges    54135 (CPT®) Hc Pt Neuromusc Re Education Ea 15 Min      83541 (CPT®) Hc Pt Ther Proc Ea 15 Min 44 3    36085 (CPT®) Hc Gait Training Ea 15 Min      62565 (CPT®) Hc Pt Therapeutic Act Ea 15 Min      79495 (CPT®) Hc Pt Manual Therapy Ea 15 Min      40476 (CPT®) Hc Pt Ther Massage- Per 15 Min      61190 (CPT®) Hc Pt Iontophoresis Ea 15 Min      78883 (CPT®) Hc Pt Elec Stim Ea-Per 15 Min      27386 (CPT®) Hc Pt Ultrasound Ea 15 Min      24682 (CPT®) Hc Pt Self Care/Mgmt/Train Ea 15 Min      18975 (CPT®) Hc Pt Prosthetic (S) Train Initial Encounter, Each 15 Min      82743 (CPT®) Hc Orthotic(S) Mgmt/Train Initial Encounter, Each 15min      09090 (CPT®) Hc Pt Aquatic Therapy Ea 15 Min      65769 (CPT®) Hc Pt Orthotic(S)/Prosthetic(S) Encounter, Each 15 Min       (CPT®) Hc Pt Electrical Stim Unattended      Total  44 3        Therapy  Charges for Today     Code Description Service Date Service Provider Modifiers Qty    52363068378 HC PT THER PROC EA 15 MIN 2/15/2019 Dona Gramajo, PT GP 3    54694765676 HC PT HOT OR COLD PACK TREAT MCARE 2/15/2019 Dona Gramajo, PT GP 1                    Dona Gramajo, PT  2/15/2019

## 2019-02-15 NOTE — PROGRESS NOTES
Елена Beach : 1945 MRN: [id number] DATE: 2/15/2019    DIAGNOSIS: 6 week follow up right TKA      SUBJECTIVE:  Patient returns today for 6 week follow up of right total knee replacement. Patient reports doing well with no unusual complaints.     Vitals:    02/15/19 1141   Temp: 97.9 °F (36.6 °C)       OBJECTIVE:     Exam:  The incision is well healed. No sign of infection. Range of motion is measured at full extension to 105 of flexion. The calf is soft and nontender with a negative Homans sign.  Gait is reciprocal heel-to-toe and only mildly antalgic.    DIAGNOSTIC STUDIES    Xrays: 3 views of the right knee (AP, lateral, and sunrise) were ordered and reviewed for evaluation of recent knee replacement. They demonstrate a well positioned, well aligned knee replacement without complicating factors noted. In comparison with previous films there has been no change.    ASSESSMENT:  6 week status post right knee replacement.    PLAN:   1) Continue with PT exercises as prescribed     2) Follow up in 3 months    Gerry Estevez MD  2/15/2019

## 2019-02-18 ENCOUNTER — HOSPITAL ENCOUNTER (OUTPATIENT)
Dept: PHYSICAL THERAPY | Facility: HOSPITAL | Age: 74
Setting detail: THERAPIES SERIES
Discharge: HOME | End: 2019-02-18
Attending: ORTHOPAEDIC SURGERY

## 2019-02-18 DIAGNOSIS — G89.29 CHRONIC PAIN OF RIGHT KNEE: ICD-10-CM

## 2019-02-18 DIAGNOSIS — M25.561 CHRONIC PAIN OF RIGHT KNEE: ICD-10-CM

## 2019-02-18 DIAGNOSIS — Z96.651 STATUS POST RIGHT KNEE REPLACEMENT: Primary | ICD-10-CM

## 2019-02-18 DIAGNOSIS — Z47.89 ORTHOPEDIC AFTERCARE: ICD-10-CM

## 2019-02-18 DIAGNOSIS — M25.561 ACUTE PAIN OF RIGHT KNEE: ICD-10-CM

## 2019-02-18 DIAGNOSIS — R26.89 IMPAIRED GAIT AND MOBILITY: ICD-10-CM

## 2019-02-18 PROCEDURE — 97110 THERAPEUTIC EXERCISES: CPT | Performed by: PHYSICAL THERAPIST

## 2019-02-18 NOTE — THERAPY TREATMENT NOTE
Outpatient Physical Therapy Ortho Treatment Note  [facility]     Patient Name: Елена Beach  : 1945  MRN: [id number]  Today's Date: 2019      Visit Date: 2019    Visit Dx:    ICD-10-CM ICD-9-CM   1. Status post right knee replacement Z96.651 V43.65   2. Acute pain of right knee M25.561 719.46   3. Orthopedic aftercare Z47.89 V54.9   4. Impaired gait and mobility R26.89 781.2   5. Chronic pain of right knee M25.561 719.46    G89.29 338.29       Patient Active Problem List   Diagnosis   • Fibromyalgia   • Arthritis   • Hiatal hernia   • Anxiety and depression   • Asthma   • Hyperlipidemia   • Iron deficiency anemia   • Chronic obstructive pulmonary disease (CMS/[facility])   • Chronic fatigue   • Snoring   • Dependence on nicotine from cigarettes   • Insomnia   • Osteopenia   • Right calf pain   • Shortness of breath   • Status post surgery        Past Medical History:   Diagnosis Date   • Anemia    • Arthritis    • Asthma    • Back pain    • Bursitis    • COPD (chronic obstructive pulmonary disease) (CMS/[facility])    • Fibromyalgia    • Fibromyositis    • GERD (gastroesophageal reflux disease)    • Hiatal hernia    • History of pneumonia    • Hyperlipidemia    • Knee pain    • Osteoarthritis    • Osteoporosis    • PONV (postoperative nausea and vomiting)    • Scoliosis         Past Surgical History:   Procedure Laterality Date   • COLONOSCOPY     • ENDOSCOPY     • FOOT SURGERY Right 2006   • HAND SURGERY     • SHOULDER ARTHROSCOPY     • SHOULDER SURGERY Left    • TOE SURGERY Right    • TOOTH EXTRACTION     • TOTAL KNEE ARTHROPLASTY Right 2018    Procedure: TOTAL KNEE ARTHROPLASTY;  Surgeon: Gerry Estevez MD;  Location: [facility] OR;  Service: Orthopedics   • TUBAL ABDOMINAL LIGATION     • WRIST SURGERY                         PT Assessment/Plan     Row Name 19 1515          PT Assessment    Assessment Comments  Patient once again able to perform full  "revolution on recumbant bike however some compensation noted from hips. Also patient demonstrates poor eccentric quad strength during step downs; recommend switching to shorter step height. Less discomfort noted towards end of session due to knee feeling looser  -KH       User Key  (r) = Recorded By, (t) = Taken By, (c) = Cosigned By    Initials Name Provider Type    Tawanna Banda, PT Physical Therapist              Exercises     Row Name 02/18/19 1400             Subjective Comments    Subjective Comments  I am doing ok  -KH         Subjective Pain    Able to rate subjective pain?  yes  -KH      Pre-Treatment Pain Level  0  -KH         Total Minutes    85697 - PT Therapeutic Exercise Minutes  40  -KH         Exercise 3    Exercise Name 3  heel prop with quad set  -KH      Reps 3  15  -KH      Time 3  5  -KH      Additional Comments  after quad sets layed propped with ice 5 min  -KH         Exercise 4    Exercise Name 4  HR  -KH      Reps 4  20  -KH         Exercise 5    Exercise Name 5  HS curls  -KH      Sets 5  2  -KH      Reps 5  10  -KH      Additional Comments  3# B  -KH         Exercise 7    Exercise Name 7  standing hip abd  -KH      Sets 7  2  -KH      Reps 7  10  -KH      Additional Comments  3# B  -KH         Exercise 8    Exercise Name 8  knee flexion stretch  -KH      Reps 8  3  -KH      Time 8  20s  -KH         Exercise 9    Exercise Name 9  NuStep UE/LE  - L5  -KH      Time 9  5 minutes  -KH         Exercise 13    Exercise Name 13  recumbent bike  -KH      Time 13  5 minutes   -KH      Additional Comments  full revolutions  -KH         Exercise 14    Exercise Name 14  leg press  -KH      Sets 14  3  -KH      Reps 14  15  -KH      Additional Comments  70 lbs B LE  -KH         Exercise 15    Exercise Name 15  step up 6\"  -KH      Reps 15  15  -KH         Exercise 16    Exercise Name 16  step down 6\"  -KH      Reps 16  10  -KH      Additional Comments  L LE step down  -KH        User Key  (r) = " Recorded By, (t) = Taken By, (c) = Cosigned By    Initials Name Provider Type    Tawanna Banda, PT Physical Therapist                                            Time Calculation:   Start Time: 1445  Stop Time: 1525  Time Calculation (min): 40 min  Therapy Suggested Charges     Code   Minutes Charges    81040 (CPT®) Hc Pt Neuromusc Re Education Ea 15 Min      67705 (CPT®) Hc Pt Ther Proc Ea 15 Min 40 3    56565 (CPT®) Hc Gait Training Ea 15 Min      65409 (CPT®) Hc Pt Therapeutic Act Ea 15 Min      95996 (CPT®) Hc Pt Manual Therapy Ea 15 Min      02336 (CPT®) Hc Pt Ther Massage- Per 15 Min      11719 (CPT®) Hc Pt Iontophoresis Ea 15 Min      02355 (CPT®) Hc Pt Elec Stim Ea-Per 15 Min      73925 (CPT®) Hc Pt Ultrasound Ea 15 Min      03680 (CPT®) Hc Pt Self Care/Mgmt/Train Ea 15 Min      16835 (CPT®) Hc Pt Prosthetic (S) Train Initial Encounter, Each 15 Min      20327 (CPT®) Hc Orthotic(S) Mgmt/Train Initial Encounter, Each 15min      87116 (CPT®) Hc Pt Aquatic Therapy Ea 15 Min      30330 (CPT®) Hc Pt Orthotic(S)/Prosthetic(S) Encounter, Each 15 Min       (CPT®) Hc Pt Electrical Stim Unattended      Total  40 3        Therapy Charges for Today     Code Description Service Date Service Provider Modifiers Qty    50314854455 HC PT THER PROC EA 15 MIN 2/18/2019 Tawanna Cabrera, PT GP 3                    Tawanna Cabrera PT  2/18/2019

## 2019-02-20 ENCOUNTER — HOSPITAL ENCOUNTER (OUTPATIENT)
Dept: PHYSICAL THERAPY | Facility: HOSPITAL | Age: 74
Setting detail: THERAPIES SERIES
Discharge: HOME | End: 2019-02-20
Attending: ORTHOPAEDIC SURGERY

## 2019-02-20 DIAGNOSIS — M25.561 ACUTE PAIN OF RIGHT KNEE: ICD-10-CM

## 2019-02-20 DIAGNOSIS — Z96.651 STATUS POST RIGHT KNEE REPLACEMENT: Primary | ICD-10-CM

## 2019-02-20 DIAGNOSIS — G89.29 CHRONIC PAIN OF RIGHT KNEE: ICD-10-CM

## 2019-02-20 DIAGNOSIS — M25.561 CHRONIC PAIN OF RIGHT KNEE: ICD-10-CM

## 2019-02-20 DIAGNOSIS — Z47.89 ORTHOPEDIC AFTERCARE: ICD-10-CM

## 2019-02-20 DIAGNOSIS — R26.89 IMPAIRED GAIT AND MOBILITY: ICD-10-CM

## 2019-02-20 PROCEDURE — 97110 THERAPEUTIC EXERCISES: CPT

## 2019-02-20 NOTE — THERAPY TREATMENT NOTE
Outpatient Physical Therapy Ortho Treatment Note  [facility]     Patient Name: Елена Beach  : 1945  MRN: [id number]  Today's Date: 2019      Visit Date: 2019    Visit Dx:    ICD-10-CM ICD-9-CM   1. Status post right knee replacement Z96.651 V43.65   2. Acute pain of right knee M25.561 719.46   3. Orthopedic aftercare Z47.89 V54.9   4. Impaired gait and mobility R26.89 781.2   5. Chronic pain of right knee M25.561 719.46    G89.29 338.29       Patient Active Problem List   Diagnosis   • Fibromyalgia   • Arthritis   • Hiatal hernia   • Anxiety and depression   • Asthma   • Hyperlipidemia   • Iron deficiency anemia   • Chronic obstructive pulmonary disease (CMS/[facility])   • Chronic fatigue   • Snoring   • Dependence on nicotine from cigarettes   • Insomnia   • Osteopenia   • Right calf pain   • Shortness of breath   • Status post surgery        Past Medical History:   Diagnosis Date   • Anemia    • Arthritis    • Asthma    • Back pain    • Bursitis    • COPD (chronic obstructive pulmonary disease) (CMS/[facility])    • Fibromyalgia    • Fibromyositis    • GERD (gastroesophageal reflux disease)    • Hiatal hernia    • History of pneumonia    • Hyperlipidemia    • Knee pain    • Osteoarthritis    • Osteoporosis    • PONV (postoperative nausea and vomiting)    • Scoliosis         Past Surgical History:   Procedure Laterality Date   • COLONOSCOPY     • ENDOSCOPY     • FOOT SURGERY Right 2006   • HAND SURGERY     • SHOULDER ARTHROSCOPY     • SHOULDER SURGERY Left    • TOE SURGERY Right    • TOOTH EXTRACTION     • TOTAL KNEE ARTHROPLASTY Right 2018    Procedure: TOTAL KNEE ARTHROPLASTY;  Surgeon: Gerry Estevez MD;  Location: [facility] OR;  Service: Orthopedics   • TUBAL ABDOMINAL LIGATION     • WRIST SURGERY                         PT Assessment/Plan     Row Name 19 1406          PT Assessment    Assessment Comments  Pt enters clinic with flat foot contact and dec  R TKE. Improves gait mechanics with cuing and repetition but unsure about carryover or compliance with focused walking at home. Continued ROM and strengthening. Pt able to perform full revolutions on recumbent bike.   -LS        PT Plan    PT Plan Comments  Prepare for d/c to independent management at end of next week. Consider full flight of steps.   -LS       User Key  (r) = Recorded By, (t) = Taken By, (c) = Cosigned By    Initials Name Provider Type    Dona Azul, PT Physical Therapist          Modalities     Row Name 02/20/19 1100             Ice    Ice Applied  Yes  -LS      Location  R knee extended on pillow  -LS      Rx Minutes  10 mins  -LS      Ice S/P Rx  Yes  -LS        User Key  (r) = Recorded By, (t) = Taken By, (c) = Cosigned By    Initials Name Provider Type    Dona Azul, PT Physical Therapist          Exercises     Row Name 02/20/19 1100             Subjective Comments    Subjective Comments  I am doing ok. I am just so tired. I am trying to walk heel toe but I don't really ever think about it. I don't like to exercise at home.  -LS         Subjective Pain    Able to rate subjective pain?  yes  -LS      Pre-Treatment Pain Level  0  -LS         Total Minutes    05169 - PT Therapeutic Exercise Minutes  43  -LS         Exercise 3    Exercise Name 3  heel prop with quad set  -LS      Reps 3  20  -LS      Time 3  5  -LS         Exercise 4    Exercise Name 4  HR  -LS      Reps 4  20  -LS         Exercise 5    Exercise Name 5  HS curls  -LS      Sets 5  2  -LS      Reps 5  10  -LS      Additional Comments  3# B; minimal UE assist  -LS         Exercise 6    Exercise Name 6  hip abduction  -LS      Sets 6  2  -LS      Reps 6  10  -LS      Additional Comments  3#; B; light UE assist  -LS         Exercise 7    Exercise Name 7  --  -LS      Sets 7  --  -LS      Reps 7  --  -LS         Exercise 8    Exercise Name 8  knee flexion stretch  -LS      Reps 8  3  -LS      Time 8  20s  -LS          "Exercise 9    Exercise Name 9  NuStep UE/LE  - L5  -LS      Time 9  5 minutes  -LS         Exercise 10    Exercise Name 10  recumbent bike  -LS      Time 10  5 minutes  -LS      Additional Comments  full revolution  -LS         Exercise 11    Exercise Name 11  step up 6\"/step down to blue foam  -LS      Reps 11  15  -LS      Additional Comments  RLE up, LLE down  -LS         Exercise 12    Exercise Name 12  standing TKE GTB  -LS      Reps 12  15  -LS      Time 12  5  -LS      Additional Comments  tactile cuing to reduce hip extension  -LS         Exercise 13    Exercise Name 13  MS at bar  -LS      Reps 13  20  -LS      Time 13  --  -LS         Exercise 14    Exercise Name 14  leg press  -LS      Sets 14  3  -LS      Reps 14  15  -LS      Additional Comments  70# BLE  -LS         Exercise 15    Exercise Name 15  --  -LS      Reps 15  --  -LS         Exercise 16    Exercise Name 16  --  -LS      Reps 16  --  -LS        User Key  (r) = Recorded By, (t) = Taken By, (c) = Cosigned By    Initials Name Provider Type    Dona Azul, PT Physical Therapist                         PT OP Goals     Row Name 02/20/19 1400          PT Short Term Goals    STG 1  Patient will be independent with initial HEP for ROM/flexibility without increased symptoms  -LS     STG 1 Progress  Met  -LS     STG 2  Patient will be independent with education for symptom management, joint protection, and strategies to minimize stress on affected tissues.  -LS     STG 2 Progress  Met  -LS     STG 3  Patient will have R knee flexion AROM >/= 105 degrees for increased ease/tolerance with ADL's/transitional movements  -LS     STG 3 Progress  Met  -LS     STG 4  Patient will have R knee extension ROM </= 5 degrees for normalization of heel to toe gait mechanics  -LS     STG 4 Progress  Met  -LS        Long Term Goals    LTG 1  Patient will be independent with established HEP for strength/stabilization to facilitate self management of condition  -LS  " "   LTG 1 Progress  Ongoing  -LS     LTG 2  Patient will demonstrate nearly normal gait without AD to facilitate return to community activities  -LS     LTG 2 Progress  Progressing  -LS     LTG 2 Progress Comments  Improves with cuing.  -LS     LTG 3  Patient will have hip/knee strength 4/5 to 4+/5 for increased ease/tolerance with prolonged WB activity   -LS     LTG 3 Progress  Ongoing  -LS     LTG 4  Patient will be able to go up/down stairs reciprocally with </= 1HR support and minimal pain/compensation for greater ease with community mobility.  -LS     LTG 4 Progress  Progressing  -LS     LTG 4 Progress Comments  Pt able to perform on 6\" step. Will attempt next session.  -LS     LTG 5  Patient will have improved score on KOS Activities of Daily Living Scale from39/80 to >/= 53/80 demonstrating reduced functional limitations.   -LS     LTG 5 Progress  Met  -LS       User Key  (r) = Recorded By, (t) = Taken By, (c) = Cosigned By    Initials Name Provider Type    Dona Azul, PT Physical Therapist          Therapy Education  Education Details: continued to reinforce gait mechanics, TKE, HEP to continue to improve ROM.  Given: HEP, Posture/body mechanics, Symptoms/condition management, Mobility training  Program: Reinforced  How Provided: Verbal, Demonstration  Provided to: Patient  Level of Understanding: Teach back education performed, Verbalized, Demonstrated              Time Calculation:   Start Time: 1130  Stop Time: 1225  Time Calculation (min): 55 min  Total Timed Code Minutes- PT: 43 minute(s)  Therapy Suggested Charges     Code   Minutes Charges    35063 (CPT®) Hc Pt Neuromusc Re Education Ea 15 Min      06690 (CPT®) Hc Pt Ther Proc Ea 15 Min 43 3    74954 (CPT®) Hc Gait Training Ea 15 Min      42530 (CPT®) Hc Pt Therapeutic Act Ea 15 Min      85535 (CPT®) Hc Pt Manual Therapy Ea 15 Min      26176 (CPT®) Hc Pt Ther Massage- Per 15 Min      33565 (CPT®) Hc Pt Iontophoresis Ea 15 Min      66636 (CPT®) " Hc Pt Elec Stim Ea-Per 15 Min      53605 (CPT®) Hc Pt Ultrasound Ea 15 Min      18211 (CPT®) Hc Pt Self Care/Mgmt/Train Ea 15 Min      79252 (CPT®) Hc Pt Prosthetic (S) Train Initial Encounter, Each 15 Min      17110 (CPT®) Hc Orthotic(S) Mgmt/Train Initial Encounter, Each 15min      30568 (CPT®) Hc Pt Aquatic Therapy Ea 15 Min      80712 (CPT®) Hc Pt Orthotic(S)/Prosthetic(S) Encounter, Each 15 Min       (CPT®) Hc Pt Electrical Stim Unattended      Total  43 3        Therapy Charges for Today     Code Description Service Date Service Provider Modifiers Qty    80239948541 HC PT THER PROC EA 15 MIN 2/20/2019 Dona Gramajo, PT GP 3    58980355442 HC PT HOT OR COLD PACK TREAT MCARE 2/20/2019 Dona Gramajo, PT GP 1                    Dona Gramajo, PT  2/20/2019

## 2019-02-25 ENCOUNTER — HOSPITAL ENCOUNTER (OUTPATIENT)
Dept: PHYSICAL THERAPY | Facility: HOSPITAL | Age: 74
Setting detail: THERAPIES SERIES
Discharge: HOME | End: 2019-02-25
Attending: ORTHOPAEDIC SURGERY

## 2019-02-25 DIAGNOSIS — Z96.651 STATUS POST RIGHT KNEE REPLACEMENT: Primary | ICD-10-CM

## 2019-02-25 DIAGNOSIS — M25.561 ACUTE PAIN OF RIGHT KNEE: ICD-10-CM

## 2019-02-25 DIAGNOSIS — Z47.89 ORTHOPEDIC AFTERCARE: ICD-10-CM

## 2019-02-25 PROCEDURE — 97110 THERAPEUTIC EXERCISES: CPT

## 2019-02-25 NOTE — THERAPY TREATMENT NOTE
Outpatient Physical Therapy Ortho Treatment Note  [facility]     Patient Name: Елена Beach  : 1945  MRN: [id number]  Today's Date: 2019      Visit Date: 2019    Visit Dx:    ICD-10-CM ICD-9-CM   1. Status post right knee replacement Z96.651 V43.65   2. Acute pain of right knee M25.561 719.46   3. Orthopedic aftercare Z47.89 V54.9       Patient Active Problem List   Diagnosis   • Fibromyalgia   • Arthritis   • Hiatal hernia   • Anxiety and depression   • Asthma   • Hyperlipidemia   • Iron deficiency anemia   • Chronic obstructive pulmonary disease (CMS/[facility])   • Chronic fatigue   • Snoring   • Dependence on nicotine from cigarettes   • Insomnia   • Osteopenia   • Right calf pain   • Shortness of breath   • Status post surgery        Past Medical History:   Diagnosis Date   • Anemia    • Arthritis    • Asthma    • Back pain    • Bursitis    • COPD (chronic obstructive pulmonary disease) (CMS/[facility])    • Fibromyalgia    • Fibromyositis    • GERD (gastroesophageal reflux disease)    • Hiatal hernia    • History of pneumonia    • Hyperlipidemia    • Knee pain    • Osteoarthritis    • Osteoporosis    • PONV (postoperative nausea and vomiting)    • Scoliosis         Past Surgical History:   Procedure Laterality Date   • COLONOSCOPY     • ENDOSCOPY     • FOOT SURGERY Right 2006   • HAND SURGERY     • SHOULDER ARTHROSCOPY     • SHOULDER SURGERY Left    • TOE SURGERY Right    • TOOTH EXTRACTION     • TOTAL KNEE ARTHROPLASTY Right 2018    Procedure: TOTAL KNEE ARTHROPLASTY;  Surgeon: Gerry Estevez MD;  Location: [facility];  Service: Orthopedics   • TUBAL ABDOMINAL LIGATION     • WRIST SURGERY                         PT Assessment/Plan     Row Name 19 7025          PT Assessment    Assessment Comments  Pt with poor compliance with HEP and complaining of stiffness in R knee. Her gait mechanics have improved with equal B step length and improving heel strike.  However, continued dec TKE R. Pt planning to d/c after next visit. Discussed the importance of continued AROM and strengthening exercises at home in order to contiue to address deficits. Pt able to ascend/descend full flight of stairs reciprocally today. Slight hesitation initially but improved confidence as she progressed.   -LS        PT Plan    PT Plan Comments  d/c after next visit. Continue to reinforce the importance of continued HEP.   -LS       User Key  (r) = Recorded By, (t) = Taken By, (c) = Cosigned By    Initials Name Provider Type    Dona Azul, PT Physical Therapist          Modalities     Row Name 02/25/19 1300             Ice    Ice Applied  Yes  -LS      Location  R knee extended on pillow  -LS      Rx Minutes  10 mins  -LS      Ice S/P Rx  Yes  -LS        User Key  (r) = Recorded By, (t) = Taken By, (c) = Cosigned By    Initials Name Provider Type    Dona Azul, PT Physical Therapist          Exercises     Row Name 02/25/19 1300             Subjective Comments    Subjective Comments  I was sick all weekend. It just gets stiff and tight.  -LS         Subjective Pain    Able to rate subjective pain?  yes  -LS      Pre-Treatment Pain Level  0  -LS         Total Minutes    33484 - PT Therapeutic Exercise Minutes  43  -LS         Exercise 1    Exercise Name 1  QS  -LS      Reps 1  20  -LS      Time 1  5  -LS      Additional Comments  seated into blue ball  -LS         Exercise 2    Exercise Name 2  SAQ  -LS      Reps 2  20  -LS      Additional Comments  3#  -LS         Exercise 3    Exercise Name 3  heel prop with quad set  -LS      Reps 3  20  -LS      Time 3  5  -LS         Exercise 4    Exercise Name 4  HR  -LS      Reps 4  20  -LS         Exercise 5    Exercise Name 5  HS curls  -LS      Sets 5  2  -LS      Reps 5  10  -LS      Additional Comments  2#  -LS         Exercise 8    Exercise Name 8  knee flexion stretch  -LS      Reps 8  3  -LS      Time 8  20s  -LS         Exercise 9     Exercise Name 9  NuStep UE/LE  - L5  -LS      Time 9  5 minutes  -LS         Exercise 10    Exercise Name 10  recumbent bike  -LS      Time 10  5 minutes  -LS      Additional Comments  full revolution  -LS         Exercise 11    Exercise Name 11  stair training in clinic  -LS      Reps 11  5 x up and down reciprocally  -LS         Exercise 12    Exercise Name 12  standing TKE GTB  -LS      Reps 12  15  -LS      Time 12  5  -LS      Additional Comments  tactile cuing to reduce hip extension  -LS         Exercise 13    Exercise Name 13  MS at bar  -LS      Reps 13  20  -LS         Exercise 14    Exercise Name 14  leg press  -LS      Sets 14  3  -LS      Reps 14  15  -LS      Additional Comments  70# BLE  -LS         Exercise 17    Exercise Name 17  LAQ  -LS      Reps 17  20  -LS      Additional Comments  3#  -LS        User Key  (r) = Recorded By, (t) = Taken By, (c) = Cosigned By    Initials Name Provider Type    LS Dona Gramajo, PT Physical Therapist                         PT OP Goals     Row Name 02/25/19 1300          PT Short Term Goals    STG 1  Patient will be independent with initial HEP for ROM/flexibility without increased symptoms  -LS     STG 1 Progress  Met  -LS     STG 2  Patient will be independent with education for symptom management, joint protection, and strategies to minimize stress on affected tissues.  -LS     STG 2 Progress  Met  -LS     STG 3  Patient will have R knee flexion AROM >/= 105 degrees for increased ease/tolerance with ADL's/transitional movements  -LS     STG 3 Progress  Met  -LS     STG 4  Patient will have R knee extension ROM </= 5 degrees for normalization of heel to toe gait mechanics  -LS     STG 4 Progress  Met  -LS        Long Term Goals    LTG 1  Patient will be independent with established HEP for strength/stabilization to facilitate self management of condition  -LS     LTG 1 Progress  Ongoing  -LS     LTG 1 Progress Comments  Pt limited in compliance with HEP.  -LS      LTG 2  Patient will demonstrate nearly normal gait without AD to facilitate return to community activities  -LS     LTG 2 Progress  Progressing  -LS     LTG 3  Patient will have hip/knee strength 4/5 to 4+/5 for increased ease/tolerance with prolonged WB activity   -LS     LTG 3 Progress  Ongoing  -LS     LTG 4  Patient will be able to go up/down stairs reciprocally with </= 1HR support and minimal pain/compensation for greater ease with community mobility.  -LS     LTG 4 Progress  Met  -LS     LTG 5  Patient will have improved score on KOS Activities of Daily Living Scale from39/80 to >/= 53/80 demonstrating reduced functional limitations.   -LS     LTG 5 Progress  Met  -LS       User Key  (r) = Recorded By, (t) = Taken By, (c) = Cosigned By    Initials Name Provider Type    Dona Azul, PT Physical Therapist          Therapy Education  Given: Symptoms/condition management, HEP  Program: Reinforced  How Provided: Demonstration, Verbal  Provided to: Patient  Level of Understanding: Teach back education performed, Verbalized              Time Calculation:   Start Time: 1130  Stop Time: 1225  Time Calculation (min): 55 min  Total Timed Code Minutes- PT: 43 minute(s)  Therapy Suggested Charges     Code   Minutes Charges    01290 (CPT®) Hc Pt Neuromusc Re Education Ea 15 Min      15364 (CPT®) Hc Pt Ther Proc Ea 15 Min 43 3    42730 (CPT®) Hc Gait Training Ea 15 Min      54146 (CPT®) Hc Pt Therapeutic Act Ea 15 Min      53852 (CPT®) Hc Pt Manual Therapy Ea 15 Min      40969 (CPT®) Hc Pt Ther Massage- Per 15 Min      81197 (CPT®) Hc Pt Iontophoresis Ea 15 Min      92139 (CPT®) Hc Pt Elec Stim Ea-Per 15 Min      31093 (CPT®) Hc Pt Ultrasound Ea 15 Min      84295 (CPT®) Hc Pt Self Care/Mgmt/Train Ea 15 Min      69057 (CPT®) Hc Pt Prosthetic (S) Train Initial Encounter, Each 15 Min      58976 (CPT®) Hc Orthotic(S) Mgmt/Train Initial Encounter, Each 15min      87209 (CPT®) Hc Pt Aquatic Therapy Ea 15 Min      06634  (CPT®) Hc Pt Orthotic(S)/Prosthetic(S) Encounter, Each 15 Min       (CPT®) Hc Pt Electrical Stim Unattended      Total  43 3        Therapy Charges for Today     Code Description Service Date Service Provider Modifiers Qty    91859931829 HC PT THER PROC EA 15 MIN 2/25/2019 Dona Gramajo, PT GP 3    66892212978 HC PT HOT OR COLD PACK TREAT MCARE 2/25/2019 Dona Gramajo, PT GP 1                    Dona Gramajo, PT  2/25/2019

## 2019-02-27 ENCOUNTER — HOSPITAL ENCOUNTER (OUTPATIENT)
Dept: PHYSICAL THERAPY | Facility: HOSPITAL | Age: 74
Setting detail: THERAPIES SERIES
Discharge: HOME | End: 2019-02-27
Attending: ORTHOPAEDIC SURGERY

## 2019-02-27 ENCOUNTER — APPOINTMENT (OUTPATIENT)
Dept: PHYSICAL THERAPY | Facility: HOSPITAL | Age: 74
End: 2019-02-27
Attending: ORTHOPAEDIC SURGERY

## 2019-02-27 DIAGNOSIS — M25.561 CHRONIC PAIN OF RIGHT KNEE: ICD-10-CM

## 2019-02-27 DIAGNOSIS — Z47.89 ORTHOPEDIC AFTERCARE: ICD-10-CM

## 2019-02-27 DIAGNOSIS — M25.561 ACUTE PAIN OF RIGHT KNEE: ICD-10-CM

## 2019-02-27 DIAGNOSIS — Z96.651 STATUS POST RIGHT KNEE REPLACEMENT: Primary | ICD-10-CM

## 2019-02-27 DIAGNOSIS — R26.89 IMPAIRED GAIT AND MOBILITY: ICD-10-CM

## 2019-02-27 DIAGNOSIS — G89.29 CHRONIC PAIN OF RIGHT KNEE: ICD-10-CM

## 2019-02-27 PROCEDURE — 97110 THERAPEUTIC EXERCISES: CPT

## 2019-02-27 NOTE — THERAPY TREATMENT NOTE
Outpatient Physical Therapy Ortho Treatment Note  [facility]     Patient Name: Елена Beach  : 1945  MRN: [id number]  Today's Date: 2019      Visit Date: 2019    Visit Dx:    ICD-10-CM ICD-9-CM   1. Status post right knee replacement Z96.651 V43.65   2. Acute pain of right knee M25.561 719.46   3. Orthopedic aftercare Z47.89 V54.9   4. Impaired gait and mobility R26.89 781.2   5. Chronic pain of right knee M25.561 719.46    G89.29 338.29       Patient Active Problem List   Diagnosis   • Fibromyalgia   • Arthritis   • Hiatal hernia   • Anxiety and depression   • Asthma   • Hyperlipidemia   • Iron deficiency anemia   • Chronic obstructive pulmonary disease (CMS/[facility])   • Chronic fatigue   • Snoring   • Dependence on nicotine from cigarettes   • Insomnia   • Osteopenia   • Right calf pain   • Shortness of breath   • Status post surgery        Past Medical History:   Diagnosis Date   • Anemia    • Arthritis    • Asthma    • Back pain    • Bursitis    • COPD (chronic obstructive pulmonary disease) (CMS/[facility])    • Fibromyalgia    • Fibromyositis    • GERD (gastroesophageal reflux disease)    • Hiatal hernia    • History of pneumonia    • Hyperlipidemia    • Knee pain    • Osteoarthritis    • Osteoporosis    • PONV (postoperative nausea and vomiting)    • Scoliosis         Past Surgical History:   Procedure Laterality Date   • COLONOSCOPY     • ENDOSCOPY     • FOOT SURGERY Right 2006   • HAND SURGERY     • SHOULDER ARTHROSCOPY     • SHOULDER SURGERY Left    • TOE SURGERY Right    • TOOTH EXTRACTION     • TOTAL KNEE ARTHROPLASTY Right 2018    Procedure: TOTAL KNEE ARTHROPLASTY;  Surgeon: Gerry Estevez MD;  Location: [facility] OR;  Service: Orthopedics   • TUBAL ABDOMINAL LIGATION     • WRIST SURGERY                         PT Assessment/Plan     Row Name 19 1234          PT Assessment    Assessment Comments  All goals met. Patient discharged to [facility]        User Key  (r) = Recorded By, (t) = Taken By, (c) = Cosigned By    Initials Name Provider Type    WS Chino Hein PTA Physical Therapy Assistant          Modalities     Row Name 02/27/19 1200             Ice    Ice Applied  Yes  -WS      Location  R knee extended on pillow  -WS      Rx Minutes  10 mins  -WS      Ice S/P Rx  Yes  -WS        User Key  (r) = Recorded By, (t) = Taken By, (c) = Cosigned By    Initials Name Provider Type     Chino Hein PTA Physical Therapy Assistant          Exercises     Row Name 02/27/19 1200             Subjective Comments    Subjective Comments  Pain is low and intermittent  -WS         Subjective Pain    Able to rate subjective pain?  yes  -WS      Pre-Treatment Pain Level  1  -WS      Subjective Pain Comment  slept good the last 2 nights  -WS         Total Minutes    07265 - PT Therapeutic Exercise Minutes  40  -WS         Exercise 1    Exercise Name 1  QS  -WS      Reps 1  20  -WS      Time 1  5  -WS      Additional Comments  seated into blue ball  -WS         Exercise 2    Exercise Name 2  SAQ  -WS      Reps 2  20  -WS      Additional Comments  3#  -WS         Exercise 3    Exercise Name 3  heel prop with quad set  -WS      Reps 3  20  -WS      Time 3  5  -WS         Exercise 4    Exercise Name 4  HR  -WS      Reps 4  20  -WS         Exercise 5    Exercise Name 5  HS curls  -WS      Sets 5  2  -WS      Reps 5  10  -WS      Additional Comments  2# B  -WS         Exercise 8    Exercise Name 8  knee flexion stretch  -WS      Reps 8  3  -WS      Time 8  20s  -WS         Exercise 9    Exercise Name 9  NuStep UE/LE  - L5  -WS      Time 9  5 minutes  -WS         Exercise 10    Exercise Name 10  recumbent bike  -WS      Time 10  5 minutes  -WS         Exercise 11    Exercise Name 11  stair training in clinic  -WS      Reps 11  5 x up and down reciprocally  -WS         Exercise 12    Exercise Name 12  standing TKE GTB  -WS      Reps 12  15  -WS      Time 12  5  -WS          Exercise 13    Exercise Name 13  MS at bar  -      Reps 13  20  -         Exercise 14    Exercise Name 14  leg press  -      Sets 14  3  -      Reps 14  15  -WS      Additional Comments  70# surjit  -         Exercise 17    Exercise Name 17  LAQ  -      Reps 17  20  -      Additional Comments  3#  -        User Key  (r) = Recorded By, (t) = Taken By, (c) = Cosigned By    Initials Name Provider Type    Chino Johnson PTA Physical Therapy Assistant                         PT OP Goals     Row Name 02/27/19 1200          PT Short Term Goals    STG 1  Patient will be independent with initial HEP for ROM/flexibility without increased symptoms  -     STG 1 Progress  Met  -     STG 2  Patient will be independent with education for symptom management, joint protection, and strategies to minimize stress on affected tissues.  -     STG 2 Progress  Met  -     STG 3  Patient will have R knee flexion AROM >/= 105 degrees for increased ease/tolerance with ADL's/transitional movements  -     STG 3 Progress  Met  -     STG 4  Patient will have R knee extension ROM </= 5 degrees for normalization of heel to toe gait mechanics  -     STG 4 Progress  Met  -        Long Term Goals    LTG 1  Patient will be independent with established HEP for strength/stabilization to facilitate self management of condition  -     LTG 1 Progress  Met  -     LTG 2  Patient will demonstrate nearly normal gait without AD to facilitate return to community activities  -     LTG 2 Progress  Met  -     LTG 3  Patient will have hip/knee strength 4/5 to 4+/5 for increased ease/tolerance with prolonged WB activity   -     LTG 3 Progress  Met  -     LTG 4  Patient will be able to go up/down stairs reciprocally with </= 1HR support and minimal pain/compensation for greater ease with community mobility.  -     LTG 4 Progress  Met  -     LTG 5  Patient will have improved score on KOS Activities of Daily Living  Scale from39/80 to >/= 53/80 demonstrating reduced functional limitations.   -WS     LTG 5 Progress  Met  -WS       User Key  (r) = Recorded By, (t) = Taken By, (c) = Cosigned By    Initials Name Provider Type    Chino Johnson PTA Physical Therapy Assistant          Therapy Education  Given: HEP, Symptoms/condition management, Pain management, Edema management  Program: Reinforced  How Provided: Verbal  Provided to: Patient              Time Calculation:   Start Time: 1200  Stop Time: 1250  Time Calculation (min): 50 min  Therapy Suggested Charges     Code   Minutes Charges    95372 (CPT®) Hc Pt Neuromusc Re Education Ea 15 Min      07190 (CPT®) Hc Pt Ther Proc Ea 15 Min 40 3    70783 (CPT®) Hc Gait Training Ea 15 Min      73516 (CPT®) Hc Pt Therapeutic Act Ea 15 Min      37217 (CPT®) Hc Pt Manual Therapy Ea 15 Min      49189 (CPT®) Hc Pt Ther Massage- Per 15 Min      67440 (CPT®) Hc Pt Iontophoresis Ea 15 Min      07354 (CPT®) Hc Pt Elec Stim Ea-Per 15 Min      07888 (CPT®) Hc Pt Ultrasound Ea 15 Min      87828 (CPT®) Hc Pt Self Care/Mgmt/Train Ea 15 Min      28272 (CPT®) Hc Pt Prosthetic (S) Train Initial Encounter, Each 15 Min      77994 (CPT®) Hc Orthotic(S) Mgmt/Train Initial Encounter, Each 15min      53519 (CPT®) Hc Pt Aquatic Therapy Ea 15 Min      20937 (CPT®) Hc Pt Orthotic(S)/Prosthetic(S) Encounter, Each 15 Min       (CPT®) Hc Pt Electrical Stim Unattended      Total  40 3        Therapy Charges for Today     Code Description Service Date Service Provider Modifiers Qty    48546683813 HC PT THER PROC EA 15 MIN 2/27/2019 Chino Hein PTA GP 3    70472948960 HC PT HOT OR COLD PACK TREAT MCARE 2/27/2019 Chino Hein PTA GP 1                    Chino Hein PTA  2/27/2019

## 2019-03-01 ENCOUNTER — APPOINTMENT (OUTPATIENT)
Dept: PHYSICAL THERAPY | Facility: HOSPITAL | Age: 74
End: 2019-03-01
Attending: ORTHOPAEDIC SURGERY

## 2019-04-22 RX ORDER — OMEPRAZOLE 40 MG/1
CAPSULE, DELAYED RELEASE ORAL
Qty: 90 CAPSULE | Refills: 3 | OUTPATIENT
Start: 2019-04-22

## 2019-04-23 RX ORDER — OMEPRAZOLE 40 MG/1
CAPSULE, DELAYED RELEASE ORAL
Qty: 90 CAPSULE | Refills: 3 | OUTPATIENT
Start: 2019-04-23

## 2019-04-24 RX ORDER — OMEPRAZOLE 40 MG/1
CAPSULE, DELAYED RELEASE ORAL
Qty: 90 CAPSULE | Refills: 3 | OUTPATIENT
Start: 2019-04-24

## 2019-04-25 ENCOUNTER — DOCUMENTATION (OUTPATIENT)
Dept: PHYSICAL THERAPY | Facility: HOSPITAL | Age: 74
End: 2019-04-25

## 2019-04-26 RX ORDER — OMEPRAZOLE 40 MG/1
CAPSULE, DELAYED RELEASE ORAL
Qty: 90 CAPSULE | Refills: 3 | OUTPATIENT
Start: 2019-04-26

## 2019-04-26 NOTE — TELEPHONE ENCOUNTER
Faxed request received for same from [facility].  Denial faxed to [phone] - confirmation received.  (included note that this is 4th denial).

## 2019-05-01 ENCOUNTER — TELEPHONE (OUTPATIENT)
Dept: GASTROENTEROLOGY | Facility: CLINIC | Age: 74
End: 2019-05-01

## 2019-05-01 RX ORDER — OMEPRAZOLE 40 MG/1
40 CAPSULE, DELAYED RELEASE ORAL EVERY MORNING
Qty: 30 CAPSULE | Refills: 0 | Status: SHIPPED | OUTPATIENT
Start: 2019-05-01 | End: 2019-05-08 | Stop reason: SDUPTHER

## 2019-05-01 NOTE — TELEPHONE ENCOUNTER
Call to pt.  Advise that due for annual appt.  Verb understanding.  Scheduled for 5/8 @ 3pm with CHRISTIAN Edwards completed for omeprazole 40 mg 1 tab po daily, #30, R0.

## 2019-05-01 NOTE — TELEPHONE ENCOUNTER
Regarding: Prescription Question  Contact: [phone]  ----- Message from [employer], Generic sent at 4/30/2019  5:28 PM EDT -----    refill request for Omeprazole has been denied.   WHY?  Your office has not called me with any information.    Thank you ,, Renita Beach

## 2019-05-08 ENCOUNTER — OFFICE VISIT (OUTPATIENT)
Dept: GASTROENTEROLOGY | Facility: CLINIC | Age: 74
End: 2019-05-08

## 2019-05-08 VITALS
BODY MASS INDEX: 19.62 KG/M2 | HEIGHT: 62 IN | WEIGHT: 106.6 LBS | DIASTOLIC BLOOD PRESSURE: 64 MMHG | SYSTOLIC BLOOD PRESSURE: 112 MMHG | TEMPERATURE: 98.4 F

## 2019-05-08 DIAGNOSIS — D50.9 IRON DEFICIENCY ANEMIA, UNSPECIFIED IRON DEFICIENCY ANEMIA TYPE: ICD-10-CM

## 2019-05-08 DIAGNOSIS — K90.0 CELIAC DISEASE: ICD-10-CM

## 2019-05-08 DIAGNOSIS — K21.9 GASTROESOPHAGEAL REFLUX DISEASE, ESOPHAGITIS PRESENCE NOT SPECIFIED: Primary | ICD-10-CM

## 2019-05-08 PROCEDURE — 99213 OFFICE O/P EST LOW 20 MIN: CPT | Performed by: NURSE PRACTITIONER

## 2019-05-08 RX ORDER — OMEPRAZOLE 40 MG/1
40 CAPSULE, DELAYED RELEASE ORAL EVERY MORNING
Qty: 90 CAPSULE | Refills: 3 | Status: SHIPPED | OUTPATIENT
Start: 2019-05-08 | End: 2020-05-22 | Stop reason: SDUPTHER

## 2019-05-08 NOTE — PROGRESS NOTES
"Chief Complaint   Patient presents with   • Heartburn       Елена Beach is a  74 y.o. female here for a follow up visit for GERD.    HPI  75 yo f presents today for follow up visit for GERD. She is a patient of Dr. Marshall. She was last seen in the office on 3/2017. She has hx GERD and does well with omeprazole 40 mg daily. She denies any breakthrough reflux episodes. She also has hx celiac disease and admits she works hard to avoid all wheat. She also has hx MATT and takes daily iron. Hemoglobin is stable. Patient denies any dysphagia, reflux, abd pain, N&V, diarrhea, constipation, rectal bleeding or melena. She admits her appetite is good and her weight is stable.     Last EGD and Colonoscopy done in 2016. Screening colonoscopy due in 2026.   Past Medical History:   Diagnosis Date   • Anemia    • Arthritis    • Asthma    • Back pain    • Bursitis 1995   • COPD (chronic obstructive pulmonary disease) (CMS/[facility])    • Fibromyalgia    • Fibromyositis 1995   • GERD (gastroesophageal reflux disease)    • Hiatal hernia    • History of pneumonia    • Hyperlipidemia    • Knee pain    • Osteoarthritis 2008   • Osteoporosis    • PONV (postoperative nausea and vomiting)    • Scoliosis 1995       Past Surgical History:   Procedure Laterality Date   • COLONOSCOPY     • ENDOSCOPY     • FOOT SURGERY Right 2006   • HAND SURGERY  2004   • SHOULDER ARTHROSCOPY  2014   • SHOULDER SURGERY Left    • TOE SURGERY Right    • TOOTH EXTRACTION     • TOTAL KNEE ARTHROPLASTY Right 12/27/2018    Procedure: TOTAL KNEE ARTHROPLASTY;  Surgeon: Gerry Estevez MD;  Location: [facility];  Service: Orthopedics   • TUBAL ABDOMINAL LIGATION     • WRIST SURGERY         Scheduled Meds:    Continuous Infusions:  No current facility-administered medications for this visit.     PRN Meds:.    Allergies   Allergen Reactions   • Fish-Derived Products Nausea And Vomiting     Pt states \"severe stomach pains, N/V\"   • Codeine Other (See Comments)     " "Keeps awake   • Ibuprofen Other (See Comments)     Can't take due to hiatal hernia   • Keflex [Cephalexin] Other (See Comments)     Makes feel \"goofy\"   • Pseudoephedrine Other (See Comments)     Heart palpitations       Social History     Socioeconomic History   • Marital status: Single     Spouse name: Not on file   • Number of children: 1   • Years of education: Not on file   • Highest education level: Not on file   Occupational History   • Occupation: [employer]     Employer: [employer]   Tobacco Use   • Smoking status: Former Smoker     Packs/day: 1.00     Years: 40.00     Pack years: 40.00     Types: Cigarettes     Last attempt to quit: 2016     Years since quittin.5   • Smokeless tobacco: Never Used   Substance and Sexual Activity   • Alcohol use: Yes     Alcohol/week: 0.6 oz     Types: 1 Shots of liquor per week   • Drug use: No   • Sexual activity: Not Currently     Partners: Male     Birth control/protection: Other       Family History   Problem Relation Age of Onset   • Heart failure Mother    • Hypertension Mother    • Cancer Mother         breast   • Cancer Father         prostate   • Malig Hyperthermia Neg Hx        Review of Systems   Constitutional: Negative for appetite change, chills, diaphoresis, fatigue, fever and unexpected weight change.   HENT: Negative for nosebleeds, postnasal drip, sore throat, trouble swallowing and voice change.    Respiratory: Negative for cough, choking, chest tightness, shortness of breath and wheezing.    Cardiovascular: Negative for chest pain.   Gastrointestinal: Negative for abdominal distention, abdominal pain, anal bleeding, blood in stool, constipation, diarrhea, nausea, rectal pain and vomiting.   Endocrine: Negative for polydipsia, polyphagia and polyuria.   Musculoskeletal: Negative for gait problem.   Skin: Negative for rash and wound.   Allergic/Immunologic: Negative for food allergies.   Neurological: Negative for dizziness, speech difficulty and " light-headedness.   Psychiatric/Behavioral: Negative for confusion, self-injury, sleep disturbance and suicidal ideas.       Vitals:    05/08/19 1428   BP: 112/64   Temp: 98.4 °F (36.9 °C)       Physical Exam   Constitutional: She is oriented to person, place, and time. She appears well-developed and well-nourished. She does not appear ill. No distress.   HENT:   Head: Normocephalic.   Eyes: Pupils are equal, round, and reactive to light.   Cardiovascular: Normal rate, regular rhythm and normal heart sounds.   Pulmonary/Chest: Effort normal and breath sounds normal.   Abdominal: Soft. Bowel sounds are normal. She exhibits no distension and no mass. There is no hepatosplenomegaly. There is no tenderness. There is no rebound and no guarding. No hernia.   Musculoskeletal: Normal range of motion.   Neurological: She is alert and oriented to person, place, and time.   Skin: Skin is warm and dry.   Psychiatric: She has a normal mood and affect. Her speech is normal and behavior is normal. Judgment normal.       No images are attached to the encounter.    Assessment & Plan    1. Gastroesophageal reflux disease, esophagitis presence not specified    2. Iron deficiency anemia, unspecified iron deficiency anemia type    3. Celiac disease    GERD is well controlled with Omeprazole 40 mg daily. Will refill x 1 year. MATT is stable with most recent Hgb 12.2. Continue Iron. Continue gluten free diet. Call office with any issues. Follow up with me in 1 year.

## 2019-05-16 ENCOUNTER — APPOINTMENT (OUTPATIENT)
Dept: GENERAL RADIOLOGY | Facility: HOSPITAL | Age: 74
End: 2019-05-16

## 2019-05-16 ENCOUNTER — HOSPITAL ENCOUNTER (EMERGENCY)
Facility: HOSPITAL | Age: 74
Discharge: HOME | End: 2019-05-16
Attending: EMERGENCY MEDICINE | Admitting: EMERGENCY MEDICINE

## 2019-05-16 VITALS
DIASTOLIC BLOOD PRESSURE: 71 MMHG | BODY MASS INDEX: 19.51 KG/M2 | TEMPERATURE: 99.9 F | WEIGHT: 106 LBS | HEIGHT: 62 IN | SYSTOLIC BLOOD PRESSURE: 99 MMHG | OXYGEN SATURATION: 93 % | RESPIRATION RATE: 18 BRPM | HEART RATE: 99 BPM

## 2019-05-16 DIAGNOSIS — J44.1 COPD EXACERBATION (HCC): Primary | ICD-10-CM

## 2019-05-16 LAB
ALBUMIN SERPL-MCNC: 3.9 G/DL (ref 3.5–5.2)
ALBUMIN/GLOB SERPL: 1.2 G/DL
ALP SERPL-CCNC: 134 U/L (ref 39–117)
ALT SERPL W P-5'-P-CCNC: 21 U/L (ref 1–33)
ANION GAP SERPL CALCULATED.3IONS-SCNC: 11.4 MMOL/L
AST SERPL-CCNC: 27 U/L (ref 1–32)
BASOPHILS # BLD AUTO: 0.07 10*3/MM3 (ref 0–0.2)
BASOPHILS NFR BLD AUTO: 0.6 % (ref 0–1.5)
BILIRUB SERPL-MCNC: 0.5 MG/DL (ref 0.2–1.2)
BUN BLD-MCNC: 7 MG/DL (ref 8–23)
BUN/CREAT SERPL: 11.7 (ref 7–25)
CALCIUM SPEC-SCNC: 9.6 MG/DL (ref 8.6–10.5)
CHLORIDE SERPL-SCNC: 101 MMOL/L (ref 98–107)
CO2 SERPL-SCNC: 25.6 MMOL/L (ref 22–29)
CREAT BLD-MCNC: 0.6 MG/DL (ref 0.57–1)
D DIMER PPP FEU-MCNC: 0.38 MCGFEU/ML (ref 0–0.49)
D-LACTATE SERPL-SCNC: 1.4 MMOL/L (ref 0.5–2)
DEPRECATED RDW RBC AUTO: 48.1 FL (ref 37–54)
EOSINOPHIL # BLD AUTO: 0.48 10*3/MM3 (ref 0–0.4)
EOSINOPHIL NFR BLD AUTO: 3.8 % (ref 0.3–6.2)
ERYTHROCYTE [DISTWIDTH] IN BLOOD BY AUTOMATED COUNT: 15 % (ref 12.3–15.4)
GFR SERPL CREATININE-BSD FRML MDRD: 98 ML/MIN/1.73
GLOBULIN UR ELPH-MCNC: 3.3 GM/DL
GLUCOSE BLD-MCNC: 126 MG/DL (ref 65–99)
HCT VFR BLD AUTO: 40.2 % (ref 34–46.6)
HGB BLD-MCNC: 12.5 G/DL (ref 12–15.9)
IMM GRANULOCYTES # BLD AUTO: 0.04 10*3/MM3 (ref 0–0.05)
IMM GRANULOCYTES NFR BLD AUTO: 0.3 % (ref 0–0.5)
LYMPHOCYTES # BLD AUTO: 1.6 10*3/MM3 (ref 0.7–3.1)
LYMPHOCYTES NFR BLD AUTO: 12.6 % (ref 19.6–45.3)
MAGNESIUM SERPL-MCNC: 2.2 MG/DL (ref 1.6–2.4)
MCH RBC QN AUTO: 27.1 PG (ref 26.6–33)
MCHC RBC AUTO-ENTMCNC: 31.1 G/DL (ref 31.5–35.7)
MCV RBC AUTO: 87.2 FL (ref 79–97)
MONOCYTES # BLD AUTO: 1.03 10*3/MM3 (ref 0.1–0.9)
MONOCYTES NFR BLD AUTO: 8.1 % (ref 5–12)
NEUTROPHILS # BLD AUTO: 9.49 10*3/MM3 (ref 1.7–7)
NEUTROPHILS NFR BLD AUTO: 74.6 % (ref 42.7–76)
NRBC BLD AUTO-RTO: 0 /100 WBC (ref 0–0.2)
NT-PROBNP SERPL-MCNC: 56 PG/ML (ref 5–900)
PLATELET # BLD AUTO: 373 10*3/MM3 (ref 140–450)
PMV BLD AUTO: 9.8 FL (ref 6–12)
POTASSIUM BLD-SCNC: 4.1 MMOL/L (ref 3.5–5.2)
PROCALCITONIN SERPL-MCNC: 0.08 NG/ML (ref 0.1–0.25)
PROT SERPL-MCNC: 7.2 G/DL (ref 6–8.5)
RBC # BLD AUTO: 4.61 10*6/MM3 (ref 3.77–5.28)
SODIUM BLD-SCNC: 138 MMOL/L (ref 136–145)
TROPONIN T SERPL-MCNC: <0.01 NG/ML (ref 0–0.03)
WBC NRBC COR # BLD: 12.71 10*3/MM3 (ref 3.4–10.8)

## 2019-05-16 PROCEDURE — 87040 BLOOD CULTURE FOR BACTERIA: CPT | Performed by: NURSE PRACTITIONER

## 2019-05-16 PROCEDURE — 83735 ASSAY OF MAGNESIUM: CPT | Performed by: NURSE PRACTITIONER

## 2019-05-16 PROCEDURE — 84484 ASSAY OF TROPONIN QUANT: CPT | Performed by: NURSE PRACTITIONER

## 2019-05-16 PROCEDURE — 93005 ELECTROCARDIOGRAM TRACING: CPT | Performed by: NURSE PRACTITIONER

## 2019-05-16 PROCEDURE — 83605 ASSAY OF LACTIC ACID: CPT | Performed by: NURSE PRACTITIONER

## 2019-05-16 PROCEDURE — 85379 FIBRIN DEGRADATION QUANT: CPT | Performed by: NURSE PRACTITIONER

## 2019-05-16 PROCEDURE — 84145 PROCALCITONIN (PCT): CPT | Performed by: NURSE PRACTITIONER

## 2019-05-16 PROCEDURE — 99284 EMERGENCY DEPT VISIT MOD MDM: CPT

## 2019-05-16 PROCEDURE — 25010000002 METHYLPREDNISOLONE PER 125 MG: Performed by: NURSE PRACTITIONER

## 2019-05-16 PROCEDURE — 80053 COMPREHEN METABOLIC PANEL: CPT | Performed by: NURSE PRACTITIONER

## 2019-05-16 PROCEDURE — 94640 AIRWAY INHALATION TREATMENT: CPT

## 2019-05-16 PROCEDURE — 85025 COMPLETE CBC W/AUTO DIFF WBC: CPT | Performed by: NURSE PRACTITIONER

## 2019-05-16 PROCEDURE — 36415 COLL VENOUS BLD VENIPUNCTURE: CPT

## 2019-05-16 PROCEDURE — 96374 THER/PROPH/DIAG INJ IV PUSH: CPT

## 2019-05-16 PROCEDURE — 83880 ASSAY OF NATRIURETIC PEPTIDE: CPT | Performed by: NURSE PRACTITIONER

## 2019-05-16 PROCEDURE — 71046 X-RAY EXAM CHEST 2 VIEWS: CPT

## 2019-05-16 PROCEDURE — 93010 ELECTROCARDIOGRAM REPORT: CPT | Performed by: INTERNAL MEDICINE

## 2019-05-16 RX ORDER — ALBUTEROL SULFATE 2.5 MG/3ML
2.5 SOLUTION RESPIRATORY (INHALATION) ONCE
Status: COMPLETED | OUTPATIENT
Start: 2019-05-16 | End: 2019-05-16

## 2019-05-16 RX ORDER — METHYLPREDNISOLONE 4 MG/1
TABLET ORAL
Qty: 1 EACH | Refills: 0 | Status: SHIPPED | OUTPATIENT
Start: 2019-05-16 | End: 2020-01-27

## 2019-05-16 RX ORDER — SODIUM CHLORIDE 0.9 % (FLUSH) 0.9 %
10 SYRINGE (ML) INJECTION AS NEEDED
Status: DISCONTINUED | OUTPATIENT
Start: 2019-05-16 | End: 2019-05-16 | Stop reason: HOSPADM

## 2019-05-16 RX ORDER — METHYLPREDNISOLONE SODIUM SUCCINATE 125 MG/2ML
125 INJECTION, POWDER, LYOPHILIZED, FOR SOLUTION INTRAMUSCULAR; INTRAVENOUS ONCE
Status: COMPLETED | OUTPATIENT
Start: 2019-05-16 | End: 2019-05-16

## 2019-05-16 RX ADMIN — METHYLPREDNISOLONE SODIUM SUCCINATE 125 MG: 125 INJECTION, POWDER, FOR SOLUTION INTRAMUSCULAR; INTRAVENOUS at 12:38

## 2019-05-16 RX ADMIN — ALBUTEROL SULFATE 2.5 MG: 2.5 SOLUTION RESPIRATORY (INHALATION) at 13:06

## 2019-05-17 ENCOUNTER — OFFICE VISIT (OUTPATIENT)
Dept: ORTHOPEDIC SURGERY | Facility: CLINIC | Age: 74
End: 2019-05-17

## 2019-05-17 VITALS — BODY MASS INDEX: 19.51 KG/M2 | WEIGHT: 106 LBS | HEIGHT: 62 IN | TEMPERATURE: 97.2 F

## 2019-05-17 DIAGNOSIS — Z09 SURGERY FOLLOW-UP: Primary | ICD-10-CM

## 2019-05-17 PROCEDURE — 99212 OFFICE O/P EST SF 10 MIN: CPT | Performed by: ORTHOPAEDIC SURGERY

## 2019-05-18 ENCOUNTER — APPOINTMENT (OUTPATIENT)
Dept: GENERAL RADIOLOGY | Facility: HOSPITAL | Age: 74
End: 2019-05-18

## 2019-05-18 ENCOUNTER — HOSPITAL ENCOUNTER (INPATIENT)
Facility: HOSPITAL | Age: 74
LOS: 2 days | Discharge: HOME | End: 2019-05-20
Attending: EMERGENCY MEDICINE | Admitting: INTERNAL MEDICINE

## 2019-05-18 DIAGNOSIS — R06.03 RESPIRATORY DISTRESS: ICD-10-CM

## 2019-05-18 DIAGNOSIS — R77.8 ELEVATED TROPONIN: ICD-10-CM

## 2019-05-18 DIAGNOSIS — J44.9 CHRONIC OBSTRUCTIVE PULMONARY DISEASE, UNSPECIFIED COPD TYPE (HCC): Primary | ICD-10-CM

## 2019-05-18 LAB
ALBUMIN SERPL-MCNC: 3.8 G/DL (ref 3.5–5.2)
ALBUMIN/GLOB SERPL: 1.2 G/DL
ALP SERPL-CCNC: 115 U/L (ref 39–117)
ALT SERPL W P-5'-P-CCNC: 19 U/L (ref 1–33)
ANION GAP SERPL CALCULATED.3IONS-SCNC: 11.7 MMOL/L
AST SERPL-CCNC: 27 U/L (ref 1–32)
B PARAPERT DNA SPEC QL NAA+PROBE: NOT DETECTED
B PERT DNA SPEC QL NAA+PROBE: NOT DETECTED
BASOPHILS # BLD AUTO: 0.01 10*3/MM3 (ref 0–0.2)
BASOPHILS NFR BLD AUTO: 0.1 % (ref 0–1.5)
BILIRUB SERPL-MCNC: 0.4 MG/DL (ref 0.2–1.2)
BUN BLD-MCNC: 8 MG/DL (ref 8–23)
BUN/CREAT SERPL: 17.4 (ref 7–25)
C PNEUM DNA NPH QL NAA+NON-PROBE: NOT DETECTED
CALCIUM SPEC-SCNC: 8.8 MG/DL (ref 8.6–10.5)
CHLORIDE SERPL-SCNC: 98 MMOL/L (ref 98–107)
CO2 SERPL-SCNC: 25.3 MMOL/L (ref 22–29)
CREAT BLD-MCNC: 0.46 MG/DL (ref 0.57–1)
D-LACTATE SERPL-SCNC: 1.7 MMOL/L (ref 0.5–2)
DEPRECATED RDW RBC AUTO: 48.6 FL (ref 37–54)
EOSINOPHIL # BLD AUTO: 0 10*3/MM3 (ref 0–0.4)
EOSINOPHIL NFR BLD AUTO: 0 % (ref 0.3–6.2)
ERYTHROCYTE [DISTWIDTH] IN BLOOD BY AUTOMATED COUNT: 15.7 % (ref 12.3–15.4)
FLUAV H1 2009 PAND RNA NPH QL NAA+PROBE: NOT DETECTED
FLUAV H1 HA GENE NPH QL NAA+PROBE: NOT DETECTED
FLUAV H3 RNA NPH QL NAA+PROBE: NOT DETECTED
FLUAV SUBTYP SPEC NAA+PROBE: NOT DETECTED
FLUBV RNA ISLT QL NAA+PROBE: NOT DETECTED
GFR SERPL CREATININE-BSD FRML MDRD: 133 ML/MIN/1.73
GLOBULIN UR ELPH-MCNC: 3.1 GM/DL
GLUCOSE BLD-MCNC: 129 MG/DL (ref 65–99)
HADV DNA SPEC NAA+PROBE: NOT DETECTED
HCOV 229E RNA SPEC QL NAA+PROBE: NOT DETECTED
HCOV HKU1 RNA SPEC QL NAA+PROBE: NOT DETECTED
HCOV NL63 RNA SPEC QL NAA+PROBE: NOT DETECTED
HCOV OC43 RNA SPEC QL NAA+PROBE: NOT DETECTED
HCT VFR BLD AUTO: 36.1 % (ref 34–46.6)
HGB BLD-MCNC: 11.5 G/DL (ref 12–15.9)
HMPV RNA NPH QL NAA+NON-PROBE: NOT DETECTED
HPIV1 RNA SPEC QL NAA+PROBE: NOT DETECTED
HPIV2 RNA SPEC QL NAA+PROBE: NOT DETECTED
HPIV3 RNA NPH QL NAA+PROBE: NOT DETECTED
HPIV4 P GENE NPH QL NAA+PROBE: NOT DETECTED
IMM GRANULOCYTES # BLD AUTO: 0.02 10*3/MM3 (ref 0–0.05)
IMM GRANULOCYTES NFR BLD AUTO: 0.3 % (ref 0–0.5)
LYMPHOCYTES # BLD AUTO: 1.03 10*3/MM3 (ref 0.7–3.1)
LYMPHOCYTES NFR BLD AUTO: 15.3 % (ref 19.6–45.3)
M PNEUMO IGG SER IA-ACNC: NOT DETECTED
MCH RBC QN AUTO: 27.4 PG (ref 26.6–33)
MCHC RBC AUTO-ENTMCNC: 31.9 G/DL (ref 31.5–35.7)
MCV RBC AUTO: 86 FL (ref 79–97)
MONOCYTES # BLD AUTO: 0.74 10*3/MM3 (ref 0.1–0.9)
MONOCYTES NFR BLD AUTO: 11 % (ref 5–12)
NEUTROPHILS # BLD AUTO: 4.95 10*3/MM3 (ref 1.7–7)
NEUTROPHILS NFR BLD AUTO: 73.3 % (ref 42.7–76)
NRBC BLD AUTO-RTO: 0 /100 WBC (ref 0–0.2)
NT-PROBNP SERPL-MCNC: 196.5 PG/ML (ref 5–900)
PLATELET # BLD AUTO: 377 10*3/MM3 (ref 140–450)
PMV BLD AUTO: 9.7 FL (ref 6–12)
POTASSIUM BLD-SCNC: 3.6 MMOL/L (ref 3.5–5.2)
PROCALCITONIN SERPL-MCNC: 0.11 NG/ML (ref 0.1–0.25)
PROT SERPL-MCNC: 6.9 G/DL (ref 6–8.5)
RBC # BLD AUTO: 4.2 10*6/MM3 (ref 3.77–5.28)
RHINOVIRUS RNA SPEC NAA+PROBE: NOT DETECTED
RSV RNA NPH QL NAA+NON-PROBE: NOT DETECTED
SODIUM BLD-SCNC: 135 MMOL/L (ref 136–145)
TROPONIN T SERPL-MCNC: 0.06 NG/ML (ref 0–0.03)
TROPONIN T SERPL-MCNC: 0.06 NG/ML (ref 0–0.03)
WBC NRBC COR # BLD: 6.75 10*3/MM3 (ref 3.4–10.8)

## 2019-05-18 PROCEDURE — 85025 COMPLETE CBC W/AUTO DIFF WBC: CPT | Performed by: NURSE PRACTITIONER

## 2019-05-18 PROCEDURE — 71046 X-RAY EXAM CHEST 2 VIEWS: CPT

## 2019-05-18 PROCEDURE — 84145 PROCALCITONIN (PCT): CPT | Performed by: NURSE PRACTITIONER

## 2019-05-18 PROCEDURE — 63710000001 PREDNISONE PER 1 MG: Performed by: INTERNAL MEDICINE

## 2019-05-18 PROCEDURE — 87486 CHLMYD PNEUM DNA AMP PROBE: CPT | Performed by: NURSE PRACTITIONER

## 2019-05-18 PROCEDURE — 83605 ASSAY OF LACTIC ACID: CPT | Performed by: NURSE PRACTITIONER

## 2019-05-18 PROCEDURE — 94799 UNLISTED PULMONARY SVC/PX: CPT

## 2019-05-18 PROCEDURE — 87633 RESP VIRUS 12-25 TARGETS: CPT | Performed by: NURSE PRACTITIONER

## 2019-05-18 PROCEDURE — 93005 ELECTROCARDIOGRAM TRACING: CPT | Performed by: NURSE PRACTITIONER

## 2019-05-18 PROCEDURE — 99285 EMERGENCY DEPT VISIT HI MDM: CPT

## 2019-05-18 PROCEDURE — 84484 ASSAY OF TROPONIN QUANT: CPT | Performed by: INTERNAL MEDICINE

## 2019-05-18 PROCEDURE — 84484 ASSAY OF TROPONIN QUANT: CPT | Performed by: NURSE PRACTITIONER

## 2019-05-18 PROCEDURE — 80053 COMPREHEN METABOLIC PANEL: CPT | Performed by: NURSE PRACTITIONER

## 2019-05-18 PROCEDURE — 93010 ELECTROCARDIOGRAM REPORT: CPT | Performed by: INTERNAL MEDICINE

## 2019-05-18 PROCEDURE — 25010000002 METHYLPREDNISOLONE PER 125 MG: Performed by: NURSE PRACTITIONER

## 2019-05-18 PROCEDURE — 94640 AIRWAY INHALATION TREATMENT: CPT

## 2019-05-18 PROCEDURE — 87581 M.PNEUMON DNA AMP PROBE: CPT | Performed by: NURSE PRACTITIONER

## 2019-05-18 PROCEDURE — 83880 ASSAY OF NATRIURETIC PEPTIDE: CPT | Performed by: NURSE PRACTITIONER

## 2019-05-18 PROCEDURE — 87798 DETECT AGENT NOS DNA AMP: CPT | Performed by: NURSE PRACTITIONER

## 2019-05-18 RX ORDER — ALBUTEROL SULFATE 2.5 MG/3ML
2.5 SOLUTION RESPIRATORY (INHALATION)
Status: COMPLETED | OUTPATIENT
Start: 2019-05-18 | End: 2019-05-18

## 2019-05-18 RX ORDER — METHYLPREDNISOLONE SODIUM SUCCINATE 125 MG/2ML
125 INJECTION, POWDER, LYOPHILIZED, FOR SOLUTION INTRAMUSCULAR; INTRAVENOUS ONCE
Status: COMPLETED | OUTPATIENT
Start: 2019-05-18 | End: 2019-05-18

## 2019-05-18 RX ORDER — DULOXETIN HYDROCHLORIDE 60 MG/1
60 CAPSULE, DELAYED RELEASE ORAL DAILY
Status: DISCONTINUED | OUTPATIENT
Start: 2019-05-18 | End: 2019-05-20 | Stop reason: HOSPADM

## 2019-05-18 RX ORDER — ASPIRIN 325 MG
325 TABLET, DELAYED RELEASE (ENTERIC COATED) ORAL DAILY
Status: DISCONTINUED | OUTPATIENT
Start: 2019-05-18 | End: 2019-05-20 | Stop reason: HOSPADM

## 2019-05-18 RX ORDER — IPRATROPIUM BROMIDE AND ALBUTEROL SULFATE 2.5; .5 MG/3ML; MG/3ML
3 SOLUTION RESPIRATORY (INHALATION)
Status: DISCONTINUED | OUTPATIENT
Start: 2019-05-18 | End: 2019-05-20 | Stop reason: HOSPADM

## 2019-05-18 RX ORDER — PANTOPRAZOLE SODIUM 40 MG/1
40 TABLET, DELAYED RELEASE ORAL EVERY MORNING
Status: DISCONTINUED | OUTPATIENT
Start: 2019-05-19 | End: 2019-05-20 | Stop reason: HOSPADM

## 2019-05-18 RX ORDER — DOXYCYCLINE 100 MG/1
100 CAPSULE ORAL EVERY 12 HOURS SCHEDULED
Status: DISCONTINUED | OUTPATIENT
Start: 2019-05-18 | End: 2019-05-20 | Stop reason: HOSPADM

## 2019-05-18 RX ORDER — GUAIFENESIN 600 MG/1
1200 TABLET, EXTENDED RELEASE ORAL 2 TIMES DAILY
Status: DISCONTINUED | OUTPATIENT
Start: 2019-05-18 | End: 2019-05-20 | Stop reason: HOSPADM

## 2019-05-18 RX ORDER — PREDNISONE 20 MG/1
40 TABLET ORAL
Status: DISCONTINUED | OUTPATIENT
Start: 2019-05-18 | End: 2019-05-20 | Stop reason: HOSPADM

## 2019-05-18 RX ORDER — ATORVASTATIN CALCIUM 10 MG/1
10 TABLET, FILM COATED ORAL NIGHTLY
Status: DISCONTINUED | OUTPATIENT
Start: 2019-05-18 | End: 2019-05-20 | Stop reason: HOSPADM

## 2019-05-18 RX ORDER — FLUTICASONE PROPIONATE 50 MCG
2 SPRAY, SUSPENSION (ML) NASAL DAILY
Status: DISCONTINUED | OUTPATIENT
Start: 2019-05-18 | End: 2019-05-20 | Stop reason: HOSPADM

## 2019-05-18 RX ORDER — MONTELUKAST SODIUM 10 MG/1
10 TABLET ORAL NIGHTLY
Status: DISCONTINUED | OUTPATIENT
Start: 2019-05-18 | End: 2019-05-20 | Stop reason: HOSPADM

## 2019-05-18 RX ORDER — ECHINACEA PURPUREA EXTRACT 125 MG
1 TABLET ORAL AS NEEDED
Status: DISCONTINUED | OUTPATIENT
Start: 2019-05-18 | End: 2019-05-20 | Stop reason: HOSPADM

## 2019-05-18 RX ORDER — ARFORMOTEROL TARTRATE 15 UG/2ML
15 SOLUTION RESPIRATORY (INHALATION)
Status: DISCONTINUED | OUTPATIENT
Start: 2019-05-18 | End: 2019-05-20 | Stop reason: HOSPADM

## 2019-05-18 RX ADMIN — IPRATROPIUM BROMIDE AND ALBUTEROL SULFATE 3 ML: 2.5; .5 SOLUTION RESPIRATORY (INHALATION) at 20:30

## 2019-05-18 RX ADMIN — ARFORMOTEROL TARTRATE 15 MCG: 15 SOLUTION RESPIRATORY (INHALATION) at 15:59

## 2019-05-18 RX ADMIN — ALBUTEROL SULFATE 2.5 MG: 2.5 SOLUTION RESPIRATORY (INHALATION) at 08:43

## 2019-05-18 RX ADMIN — PREDNISONE 40 MG: 20 TABLET ORAL at 15:06

## 2019-05-18 RX ADMIN — DULOXETINE HYDROCHLORIDE 60 MG: 60 CAPSULE, DELAYED RELEASE ORAL at 15:06

## 2019-05-18 RX ADMIN — ALBUTEROL SULFATE 2.5 MG: 2.5 SOLUTION RESPIRATORY (INHALATION) at 07:58

## 2019-05-18 RX ADMIN — ASPIRIN 325 MG: 325 TABLET, COATED ORAL at 15:06

## 2019-05-18 RX ADMIN — MONTELUKAST SODIUM 10 MG: 10 TABLET, FILM COATED ORAL at 20:53

## 2019-05-18 RX ADMIN — DOXYCYCLINE 100 MG: 100 CAPSULE ORAL at 15:05

## 2019-05-18 RX ADMIN — FLUTICASONE PROPIONATE 2 SPRAY: 50 SPRAY, METERED NASAL at 15:06

## 2019-05-18 RX ADMIN — DOXYCYCLINE 100 MG: 100 CAPSULE ORAL at 20:53

## 2019-05-18 RX ADMIN — ATORVASTATIN CALCIUM 10 MG: 10 TABLET, FILM COATED ORAL at 20:53

## 2019-05-18 RX ADMIN — METHYLPREDNISOLONE SODIUM SUCCINATE 125 MG: 125 INJECTION, POWDER, FOR SOLUTION INTRAMUSCULAR; INTRAVENOUS at 07:52

## 2019-05-18 RX ADMIN — GUAIFENESIN 1200 MG: 600 TABLET, EXTENDED RELEASE ORAL at 20:53

## 2019-05-19 LAB
ALBUMIN SERPL-MCNC: 3.4 G/DL (ref 3.5–5.2)
ANION GAP SERPL CALCULATED.3IONS-SCNC: 10.9 MMOL/L
BASOPHILS # BLD AUTO: 0.01 10*3/MM3 (ref 0–0.2)
BASOPHILS NFR BLD AUTO: 0.2 % (ref 0–1.5)
BUN BLD-MCNC: 9 MG/DL (ref 8–23)
BUN/CREAT SERPL: 25 (ref 7–25)
CALCIUM SPEC-SCNC: 9.2 MG/DL (ref 8.6–10.5)
CHLORIDE SERPL-SCNC: 97 MMOL/L (ref 98–107)
CO2 SERPL-SCNC: 26.1 MMOL/L (ref 22–29)
CREAT BLD-MCNC: 0.36 MG/DL (ref 0.57–1)
DEPRECATED RDW RBC AUTO: 48.2 FL (ref 37–54)
EOSINOPHIL # BLD AUTO: 0 10*3/MM3 (ref 0–0.4)
EOSINOPHIL NFR BLD AUTO: 0 % (ref 0.3–6.2)
ERYTHROCYTE [DISTWIDTH] IN BLOOD BY AUTOMATED COUNT: 15.4 % (ref 12.3–15.4)
GFR SERPL CREATININE-BSD FRML MDRD: >150 ML/MIN/1.73
GLUCOSE BLD-MCNC: 129 MG/DL (ref 65–99)
HCT VFR BLD AUTO: 38.4 % (ref 34–46.6)
HGB BLD-MCNC: 12.1 G/DL (ref 12–15.9)
IMM GRANULOCYTES # BLD AUTO: 0.01 10*3/MM3 (ref 0–0.05)
IMM GRANULOCYTES NFR BLD AUTO: 0.2 % (ref 0–0.5)
LYMPHOCYTES # BLD AUTO: 0.71 10*3/MM3 (ref 0.7–3.1)
LYMPHOCYTES NFR BLD AUTO: 11.5 % (ref 19.6–45.3)
MCH RBC QN AUTO: 27.2 PG (ref 26.6–33)
MCHC RBC AUTO-ENTMCNC: 31.5 G/DL (ref 31.5–35.7)
MCV RBC AUTO: 86.3 FL (ref 79–97)
MONOCYTES # BLD AUTO: 0.88 10*3/MM3 (ref 0.1–0.9)
MONOCYTES NFR BLD AUTO: 14.2 % (ref 5–12)
NEUTROPHILS # BLD AUTO: 4.57 10*3/MM3 (ref 1.7–7)
NEUTROPHILS NFR BLD AUTO: 73.9 % (ref 42.7–76)
NRBC BLD AUTO-RTO: 0 /100 WBC (ref 0–0.2)
PHOSPHATE SERPL-MCNC: 3.2 MG/DL (ref 2.5–4.5)
PLATELET # BLD AUTO: 399 10*3/MM3 (ref 140–450)
PMV BLD AUTO: 9.7 FL (ref 6–12)
POTASSIUM BLD-SCNC: 4.1 MMOL/L (ref 3.5–5.2)
PROCALCITONIN SERPL-MCNC: 0.11 NG/ML (ref 0.1–0.25)
RBC # BLD AUTO: 4.45 10*6/MM3 (ref 3.77–5.28)
SODIUM BLD-SCNC: 134 MMOL/L (ref 136–145)
WBC NRBC COR # BLD: 6.18 10*3/MM3 (ref 3.4–10.8)

## 2019-05-19 PROCEDURE — 85025 COMPLETE CBC W/AUTO DIFF WBC: CPT | Performed by: INTERNAL MEDICINE

## 2019-05-19 PROCEDURE — 63710000001 PREDNISONE PER 1 MG: Performed by: INTERNAL MEDICINE

## 2019-05-19 PROCEDURE — 25010000002 ENOXAPARIN PER 10 MG: Performed by: INTERNAL MEDICINE

## 2019-05-19 PROCEDURE — 94799 UNLISTED PULMONARY SVC/PX: CPT

## 2019-05-19 PROCEDURE — 80069 RENAL FUNCTION PANEL: CPT | Performed by: INTERNAL MEDICINE

## 2019-05-19 PROCEDURE — 84145 PROCALCITONIN (PCT): CPT | Performed by: INTERNAL MEDICINE

## 2019-05-19 RX ADMIN — PANTOPRAZOLE SODIUM 40 MG: 40 TABLET, DELAYED RELEASE ORAL at 06:28

## 2019-05-19 RX ADMIN — DOXYCYCLINE 100 MG: 100 CAPSULE ORAL at 22:27

## 2019-05-19 RX ADMIN — FLUTICASONE PROPIONATE 2 SPRAY: 50 SPRAY, METERED NASAL at 09:27

## 2019-05-19 RX ADMIN — MONTELUKAST SODIUM 10 MG: 10 TABLET, FILM COATED ORAL at 22:27

## 2019-05-19 RX ADMIN — DOXYCYCLINE 100 MG: 100 CAPSULE ORAL at 09:26

## 2019-05-19 RX ADMIN — PREDNISONE 40 MG: 20 TABLET ORAL at 09:26

## 2019-05-19 RX ADMIN — IPRATROPIUM BROMIDE AND ALBUTEROL SULFATE 3 ML: 2.5; .5 SOLUTION RESPIRATORY (INHALATION) at 10:38

## 2019-05-19 RX ADMIN — ARFORMOTEROL TARTRATE 15 MCG: 15 SOLUTION RESPIRATORY (INHALATION) at 20:02

## 2019-05-19 RX ADMIN — GUAIFENESIN 1200 MG: 600 TABLET, EXTENDED RELEASE ORAL at 09:26

## 2019-05-19 RX ADMIN — ARFORMOTEROL TARTRATE 15 MCG: 15 SOLUTION RESPIRATORY (INHALATION) at 06:54

## 2019-05-19 RX ADMIN — DULOXETINE HYDROCHLORIDE 60 MG: 60 CAPSULE, DELAYED RELEASE ORAL at 09:26

## 2019-05-19 RX ADMIN — IPRATROPIUM BROMIDE AND ALBUTEROL SULFATE 3 ML: 2.5; .5 SOLUTION RESPIRATORY (INHALATION) at 14:35

## 2019-05-19 RX ADMIN — GUAIFENESIN 1200 MG: 600 TABLET, EXTENDED RELEASE ORAL at 22:28

## 2019-05-19 RX ADMIN — ENOXAPARIN SODIUM 40 MG: 40 INJECTION SUBCUTANEOUS at 11:35

## 2019-05-19 RX ADMIN — ASPIRIN 325 MG: 325 TABLET, COATED ORAL at 09:26

## 2019-05-19 NOTE — PROGRESS NOTES
Елена Beach : 1945 MRN: [id number] DATE: 2019    DIAGNOSIS: 6 month follow up right TKA      SUBJECTIVE:  Patient returns today for 3 month follow up of right total knee replacement.  Patient reports doing well.  She still has some intermittent aching discomfort in the knee after activity but her pain is tremendously better than before surgery.  She is happy with her outcome at this point.    Vitals:    19 1125   Temp: 97.2 °F (36.2 °C)       OBJECTIVE:     Exam:  The incision is well healed. No sign of infection. Range of motion is measured at near full extension to 115 degrees of flexion. The calf is soft and nontender with a negative Homans sign.  Gait is reciprocal heel-to-toe and nonantalgic.    DIAGNOSTIC STUDIES    Xrays: None taken today    ASSESSMENT: 6 months status post right knee replacement.    PLAN: 1) Continue with PT exercises as prescribed   2) We discussed the fact that it may take her a year to fully recover from the surgery and for all of her pain to subside.  We discussed antibiotic prophylaxis recommendations.  I will see her back annually going forward.    Gerry Estevez MD  2019

## 2019-05-20 VITALS
BODY MASS INDEX: 18.66 KG/M2 | DIASTOLIC BLOOD PRESSURE: 69 MMHG | HEART RATE: 94 BPM | OXYGEN SATURATION: 93 % | RESPIRATION RATE: 18 BRPM | TEMPERATURE: 97.5 F | SYSTOLIC BLOOD PRESSURE: 110 MMHG | WEIGHT: 101.38 LBS | HEIGHT: 62 IN

## 2019-05-20 LAB
ALBUMIN SERPL-MCNC: 3.3 G/DL (ref 3.5–5.2)
ANION GAP SERPL CALCULATED.3IONS-SCNC: 10.4 MMOL/L
BASOPHILS # BLD AUTO: 0.01 10*3/MM3 (ref 0–0.2)
BASOPHILS NFR BLD AUTO: 0.2 % (ref 0–1.5)
BUN BLD-MCNC: 7 MG/DL (ref 8–23)
BUN/CREAT SERPL: 18.9 (ref 7–25)
CALCIUM SPEC-SCNC: 8.8 MG/DL (ref 8.6–10.5)
CHLORIDE SERPL-SCNC: 93 MMOL/L (ref 98–107)
CO2 SERPL-SCNC: 26.6 MMOL/L (ref 22–29)
CREAT BLD-MCNC: 0.37 MG/DL (ref 0.57–1)
DEPRECATED RDW RBC AUTO: 48.8 FL (ref 37–54)
EOSINOPHIL # BLD AUTO: 0.01 10*3/MM3 (ref 0–0.4)
EOSINOPHIL NFR BLD AUTO: 0.2 % (ref 0.3–6.2)
ERYTHROCYTE [DISTWIDTH] IN BLOOD BY AUTOMATED COUNT: 15.4 % (ref 12.3–15.4)
GFR SERPL CREATININE-BSD FRML MDRD: >150 ML/MIN/1.73
GLUCOSE BLD-MCNC: 89 MG/DL (ref 65–99)
HCT VFR BLD AUTO: 36.6 % (ref 34–46.6)
HGB BLD-MCNC: 11.6 G/DL (ref 12–15.9)
IMM GRANULOCYTES # BLD AUTO: 0.03 10*3/MM3 (ref 0–0.05)
IMM GRANULOCYTES NFR BLD AUTO: 0.5 % (ref 0–0.5)
LYMPHOCYTES # BLD AUTO: 1.45 10*3/MM3 (ref 0.7–3.1)
LYMPHOCYTES NFR BLD AUTO: 22.3 % (ref 19.6–45.3)
MCH RBC QN AUTO: 27.3 PG (ref 26.6–33)
MCHC RBC AUTO-ENTMCNC: 31.7 G/DL (ref 31.5–35.7)
MCV RBC AUTO: 86.1 FL (ref 79–97)
MONOCYTES # BLD AUTO: 0.87 10*3/MM3 (ref 0.1–0.9)
MONOCYTES NFR BLD AUTO: 13.4 % (ref 5–12)
NEUTROPHILS # BLD AUTO: 4.12 10*3/MM3 (ref 1.7–7)
NEUTROPHILS NFR BLD AUTO: 63.4 % (ref 42.7–76)
NRBC BLD AUTO-RTO: 0 /100 WBC (ref 0–0.2)
PHOSPHATE SERPL-MCNC: 3.3 MG/DL (ref 2.5–4.5)
PLATELET # BLD AUTO: 402 10*3/MM3 (ref 140–450)
PMV BLD AUTO: 9.8 FL (ref 6–12)
POTASSIUM BLD-SCNC: 3.2 MMOL/L (ref 3.5–5.2)
RBC # BLD AUTO: 4.25 10*6/MM3 (ref 3.77–5.28)
SODIUM BLD-SCNC: 130 MMOL/L (ref 136–145)
WBC NRBC COR # BLD: 6.49 10*3/MM3 (ref 3.4–10.8)

## 2019-05-20 PROCEDURE — 80069 RENAL FUNCTION PANEL: CPT | Performed by: INTERNAL MEDICINE

## 2019-05-20 PROCEDURE — 94799 UNLISTED PULMONARY SVC/PX: CPT

## 2019-05-20 PROCEDURE — 25010000002 ENOXAPARIN PER 10 MG: Performed by: INTERNAL MEDICINE

## 2019-05-20 PROCEDURE — 85025 COMPLETE CBC W/AUTO DIFF WBC: CPT | Performed by: INTERNAL MEDICINE

## 2019-05-20 PROCEDURE — 63710000001 PREDNISONE PER 1 MG: Performed by: INTERNAL MEDICINE

## 2019-05-20 RX ORDER — ARFORMOTEROL TARTRATE 15 UG/2ML
15 SOLUTION RESPIRATORY (INHALATION)
Qty: 120 ML | Refills: 0 | Status: SHIPPED | OUTPATIENT
Start: 2019-05-20 | End: 2021-01-28

## 2019-05-20 RX ORDER — DOXYCYCLINE 100 MG/1
100 CAPSULE ORAL EVERY 12 HOURS SCHEDULED
Qty: 9 CAPSULE | Refills: 0 | Status: SHIPPED | OUTPATIENT
Start: 2019-05-20 | End: 2019-05-25

## 2019-05-20 RX ORDER — IPRATROPIUM BROMIDE AND ALBUTEROL SULFATE 2.5; .5 MG/3ML; MG/3ML
3 SOLUTION RESPIRATORY (INHALATION)
Qty: 360 ML | Refills: 3 | Status: SHIPPED | OUTPATIENT
Start: 2019-05-20 | End: 2021-01-28

## 2019-05-20 RX ADMIN — IPRATROPIUM BROMIDE AND ALBUTEROL SULFATE 3 ML: 2.5; .5 SOLUTION RESPIRATORY (INHALATION) at 15:52

## 2019-05-20 RX ADMIN — ATORVASTATIN CALCIUM 10 MG: 10 TABLET, FILM COATED ORAL at 00:31

## 2019-05-20 RX ADMIN — GUAIFENESIN 1200 MG: 600 TABLET, EXTENDED RELEASE ORAL at 08:33

## 2019-05-20 RX ADMIN — DOXYCYCLINE 100 MG: 100 CAPSULE ORAL at 08:33

## 2019-05-20 RX ADMIN — ENOXAPARIN SODIUM 40 MG: 40 INJECTION SUBCUTANEOUS at 12:01

## 2019-05-20 RX ADMIN — PREDNISONE 40 MG: 20 TABLET ORAL at 08:33

## 2019-05-20 RX ADMIN — FLUTICASONE PROPIONATE 2 SPRAY: 50 SPRAY, METERED NASAL at 08:34

## 2019-05-20 RX ADMIN — PANTOPRAZOLE SODIUM 40 MG: 40 TABLET, DELAYED RELEASE ORAL at 06:25

## 2019-05-20 RX ADMIN — IPRATROPIUM BROMIDE AND ALBUTEROL SULFATE 3 ML: 2.5; .5 SOLUTION RESPIRATORY (INHALATION) at 11:07

## 2019-05-20 RX ADMIN — ARFORMOTEROL TARTRATE 15 MCG: 15 SOLUTION RESPIRATORY (INHALATION) at 07:38

## 2019-05-20 RX ADMIN — DULOXETINE HYDROCHLORIDE 60 MG: 60 CAPSULE, DELAYED RELEASE ORAL at 08:33

## 2019-05-20 RX ADMIN — ASPIRIN 325 MG: 325 TABLET, COATED ORAL at 08:33

## 2019-05-21 ENCOUNTER — READMISSION MANAGEMENT (OUTPATIENT)
Dept: CALL CENTER | Facility: HOSPITAL | Age: 74
End: 2019-05-21

## 2019-05-21 LAB
BACTERIA SPEC AEROBE CULT: NORMAL
BACTERIA SPEC AEROBE CULT: NORMAL

## 2019-05-21 NOTE — OUTREACH NOTE
Prep Survey      Responses   Facility patient discharged from?  [facility]   Is patient eligible?  Yes   Discharge diagnosis   COPD with acute exacerbation   Does the patient have one of the following disease processes/diagnoses(primary or secondary)?  COPD/Pneumonia   Does the patient have Home health ordered?  No   Is there a DME ordered?  No   Prep survey completed?  Yes          Violet Shi RN

## 2019-05-22 ENCOUNTER — READMISSION MANAGEMENT (OUTPATIENT)
Dept: CALL CENTER | Facility: HOSPITAL | Age: 74
End: 2019-05-22

## 2019-05-22 NOTE — OUTREACH NOTE
COPD/PN Week 1 Survey      Responses   Facility patient discharged from?  [place]   Does the patient have one of the following disease processes/diagnoses(primary or secondary)?  COPD/Pneumonia   Is there a successful TCM telephone encounter documented?  No   Was the primary reason for admission:  COPD exacerbation   Week 1 attempt successful?  Yes   Call end time  1214   Discharge diagnosis  COPD with acute exacerbation   Meds reviewed with patient/caregiver?  Yes   Is the patient having any side effects they believe may be caused by any medication additions or changes?  No   Does the patient have all medications ordered at discharge?  No   What is keeping the patient from filling the prescriptions?  Pre-authorization in progress, Prior authorization Issues   Nursing Interventions  No intervention needed, Nurse provided patient education   Is the patient taking all medications as directed (includes completed medication regime)?  No   What is preventing the patient from taking all medications as directed?  Doesn't understand importance, Medication regime   Nursing Interventions  Nurse provided patient education   Medication comments  She has been taking ABX once a day. Also didnt get nebs r/t no Dx on Rx. She prefers not to contact CM because she has a Pulm MD appt today in a few hours. Advised pt that ABX is BID.   Does the patient have a primary care provider?   Yes   Does the patient have an appointment with their PCP or pulmonologist within 7 days of discharge?  Yes   Has the patient kept scheduled appointments due by today?  Yes   Has home health visited the patient within 72 hours of discharge?  N/A   Psychosocial issues?  No   Psychosocial comments  Didnt get nebs r/t no Dx on Rx. She prefers not to contact CM because she has a Pulm MD appt today in a few hours.   Did the patient receive a copy of their discharge instructions?  Yes   Nursing interventions  Reviewed instructions with patient   What is the  patient's perception of their health status since discharge?  Improving   Nursing Interventions  Nurse provided patient education   Is the patient/caregiver able to teach back the hierarchy of who to call/visit for symptoms/problems? PCP, Specialist, Home health nurse, Urgent Care, ED, 911  Yes   Is the patient able to teach back COPD zones?  Yes   Nursing interventions  Education provided on various zones   Patient reports what zone on this call?  Green Zone   Green Zone  Usual activity and exercise level, Appetite is good, Sleeping well, Usual amount of phlegm/mucus without difficulty coughing up, Breathing without shortness of breath, Reports doing well   Green Zone interventions:  Take daily medications, Continue regular exercise/diet plan   Week 1 call completed?  Yes          Fortino Edwards RN

## 2019-05-31 ENCOUNTER — READMISSION MANAGEMENT (OUTPATIENT)
Dept: CALL CENTER | Facility: HOSPITAL | Age: 74
End: 2019-05-31

## 2019-05-31 NOTE — OUTREACH NOTE
COPD/PN Week 2 Survey      Responses   Facility patient discharged from?  [place]   Does the patient have one of the following disease processes/diagnoses(primary or secondary)?  COPD/Pneumonia   Was the primary reason for admission:  COPD exacerbation   Week 2 attempt successful?  No   Unsuccessful attempts  Attempt 1          Austin Marin RN

## 2019-06-04 ENCOUNTER — READMISSION MANAGEMENT (OUTPATIENT)
Dept: CALL CENTER | Facility: HOSPITAL | Age: 74
End: 2019-06-04

## 2019-06-04 NOTE — OUTREACH NOTE
COPD/PN Week 2 Survey      Responses   Facility patient discharged from?  [place]   Does the patient have one of the following disease processes/diagnoses(primary or secondary)?  COPD/Pneumonia   Was the primary reason for admission:  COPD exacerbation   Week 2 attempt successful?  Yes   Call start time  1610   Call end time  1619   Discharge diagnosis  COPD with acute exacerbation   Is patient permission given to speak with other caregiver?  No   Meds reviewed with patient/caregiver?  Yes   Is the patient taking all medications as directed (includes completed medication regime)?  Yes   Does the patient have a primary care provider?   Yes   Has the patient kept scheduled appointments due by today?  Yes   Has home health visited the patient within 72 hours of discharge?  N/A   DME comments  Patient using home nebulizer 2-3 times a day.    Psychosocial issues?  No   Did the patient receive a copy of their discharge instructions?  Yes   Nursing interventions  Reviewed instructions with patient   What is the patient's perception of their health status since discharge?  Improving   Nursing Interventions  Nurse provided patient education   Is the patient/caregiver able to teach back the hierarchy of who to call/visit for symptoms/problems? PCP, Specialist, Home health nurse, Urgent Care, ED, 911  Yes   Is the patient able to teach back COPD zones?  Yes   Nursing interventions  Education provided on various zones   Patient reports what zone on this call?  Green Zone   Green Zone  Reports doing well, Usual activity and exercise level, Usual amount of phlegm/mucus without difficulty coughing up   Green Zone interventions:  Take daily medications, Continue regular exercise/diet plan, Avoid indoor/outdoor triggers   Week 2 call completed?  Yes          Marya Diggs RN

## 2019-06-11 ENCOUNTER — READMISSION MANAGEMENT (OUTPATIENT)
Dept: CALL CENTER | Facility: HOSPITAL | Age: 74
End: 2019-06-11

## 2019-06-11 NOTE — OUTREACH NOTE
COPD/PN Week 3 Survey      Responses   Facility patient discharged from?  [place]   Does the patient have one of the following disease processes/diagnoses(primary or secondary)?  COPD/Pneumonia   Was the primary reason for admission:  COPD exacerbation   Week 3 attempt successful?  Yes   Call start time  1657   Call end time  1702   Discharge diagnosis  COPD with acute exacerbation   Is patient permission given to speak with other caregiver?  No   Meds reviewed with patient/caregiver?  Yes   Is the patient taking all medications as directed (includes completed medication regime)?  Yes   Does the patient have a primary care provider?   Yes   Has the patient kept scheduled appointments due by today?  Yes   Has home health visited the patient within 72 hours of discharge?  N/A   DME comments  Patient using home nebulizer.    Psychosocial issues?  No   Did the patient receive a copy of their discharge instructions?  Yes   Nursing interventions  Reviewed instructions with patient   What is the patient's perception of their health status since discharge?  Improving   Nursing Interventions  Nurse provided patient education   Is the patient/caregiver able to teach back the hierarchy of who to call/visit for symptoms/problems? PCP, Specialist, Home health nurse, Urgent Care, ED, 911  Yes   Is the patient able to teach back COPD zones?  Yes   Nursing interventions  Education provided on various zones   Patient reports what zone on this call?  Green Zone   Green Zone  Reports doing well, Usual amount of phlegm/mucus without difficulty coughing up, Usual activity and exercise level   Green Zone interventions:  Take daily medications, Avoid indoor/outdoor triggers   Week 3 call completed?  Yes          Marya Diggs RN

## 2019-06-18 ENCOUNTER — READMISSION MANAGEMENT (OUTPATIENT)
Dept: CALL CENTER | Facility: HOSPITAL | Age: 74
End: 2019-06-18

## 2019-06-18 NOTE — OUTREACH NOTE
COPD/PN Week 4 Survey      Responses   Facility patient discharged from?  [place]   Does the patient have one of the following disease processes/diagnoses(primary or secondary)?  COPD/Pneumonia   Was the primary reason for admission:  COPD exacerbation   Week 4 attempt successful?  No          Tawanna Muhammad RN

## 2019-11-22 ENCOUNTER — OFFICE VISIT (OUTPATIENT)
Dept: ORTHOPEDIC SURGERY | Facility: CLINIC | Age: 74
End: 2019-11-22

## 2019-11-22 VITALS — HEIGHT: 62 IN | BODY MASS INDEX: 18.58 KG/M2 | TEMPERATURE: 98.3 F | WEIGHT: 101 LBS

## 2019-11-22 DIAGNOSIS — Z96.651 S/P TOTAL KNEE ARTHROPLASTY, RIGHT: Primary | ICD-10-CM

## 2019-11-22 PROCEDURE — 99212 OFFICE O/P EST SF 10 MIN: CPT | Performed by: ORTHOPAEDIC SURGERY

## 2019-11-22 PROCEDURE — 73562 X-RAY EXAM OF KNEE 3: CPT | Performed by: ORTHOPAEDIC SURGERY

## 2019-11-22 NOTE — PROGRESS NOTES
"Елена Beach     : 1945     MRN: [id number]     DATE: 2019    DIAGNOSIS: Annual follow up right total knee      SUBJECTIVE:  Patient returns today for 1 year follow up of right total knee replacement. Patient reports doing well with no unusual complaints. Denies any limitations due to the knee.    OBJECTIVE:    Temp 98.3 °F (36.8 °C)   Ht 157.5 cm (62\")   Wt 45.8 kg (101 lb)   BMI 18.47 kg/m²     Family History   Problem Relation Age of Onset   • Heart failure Mother    • Hypertension Mother    • Cancer Mother         breast   • Cancer Father         prostate   • Malig Hyperthermia Neg Hx        Past Medical History:   Diagnosis Date   • Anemia    • Arthritis    • Asthma    • Back pain    • Bursitis    • COPD (chronic obstructive pulmonary disease) (CMS/HCC)    • Fibromyalgia    • Fibromyositis    • GERD (gastroesophageal reflux disease)    • Hiatal hernia    • History of pneumonia    • Hyperlipidemia    • Knee pain    • Osteoarthritis    • Osteoporosis    • PONV (postoperative nausea and vomiting)    • Scoliosis        Past Surgical History:   Procedure Laterality Date   • COLONOSCOPY     • ENDOSCOPY     • FOOT SURGERY Right    • HAND SURGERY     • SHOULDER ARTHROSCOPY  2014   • SHOULDER SURGERY Left    • TOE SURGERY Right    • TOOTH EXTRACTION     • TOTAL KNEE ARTHROPLASTY Right 2018    Procedure: TOTAL KNEE ARTHROPLASTY;  Surgeon: Gerry Estevez MD;  Location: [facility];  Service: Orthopedics   • TUBAL ABDOMINAL LIGATION     • WRIST SURGERY         Social History     Socioeconomic History   • Marital status: Single     Spouse name: Not on file   • Number of children: 1   • Years of education: Not on file   • Highest education level: Not on file   Occupational History   • Occupation: [employer]     Employer: RETIRED   Tobacco Use   • Smoking status: Former Smoker     Packs/day: 1.00     Years: 40.00     Pack years: 40.00     Types: Cigarettes     Last " attempt to quit: 11/1/2016     Years since quitting: 3.0   • Smokeless tobacco: Never Used   Substance and Sexual Activity   • Alcohol use: Yes     Alcohol/week: 0.6 oz     Types: 1 Shots of liquor per week     Comment: occ   • Drug use: No   • Sexual activity: Not Currently     Partners: Male     Birth control/protection: Other       Review of Systems:   A 14 point review of systems is reviewed with the patient.  Pertinent positives are listed above.  All others negative.    Exam:  The incision is well healed. Range of motion is measured at 0 to 120. The calf is soft and nontender with a negative Homans sign. Alignment is neutral. Good quad strength. There is no evidence of varus/valgus or flexion instability. No effusion. Intact to light touch with palpable distal pulses.     DIAGNOSTIC STUDIES    Xrays: 3 views of the right knee (AP, lateral, and sunrise) were ordered and reviewed for evaluation of recent knee replacement. They demonstrate a well positioned, well aligned knee replacement without complicating factors noted. In comparison with previous films there has been no change.    ASSESSMENT: Annual follow up right knee replacement.    PLAN:  Antibiotic prophylaxis recommendations discussed.  Overall she seems to be doing very well.    Follow up in 1 year    Gerry Estevez MD

## 2019-12-16 ENCOUNTER — TRANSCRIBE ORDERS (OUTPATIENT)
Dept: ADMINISTRATIVE | Facility: HOSPITAL | Age: 74
End: 2019-12-16

## 2019-12-16 DIAGNOSIS — Z12.31 VISIT FOR SCREENING MAMMOGRAM: Primary | ICD-10-CM

## 2020-01-27 ENCOUNTER — OFFICE VISIT (OUTPATIENT)
Dept: FAMILY MEDICINE CLINIC | Facility: CLINIC | Age: 75
End: 2020-01-27

## 2020-01-27 VITALS
BODY MASS INDEX: 18.22 KG/M2 | TEMPERATURE: 97.5 F | WEIGHT: 99 LBS | HEIGHT: 62 IN | SYSTOLIC BLOOD PRESSURE: 104 MMHG | DIASTOLIC BLOOD PRESSURE: 62 MMHG | HEART RATE: 68 BPM | OXYGEN SATURATION: 98 %

## 2020-01-27 DIAGNOSIS — Z13.1 SCREENING FOR DIABETES MELLITUS: ICD-10-CM

## 2020-01-27 DIAGNOSIS — Z83.49 FAMILY HISTORY OF HYPOTHYROIDISM: ICD-10-CM

## 2020-01-27 DIAGNOSIS — E78.5 HYPERLIPIDEMIA, UNSPECIFIED HYPERLIPIDEMIA TYPE: ICD-10-CM

## 2020-01-27 DIAGNOSIS — Z00.00 MEDICARE ANNUAL WELLNESS VISIT, SUBSEQUENT: Primary | ICD-10-CM

## 2020-01-27 DIAGNOSIS — Z13.0 SCREENING FOR IRON DEFICIENCY ANEMIA: ICD-10-CM

## 2020-01-27 DIAGNOSIS — M79.7 FIBROMYALGIA: Primary | ICD-10-CM

## 2020-01-27 PROBLEM — M79.661 RIGHT CALF PAIN: Status: RESOLVED | Noted: 2019-01-22 | Resolved: 2020-01-27

## 2020-01-27 PROBLEM — Z98.890 STATUS POST SURGERY: Status: RESOLVED | Noted: 2019-01-22 | Resolved: 2020-01-27

## 2020-01-27 PROCEDURE — G0439 PPPS, SUBSEQ VISIT: HCPCS | Performed by: NURSE PRACTITIONER

## 2020-01-27 RX ORDER — AZITHROMYCIN 250 MG/1
TABLET, FILM COATED ORAL
COMMUNITY
Start: 2020-01-22 | End: 2021-01-28

## 2020-01-27 RX ORDER — DULOXETIN HYDROCHLORIDE 30 MG/1
30 CAPSULE, DELAYED RELEASE ORAL DAILY
Qty: 90 CAPSULE | Refills: 0 | Status: SHIPPED | OUTPATIENT
Start: 2020-01-27 | End: 2020-06-25

## 2020-01-27 NOTE — PROGRESS NOTES
The ABCs of the Annual Wellness Visit  Subsequent Medicare Wellness Visit    Chief Complaint   Patient presents with   • Medicare Wellness-subsequent     Patient is here for her AWV subsquent she is fasting to have her labs drawn and needing medications refilled        Subjective   History of Present Illness:  Елена Beach is a 74 y.o. female who presents for a Subsequent Medicare Wellness Visit.    HEALTH RISK ASSESSMENT    Recent Hospitalizations:  No hospitalization(s) within the last year.    Current Medical Providers:  Patient Care Team:  Caleb Velez APRN as PCP - General (Family Medicine)  Izabel Villa MD as Consulting Physician (Pulmonary Disease)    Smoking Status:  Social History     Tobacco Use   Smoking Status Former Smoker   • Packs/day: 1.00   • Years: 40.00   • Pack years: 40.00   • Types: Cigarettes   • Last attempt to quit: 11/1/2016   • Years since quitting: 3.2   Smokeless Tobacco Never Used       Alcohol Consumption:  Social History     Substance and Sexual Activity   Alcohol Use Yes   • Alcohol/week: 1.0 standard drinks   • Types: 1 Shots of liquor per week    Comment: occ       Depression Screen:   PHQ-2/PHQ-9 Depression Screening 1/27/2020   Little interest or pleasure in doing things 0   Feeling down, depressed, or hopeless 0   Total Score 0       Fall Risk Screen:  STEADI Fall Risk Assessment has not been completed.    Health Habits and Functional and Cognitive Screening:  Functional & Cognitive Status 1/27/2020   Do you have difficulty preparing food and eating? No   Do you have difficulty bathing yourself, getting dressed or grooming yourself? No   Do you have difficulty using the toilet? No   Do you have difficulty moving around from place to place? No   Do you have trouble with steps or getting out of a bed or a chair? No   Current Diet Frequent Junk Food   Dental Exam Up to date   Eye Exam Up to date   Exercise (times per week) 3 times per week   Current Exercise Activities  Include Walking   Do you need help using the phone?  No   Are you deaf or do you have serious difficulty hearing?  No   Do you need help with transportation? No   Do you need help shopping? No   Do you need help preparing meals?  No   Do you need help with housework?  Yes   Do you need help with laundry? No   Do you need help taking your medications? No   Do you need help managing money? No   Do you ever drive or ride in a car without wearing a seat belt? No         Does the patient have evidence of cognitive impairment? No    Asprin use counseling:Taking ASA appropriately as indicated    Age-appropriate Screening Schedule:  Refer to the list below for future screening recommendations based on patient's age, sex and/or medical conditions. Orders for these recommended tests are listed in the plan section. The patient has been provided with a written plan.    Health Maintenance   Topic Date Due   • ZOSTER VACCINE (1 of 2) 03/22/1996   • LIPID PANEL  02/20/2019   • DXA SCAN  03/02/2019   • INFLUENZA VACCINE  08/01/2019   • MAMMOGRAM  01/29/2021   • COLONOSCOPY  02/01/2026   • TDAP/TD VACCINES (2 - Td) 10/03/2026          The following portions of the patient's history were reviewed and updated as appropriate: allergies, current medications, past family history, past medical history, past social history, past surgical history and problem list.    Outpatient Medications Prior to Visit   Medication Sig Dispense Refill   • albuterol 108 (90 Base) MCG/ACT inhaler Inhale 2 puffs Every 4 (Four) Hours As Needed for Wheezing.     • arformoterol (BROVANA) 15 MCG/2ML nebulizer solution Take 2 mL by nebulization 2 (Two) Times a Day. 120 mL 0   • atorvastatin (LIPITOR) 10 MG tablet Take 1 tablet by mouth Daily. (Patient taking differently: Take 10 mg by mouth Every Night.) 90 tablet 3   • azithromycin (ZITHROMAX) 250 MG tablet TAKE 1 TABLET BY MOUTH 5 TIMES A WEEK (MONDAY FRIDAY)     • budesonide-formoterol (SYMBICORT) 160-4.5  MCG/ACT inhaler Inhale 2 puffs 2 (Two) Times a Day.     • Calcium Citrate-Vitamin D (CALCIUM CITRATE + D PO) Take 1 tablet by mouth Daily.     • DULoxetine (CYMBALTA) 60 MG capsule Take 1 capsule by mouth Daily. 90 capsule 3   • Ferrous Gluconate (IRON 27 PO) Take 1 tablet by mouth Every Other Day.     • fluticasone (FLONASE) 50 MCG/ACT nasal spray 2 sprays into the nostril(s) as directed by provider As Needed.     • GuaiFENesin (MUCINEX PO) Take 1,200 mg by mouth 2 (Two) Times a Day.     • ipratropium-albuterol (DUO-NEB) 0.5-2.5 mg/3 ml nebulizer Take 3 mL by nebulization 4 (Four) Times a Day. 360 mL 3   • montelukast (SINGULAIR) 10 MG tablet Take 10 mg by mouth Every Night.     • Multiple Vitamins-Minerals (MULTIVITAMIN ADULT PO) Take 1 tablet by mouth Daily.     • NON FORMULARY 1 capsule 2 (Two) Times a Day. instaflex     • omeprazole (priLOSEC) 40 MG capsule Take 1 capsule by mouth Every Morning. 90 capsule 3   • tiotropium (SPIRIVA) 18 MCG per inhalation capsule Place 1 capsule into inhaler and inhale Daily.     • aspirin 325 MG EC tablet Take 1 tablet by mouth Daily. (Patient taking differently: Take 325 mg by mouth Daily. 5/20/19 Patient states she does NOT take Aspirin)     • methylPREDNISolone (MEDROL, SONIA,) 4 MG tablet Take as directed on package instructions. 1 each 0   • sodium chloride (OCEAN) 0.65 % nasal spray 1 spray into the nostril(s) as directed by provider As Needed for Congestion.       No facility-administered medications prior to visit.        Patient Active Problem List   Diagnosis   • Fibromyalgia   • Arthritis   • Hiatal hernia   • Anxiety and depression   • Asthma   • Hyperlipidemia   • Iron deficiency anemia   • Chronic obstructive pulmonary disease (CMS/HCC)   • Chronic fatigue   • Snoring   • Dependence on nicotine from cigarettes   • Insomnia   • Osteopenia   • Screening for diabetes mellitus   • Shortness of breath   • Family history of hypothyroidism       Advanced Care  "Planning:  Patient has an advance directive - a copy has not been provided. Have asked the patient to send this to us to add to record    Review of Systems   Cardiovascular: Negative for chest pain and palpitations.       Compared to one year ago, the patient feels her physical health is the same.  Compared to one year ago, the patient feels her mental health is the same.    Reviewed chart for potential of high risk medication in the elderly: yes  Reviewed chart for potential of harmful drug interactions in the elderly:yes    Objective         Vitals:    01/27/20 1014   BP: 104/62   BP Location: Right arm   Patient Position: Sitting   Pulse: 68   Temp: 97.5 °F (36.4 °C)   SpO2: 98%   Weight: 44.9 kg (99 lb)   Height: 157.5 cm (62.01\")   PainSc: 0-No pain       Body mass index is 18.1 kg/m².  Discussed the patient's BMI with her. The BMI is in the acceptable range.    Physical Exam          Assessment/Plan   Medicare Risks and Personalized Health Plan  CMS Preventative Services Quick Reference  Cardiovascular risk  Fall Risk    The above risks/problems have been discussed with the patient.  Pertinent information has been shared with the patient in the After Visit Summary.  Follow up plans and orders are seen below in the Assessment/Plan Section.    Diagnoses and all orders for this visit:    1. Medicare annual wellness visit, subsequent (Primary)    2. Screening for iron deficiency anemia  -     CBC & Differential    3. Screening for diabetes mellitus  -     Comprehensive Metabolic Panel    4. Hyperlipidemia, unspecified hyperlipidemia type  -     Comprehensive Metabolic Panel  -     Lipid Panel With LDL / HDL Ratio    5. Family history of hypothyroidism  -     TSH      Follow Up:  Return if symptoms worsen or fail to improve.     An After Visit Summary and PPPS were given to the patient.             "

## 2020-01-28 LAB
ALBUMIN SERPL-MCNC: 4.2 G/DL (ref 3.5–5.2)
ALBUMIN/GLOB SERPL: 1.8 G/DL
ALP SERPL-CCNC: 154 U/L (ref 39–117)
ALT SERPL-CCNC: 18 U/L (ref 1–33)
AST SERPL-CCNC: 29 U/L (ref 1–32)
BASOPHILS # BLD AUTO: 0.07 10*3/MM3 (ref 0–0.2)
BASOPHILS NFR BLD AUTO: 1.1 % (ref 0–1.5)
BILIRUB SERPL-MCNC: 0.4 MG/DL (ref 0.2–1.2)
BUN SERPL-MCNC: 8 MG/DL (ref 8–23)
BUN/CREAT SERPL: 11.9 (ref 7–25)
CALCIUM SERPL-MCNC: 9.4 MG/DL (ref 8.6–10.5)
CHLORIDE SERPL-SCNC: 98 MMOL/L (ref 98–107)
CHOLEST SERPL-MCNC: 170 MG/DL (ref 0–200)
CO2 SERPL-SCNC: 28.6 MMOL/L (ref 22–29)
CREAT SERPL-MCNC: 0.67 MG/DL (ref 0.57–1)
EOSINOPHIL # BLD AUTO: 1.14 10*3/MM3 (ref 0–0.4)
EOSINOPHIL NFR BLD AUTO: 18.5 % (ref 0.3–6.2)
ERYTHROCYTE [DISTWIDTH] IN BLOOD BY AUTOMATED COUNT: 12.2 % (ref 12.3–15.4)
GLOBULIN SER CALC-MCNC: 2.4 GM/DL
GLUCOSE SERPL-MCNC: 104 MG/DL (ref 65–99)
HCT VFR BLD AUTO: 41.2 % (ref 34–46.6)
HDLC SERPL-MCNC: 80 MG/DL (ref 40–60)
HGB BLD-MCNC: 13.9 G/DL (ref 12–15.9)
IMM GRANULOCYTES # BLD AUTO: 0.01 10*3/MM3 (ref 0–0.05)
IMM GRANULOCYTES NFR BLD AUTO: 0.2 % (ref 0–0.5)
LDLC SERPL CALC-MCNC: 72 MG/DL (ref 0–100)
LDLC/HDLC SERPL: 0.9 {RATIO}
LYMPHOCYTES # BLD AUTO: 1.71 10*3/MM3 (ref 0.7–3.1)
LYMPHOCYTES NFR BLD AUTO: 27.8 % (ref 19.6–45.3)
MCH RBC QN AUTO: 30.4 PG (ref 26.6–33)
MCHC RBC AUTO-ENTMCNC: 33.7 G/DL (ref 31.5–35.7)
MCV RBC AUTO: 90.2 FL (ref 79–97)
MONOCYTES # BLD AUTO: 0.48 10*3/MM3 (ref 0.1–0.9)
MONOCYTES NFR BLD AUTO: 7.8 % (ref 5–12)
NEUTROPHILS # BLD AUTO: 2.74 10*3/MM3 (ref 1.7–7)
NEUTROPHILS NFR BLD AUTO: 44.6 % (ref 42.7–76)
NRBC BLD AUTO-RTO: 0 /100 WBC (ref 0–0.2)
PLATELET # BLD AUTO: 366 10*3/MM3 (ref 140–450)
POTASSIUM SERPL-SCNC: 4.5 MMOL/L (ref 3.5–5.2)
PROT SERPL-MCNC: 6.6 G/DL (ref 6–8.5)
RBC # BLD AUTO: 4.57 10*6/MM3 (ref 3.77–5.28)
SODIUM SERPL-SCNC: 140 MMOL/L (ref 136–145)
TRIGL SERPL-MCNC: 89 MG/DL (ref 0–150)
TSH SERPL DL<=0.005 MIU/L-ACNC: 2.55 UIU/ML (ref 0.27–4.2)
VLDLC SERPL CALC-MCNC: 17.8 MG/DL
WBC # BLD AUTO: 6.15 10*3/MM3 (ref 3.4–10.8)

## 2020-01-31 ENCOUNTER — HOSPITAL ENCOUNTER (OUTPATIENT)
Dept: MAMMOGRAPHY | Facility: HOSPITAL | Age: 75
Discharge: HOME | End: 2020-01-31
Admitting: NURSE PRACTITIONER

## 2020-01-31 DIAGNOSIS — Z12.31 VISIT FOR SCREENING MAMMOGRAM: ICD-10-CM

## 2020-01-31 PROCEDURE — 77067 SCR MAMMO BI INCL CAD: CPT

## 2020-01-31 PROCEDURE — 77063 BREAST TOMOSYNTHESIS BI: CPT

## 2020-02-14 DIAGNOSIS — E78.5 HYPERLIPIDEMIA, UNSPECIFIED HYPERLIPIDEMIA TYPE: ICD-10-CM

## 2020-02-17 ENCOUNTER — TELEPHONE (OUTPATIENT)
Dept: FAMILY MEDICINE CLINIC | Facility: CLINIC | Age: 75
End: 2020-02-17

## 2020-02-17 DIAGNOSIS — M85.80 OSTEOPENIA, UNSPECIFIED LOCATION: ICD-10-CM

## 2020-02-17 DIAGNOSIS — Z78.0 POST-MENOPAUSAL: Primary | ICD-10-CM

## 2020-02-17 RX ORDER — ATORVASTATIN CALCIUM 10 MG/1
10 TABLET, FILM COATED ORAL NIGHTLY
Qty: 90 TABLET | Refills: 0 | Status: SHIPPED | OUTPATIENT
Start: 2020-02-17 | End: 2020-05-13

## 2020-02-17 NOTE — TELEPHONE ENCOUNTER
Patient is due for her yearly Bone Density. Can you please enter an order for this and we will get patient scheduled.

## 2020-03-18 ENCOUNTER — APPOINTMENT (OUTPATIENT)
Dept: BONE DENSITY | Facility: HOSPITAL | Age: 75
End: 2020-03-18

## 2020-04-07 ENCOUNTER — OFFICE VISIT (OUTPATIENT)
Dept: FAMILY MEDICINE CLINIC | Facility: CLINIC | Age: 75
End: 2020-04-07

## 2020-04-07 DIAGNOSIS — R19.7 DIARRHEA, UNSPECIFIED TYPE: Primary | ICD-10-CM

## 2020-04-07 PROCEDURE — 99442 PR PHYS/QHP TELEPHONE EVALUATION 11-20 MIN: CPT | Performed by: NURSE PRACTITIONER

## 2020-04-07 NOTE — PROGRESS NOTES
You have chosen to receive care through a telephone visit today. Do you consent to use a telephone visit for your medical care today? Yes    Pt concerned with diarrhea that is daily and started 2 weeks ago. No traveling. No other s/s no fever  She is using Kaopectate that helps for a day.  Diarrhea multiple times a day   She is eating normally. No abdominal pain.  Has not tried any probiotics or yogurt  She is on a azithromycin from her pulmonologist which she has been on for at least 6 months and she is not had any issues with that medication.  Considering that is a medication I can give her to get her diarrhea to stop I think really just try probiotics at this time.  Suggested Florastor 2 in the morning and 2 at night.   Call back if does not help    Call lasted 15 mins

## 2020-04-15 ENCOUNTER — TELEPHONE (OUTPATIENT)
Dept: FAMILY MEDICINE CLINIC | Facility: CLINIC | Age: 75
End: 2020-04-15

## 2020-04-15 NOTE — TELEPHONE ENCOUNTER
Spoke with pt, she is having diarrhea again she had made telephone call with Caleb on April 7th in regard this, she felt better for some days then yesterday she started diarrhea again she is having chills, feeling tired, needs an advise?

## 2020-04-15 NOTE — TELEPHONE ENCOUNTER
PT WAS GIVEN A MEDICATION FOR HER DIARRHEA AND IT WORKED BUT NOW SHE IS OUT AND THE DIARRHEA IS BACK. WONDERING WHAT SHOULD HAPPEN NEXT?    PT CALL BACK [phone]

## 2020-04-16 NOTE — TELEPHONE ENCOUNTER
Patient called back and said she did what aryan said to take 2 in the morning and 2 in the afternoon and then one daily until bottle was gone. She said there was only 20 in there. She said aryan told her to take all those and then call and let her know. So she hasn't been taking any since she finished that bottle

## 2020-04-16 NOTE — TELEPHONE ENCOUNTER
Left message for patient to call the office back to let aryan know how many times a days she is taking florastor

## 2020-04-21 ENCOUNTER — APPOINTMENT (OUTPATIENT)
Dept: BONE DENSITY | Facility: HOSPITAL | Age: 75
End: 2020-04-21

## 2020-05-13 DIAGNOSIS — E78.5 HYPERLIPIDEMIA, UNSPECIFIED HYPERLIPIDEMIA TYPE: ICD-10-CM

## 2020-05-13 RX ORDER — ATORVASTATIN CALCIUM 10 MG/1
TABLET, FILM COATED ORAL
Qty: 90 TABLET | Refills: 0 | Status: SHIPPED | OUTPATIENT
Start: 2020-05-13 | End: 2020-08-11

## 2020-05-19 RX ORDER — OMEPRAZOLE 40 MG/1
CAPSULE, DELAYED RELEASE ORAL
Qty: 90 CAPSULE | Refills: 0 | OUTPATIENT
Start: 2020-05-19

## 2020-05-22 ENCOUNTER — OFFICE VISIT (OUTPATIENT)
Dept: GASTROENTEROLOGY | Facility: CLINIC | Age: 75
End: 2020-05-22

## 2020-05-22 VITALS — WEIGHT: 100 LBS | BODY MASS INDEX: 18.4 KG/M2 | TEMPERATURE: 98 F | HEIGHT: 62 IN

## 2020-05-22 DIAGNOSIS — K21.9 GASTROESOPHAGEAL REFLUX DISEASE, ESOPHAGITIS PRESENCE NOT SPECIFIED: ICD-10-CM

## 2020-05-22 DIAGNOSIS — R15.2 FECAL URGENCY: ICD-10-CM

## 2020-05-22 DIAGNOSIS — R19.4 CHANGE IN BOWEL HABITS: Primary | ICD-10-CM

## 2020-05-22 PROCEDURE — 99214 OFFICE O/P EST MOD 30 MIN: CPT | Performed by: NURSE PRACTITIONER

## 2020-05-22 RX ORDER — OMEPRAZOLE 40 MG/1
40 CAPSULE, DELAYED RELEASE ORAL EVERY MORNING
Qty: 90 CAPSULE | Refills: 3 | Status: SHIPPED | OUTPATIENT
Start: 2020-05-22 | End: 2021-04-30 | Stop reason: SDUPTHER

## 2020-05-22 RX ORDER — OMEPRAZOLE 40 MG/1
40 CAPSULE, DELAYED RELEASE ORAL EVERY MORNING
Qty: 90 CAPSULE | Refills: 3 | Status: SHIPPED | OUTPATIENT
Start: 2020-05-22 | End: 2020-05-22 | Stop reason: SDUPTHER

## 2020-05-22 NOTE — PROGRESS NOTES
Chief Complaint   Patient presents with   • Follow-up       Елена Beach is a  75 y.o. female here for a follow up visit for GERD.    HPI  75-year-old female presents today for follow-up visit for GERD.  She is a patient of Dr. Marshall.  She was last seen in the office on 5/8/2019.  She has a history of GERD admits she does really well on omeprazole 40 mg once daily.  She denies any breakthrough reflux at this time.  However she is been having issues lately with her bowel habits.  She normally goes really well every day but since starting azithromycin this past January for her pulmonary issues she has been noticing lots of changes in her bowel habits.  She is been experiencing lots of gas and bloating with some loose stools and trouble completely emptying.  She is also had some fecal urgency issues as well.  She did start taking Florastor on the advice of her primary care provider and this helped her greatly however it is very expensive.  So she is only taking it a couple of times.  She has since tried taking Culturelle OTC and admits this is helped to but not as well as the Florastor.  She still continues to have problems completely emptying and with her firmness.  She does not take any fiber supplementation.  She denies any dysphagia, reflux, abdominal pain, nausea and vomiting, constipation, rectal bleeding or melena.  She was appetite is good and her weight is stable.  She is hoping to get off of the antibiotic soon. Last EGD and colonoscopy were done several years ago and the patient's not really sure what year that was.  Past Medical History:   Diagnosis Date   • Anemia    • Arthritis    • Asthma    • Back pain    • Bursitis 1995   • COPD (chronic obstructive pulmonary disease) (CMS/[facility])    • Fibromyalgia    • Fibromyositis 1995   • GERD (gastroesophageal reflux disease)    • Hiatal hernia    • History of pneumonia    • Hyperlipidemia    • Knee pain    • Osteoarthritis 2008   • Osteoporosis    • PONV  "(postoperative nausea and vomiting)    • Scoliosis 1995       Past Surgical History:   Procedure Laterality Date   • COLONOSCOPY     • ENDOSCOPY     • FOOT SURGERY Right 2006   • HAND SURGERY  2004   • SHOULDER ARTHROSCOPY  2014   • SHOULDER SURGERY Left    • TOE SURGERY Right    • TOOTH EXTRACTION     • TOTAL KNEE ARTHROPLASTY Right 12/27/2018    Procedure: TOTAL KNEE ARTHROPLASTY;  Surgeon: Gerry Estevez MD;  Location: [facility];  Service: Orthopedics   • TUBAL ABDOMINAL LIGATION     • WRIST SURGERY         Scheduled Meds:    Continuous Infusions:  No current facility-administered medications for this visit.     PRN Meds:.    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   • Codeine Other (See Comments)     Keeps awake   • Ibuprofen Other (See Comments)     Can't take due to hiatal hernia   • Keflex [Cephalexin] Other (See Comments)     Makes feel \"goofy\"   • Pseudoephedrine Other (See Comments)     Heart palpitations       Social History     Socioeconomic History   • Marital status: Single     Spouse name: Not on file   • Number of children: 1   • Years of education: Not on file   • Highest education level: Not on file   Occupational History   • Occupation: "[employer]     Employer: RETIRED   Tobacco Use   • Smoking status: Former Smoker     Packs/day: 1.00     Years: 40.00     Pack years: 40.00     Types: Cigarettes     Last attempt to quit: 11/1/2016     Years since quitting: 3.5   • Smokeless tobacco: Never Used   Substance and Sexual Activity   • Alcohol use: Yes     Alcohol/week: 1.0 standard drinks     Types: 1 Shots of liquor per week     Comment: occ   • Drug use: No   • Sexual activity: Not Currently     Partners: Male     Birth control/protection: Other       Family History   Problem Relation Age of Onset   • Heart failure Mother    • Hypertension " Mother    • Cancer Mother         breast   • Cancer Father         prostate   • Malig Hyperthermia Neg Hx        Review of Systems   Constitutional: Negative for appetite change, chills, diaphoresis, fatigue, fever and unexpected weight change.   HENT: Negative for nosebleeds, postnasal drip, sore throat, trouble swallowing and voice change.    Respiratory: Negative for cough, choking, chest tightness, shortness of breath and wheezing.    Cardiovascular: Negative for chest pain, palpitations and leg swelling.   Gastrointestinal: Negative for abdominal distention, abdominal pain, anal bleeding, blood in stool, constipation, diarrhea, nausea, rectal pain and vomiting.   Endocrine: Negative for polydipsia, polyphagia and polyuria.   Musculoskeletal: Negative for gait problem.   Skin: Negative for rash and wound.   Allergic/Immunologic: Negative for food allergies.   Neurological: Negative for dizziness, speech difficulty and light-headedness.   Psychiatric/Behavioral: Negative for confusion, self-injury, sleep disturbance and suicidal ideas.       Vitals:    05/22/20 1115   Temp: 98 °F (36.7 °C)       Physical Exam   Constitutional: She is oriented to person, place, and time. She appears well-developed and well-nourished. She does not appear ill. No distress.   HENT:   Head: Normocephalic.   Eyes: Pupils are equal, round, and reactive to light.   Cardiovascular: Normal rate, regular rhythm and normal heart sounds.   Pulmonary/Chest: Effort normal and breath sounds normal.   Abdominal: Soft. Bowel sounds are normal. She exhibits no distension and no mass. There is no hepatosplenomegaly. There is no tenderness. There is no rebound and no guarding. No hernia.   Musculoskeletal: Normal range of motion.   Neurological: She is alert and oriented to person, place, and time.   Skin: Skin is warm and dry.   Psychiatric: She has a normal mood and affect. Her speech is normal and behavior is normal. Judgment normal.       No  radiology results for the last 7 days     Assessment and plan    1. Change in bowel habits    2. Fecal urgency    3. Gastroesophageal reflux disease, esophagitis presence not specified    Patient overall seems to be doing really well.  Seems like her biggest issues have been since she has been on the azithromycin for her pulmonary issues.  This has caused some changes in her bowel habits and made her bowels a lot looser and more frequent.  She also had more urgency issues.  I really think she should continue the Culturelle probiotics and start a daily fiber supplement.  I will give her a handout on this today.  GERD seems well controlled on omeprazole 40 mg once daily.  I will refill for 1 year.  Continue GERD precautions.  Patient to call the office next week with an update patient to follow-up with me in 1 month.

## 2020-05-26 ENCOUNTER — HOSPITAL ENCOUNTER (OUTPATIENT)
Dept: BONE DENSITY | Facility: HOSPITAL | Age: 75
Discharge: HOME | End: 2020-05-26
Admitting: NURSE PRACTITIONER

## 2020-05-26 PROCEDURE — 77080 DXA BONE DENSITY AXIAL: CPT

## 2020-06-25 ENCOUNTER — OFFICE VISIT (OUTPATIENT)
Dept: GASTROENTEROLOGY | Facility: CLINIC | Age: 75
End: 2020-06-25

## 2020-06-25 VITALS — BODY MASS INDEX: 18.33 KG/M2 | HEIGHT: 62 IN | TEMPERATURE: 97.8 F | WEIGHT: 99.6 LBS

## 2020-06-25 DIAGNOSIS — R19.4 CHANGE IN BOWEL HABITS: Primary | ICD-10-CM

## 2020-06-25 DIAGNOSIS — R15.2 INCONTINENCE OF FECES WITH FECAL URGENCY: ICD-10-CM

## 2020-06-25 DIAGNOSIS — R15.9 INCONTINENCE OF FECES WITH FECAL URGENCY: ICD-10-CM

## 2020-06-25 DIAGNOSIS — K21.9 GASTROESOPHAGEAL REFLUX DISEASE, ESOPHAGITIS PRESENCE NOT SPECIFIED: ICD-10-CM

## 2020-06-25 DIAGNOSIS — K44.9 HIATAL HERNIA: ICD-10-CM

## 2020-06-25 PROCEDURE — 99214 OFFICE O/P EST MOD 30 MIN: CPT | Performed by: NURSE PRACTITIONER

## 2020-06-25 RX ORDER — CETIRIZINE HYDROCHLORIDE 10 MG/1
10 TABLET ORAL DAILY
COMMUNITY
End: 2022-01-31

## 2020-06-25 RX ORDER — SODIUM PHOSPHATE,MONO-DIBASIC 19G-7G/118
ENEMA (ML) RECTAL DAILY
COMMUNITY

## 2020-06-25 RX ORDER — LACTOBACILLUS RHAMNOSUS GG 10B CELL
CAPSULE ORAL DAILY
COMMUNITY

## 2020-06-25 RX ORDER — AZELASTINE 1 MG/ML
2 SPRAY, METERED NASAL
COMMUNITY
Start: 2020-02-17 | End: 2021-10-29 | Stop reason: SDUPTHER

## 2020-06-25 NOTE — PROGRESS NOTES
Chief Complaint   Patient presents with   • change in bowel habits       Елена Beach is a  75 y.o. female here for a follow up visit for change in bowel habits.    HPI  75-year-old female presents today for follow-up visit for change in bowel habits and GERD.  She is a patient of Dr. Marshall.  She was last seen in the office on 5/22/2020.  She has a history of loose stools with fecal urgency and admits she has been so much better since taking daily fiber as well as Culturelle probiotics.  She does not feel 100% better but she is noticeably better.  She does have a history of GERD and admits she does really well on omeprazole 40 mg once daily.  She denies any breakthrough reflux at this time.  She denies any dysphagia, reflux, abdominal pain, nausea and vomiting, constipation, rectal bleeding or melena.  She is appetite is good and her weight is stable.  Past Medical History:   Diagnosis Date   • Anemia    • Arthritis    • Asthma    • Back pain    • Bursitis 1995   • COPD (chronic obstructive pulmonary disease) (CMS/[facility])    • Fibromyalgia    • Fibromyositis 1995   • GERD (gastroesophageal reflux disease)    • Hiatal hernia    • History of pneumonia    • Hyperlipidemia    • Knee pain    • Osteoarthritis 2008   • Osteoporosis    • PONV (postoperative nausea and vomiting)    • Scoliosis 1995       Past Surgical History:   Procedure Laterality Date   • COLONOSCOPY     • ENDOSCOPY     • FOOT SURGERY Right 2006   • HAND SURGERY  2004   • SHOULDER ARTHROSCOPY  2014   • SHOULDER SURGERY Left    • TOE SURGERY Right    • TOOTH EXTRACTION     • TOTAL KNEE ARTHROPLASTY Right 12/27/2018    Procedure: TOTAL KNEE ARTHROPLASTY;  Surgeon: Gerry Estevez MD;  Location: [facility];  Service: Orthopedics   • TUBAL ABDOMINAL LIGATION     • WRIST SURGERY         Scheduled Meds:    Continuous Infusions:  No current facility-administered medications for this visit.     PRN Meds:.    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   • Codeine Other (See Comments)     Keeps awake   • Ibuprofen Other (See Comments)     Can't take due to hiatal hernia   • Keflex [Cephalexin] Other (See Comments)     Makes feel \"goofy\"   • Pseudoephedrine Other (See Comments)     Heart palpitations       Social History     Socioeconomic History   • Marital status: Single     Spouse name: Not on file   • Number of children: 1   • Years of education: Not on file   • Highest education level: Not on file   Occupational History   • Occupation: [employer]     Employer: RETIRED   Tobacco Use   • Smoking status: Former Smoker     Packs/day: 1.00     Years: 40.00     Pack years: 40.00     Types: Cigarettes     Last attempt to quit: 11/1/2016     Years since quitting: 3.6   • Smokeless tobacco: Never Used   Substance and Sexual Activity   • Alcohol use: Yes     Alcohol/week: 1.0 standard drinks     Types: 1 Shots of liquor per week     Comment: occ   • Drug use: No   • Sexual activity: Not Currently     Partners: Male     Birth control/protection: Other       Family History   Problem Relation Age of Onset   • Heart failure Mother    • Hypertension Mother    • Cancer Mother         breast   • Cancer Father         prostate   • Malig Hyperthermia Neg Hx        Review of Systems   Constitutional: Negative for appetite change, chills, diaphoresis, fatigue, fever and unexpected weight change.   HENT: Negative for nosebleeds, postnasal drip, sore throat, trouble swallowing and voice change.    Respiratory: Negative for cough, choking, chest tightness, shortness of breath and wheezing.    Cardiovascular: Negative for chest pain, palpitations and leg swelling.   Gastrointestinal: Negative for abdominal distention, abdominal pain, anal bleeding, blood in stool, constipation, diarrhea, nausea, rectal pain and vomiting. "   Endocrine: Negative for polydipsia, polyphagia and polyuria.   Musculoskeletal: Negative for gait problem.   Skin: Negative for rash and wound.   Allergic/Immunologic: Negative for food allergies.   Neurological: Negative for dizziness, speech difficulty and light-headedness.   Psychiatric/Behavioral: Negative for confusion, self-injury, sleep disturbance and suicidal ideas.       Vitals:    06/25/20 1055   Temp: 97.8 °F (36.6 °C)       Physical Exam   Constitutional: She is oriented to person, place, and time. She appears well-developed and well-nourished. She does not appear ill. No distress.   HENT:   Head: Normocephalic.   Eyes: Pupils are equal, round, and reactive to light.   Cardiovascular: Normal rate, regular rhythm and normal heart sounds.   Pulmonary/Chest: Effort normal and breath sounds normal.   Abdominal: Soft. Bowel sounds are normal. She exhibits no distension and no mass. There is no hepatosplenomegaly. There is no tenderness. There is no rebound and no guarding. No hernia.   Musculoskeletal: Normal range of motion.   Neurological: She is alert and oriented to person, place, and time.   Skin: Skin is warm and dry.   Psychiatric: She has a normal mood and affect. Her speech is normal and behavior is normal. Judgment normal.       No radiology results for the last 7 days     Assessment and plan     1. Change in bowel habits    2. Incontinence of feces with fecal urgency    3. Gastroesophageal reflux disease, esophagitis presence not specified    4. Hiatal hernia    Overall her loose stools with fecal incontinence and urgency has definitely improved since continue the probiotics and adding the fiber.  She still not though 100%.  I want her to increase the fiber to twice daily.  Continue to increase her water and exercise as tolerated.  GERD seems well controlled on her current regimen of omeprazole 40 mg once daily.  Continue probiotics.  Continue GERD precautions.  Patient to call the office with  any issues.  Patient to follow-up with me in 1 month.

## 2020-07-23 ENCOUNTER — OFFICE VISIT (OUTPATIENT)
Dept: GASTROENTEROLOGY | Facility: CLINIC | Age: 75
End: 2020-07-23

## 2020-07-23 VITALS — BODY MASS INDEX: 18.44 KG/M2 | TEMPERATURE: 97 F | WEIGHT: 100.2 LBS | HEIGHT: 62 IN

## 2020-07-23 DIAGNOSIS — R19.7 DIARRHEA, UNSPECIFIED TYPE: Primary | ICD-10-CM

## 2020-07-23 DIAGNOSIS — K21.9 GASTROESOPHAGEAL REFLUX DISEASE, ESOPHAGITIS PRESENCE NOT SPECIFIED: ICD-10-CM

## 2020-07-23 DIAGNOSIS — K44.9 HIATAL HERNIA: ICD-10-CM

## 2020-07-23 DIAGNOSIS — R15.2 FECAL URGENCY: ICD-10-CM

## 2020-07-23 PROCEDURE — 99214 OFFICE O/P EST MOD 30 MIN: CPT | Performed by: NURSE PRACTITIONER

## 2020-07-23 NOTE — PROGRESS NOTES
Chief Complaint   Patient presents with   • change in bowel   • Fecal Incontinence   • Heartburn       Елена Beach is a  75 y.o. female here for a follow up visit for GERD.    HPI  75-year-old female presents today for follow-up visit for GERD.  She is a patient of Dr. Marshall.  She was last seen in the office on 6/25/2020.  She is happy to report that her reflux is doing well on omeprazole 40 mg once daily.  She denies any breakthrough reflux at this time.  She also is happy to report that her bowels are doing so much better since starting a daily probiotic and increasing her fiber to 2 a day.  She is also changed her iron routine and is taking an iron supplement every day now instead of every other day.  She also thinks this is contributing to her improvement in her bowel habits.  She is not having near as much urgency or looseness.  She will still have urgency right after drinking her morning tea but otherwise it is much improved.  She denies any dysphagia, reflux, abdominal pain, nausea and vomiting, constipation, rectal bleeding or melena.  She is appetite is good and her weight is stable.  Past Medical History:   Diagnosis Date   • Anemia    • Arthritis    • Asthma    • Back pain    • Bursitis 1995   • COPD (chronic obstructive pulmonary disease) (CMS/[facility])    • Fibromyalgia    • Fibromyositis 1995   • GERD (gastroesophageal reflux disease)    • Hiatal hernia    • History of pneumonia    • Hyperlipidemia    • Knee pain    • Osteoarthritis 2008   • Osteoporosis    • PONV (postoperative nausea and vomiting)    • Scoliosis 1995       Past Surgical History:   Procedure Laterality Date   • COLONOSCOPY     • ENDOSCOPY     • FOOT SURGERY Right 2006   • HAND SURGERY  2004   • SHOULDER ARTHROSCOPY  2014   • SHOULDER SURGERY Left    • TOE SURGERY Right    • TOOTH EXTRACTION     • TOTAL KNEE ARTHROPLASTY Right 12/27/2018    Procedure: TOTAL KNEE ARTHROPLASTY;  Surgeon: Gerry Estevez MD;  Location: [facility]  "OR;  Service: Orthopedics   • TUBAL ABDOMINAL LIGATION     • WRIST SURGERY         Scheduled Meds:    Continuous Infusions:  No current facility-administered medications for this visit.     PRN Meds:.    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   • Codeine Other (See Comments)     Keeps awake   • Ibuprofen Other (See Comments)     Can't take due to hiatal hernia   • Keflex [Cephalexin] Other (See Comments)     Makes feel \"goofy\"   • Pseudoephedrine Other (See Comments)     Heart palpitations       Social History     Socioeconomic History   • Marital status: Single     Spouse name: Not on file   • Number of children: 1   • Years of education: Not on file   • Highest education level: Not on file   Occupational History   • Occupation: [employer]     Employer: RETIRED   Tobacco Use   • Smoking status: Former Smoker     Packs/day: 1.00     Years: 40.00     Pack years: 40.00     Types: Cigarettes     Last attempt to quit: 11/1/2016     Years since quitting: 3.7   • Smokeless tobacco: Never Used   Substance and Sexual Activity   • Alcohol use: Yes     Alcohol/week: 1.0 standard drinks     Types: 1 Shots of liquor per week     Comment: occ   • Drug use: No   • Sexual activity: Not Currently     Partners: Male     Birth control/protection: Other       Family History   Problem Relation Age of Onset   • Heart failure Mother    • Hypertension Mother    • Cancer Mother         breast   • Cancer Father         prostate   • Malig Hyperthermia Neg Hx        Review of Systems   Constitutional: Negative for appetite change, chills, diaphoresis, fatigue, fever and unexpected weight change.   HENT: Negative for nosebleeds, postnasal drip, sore throat, trouble swallowing and voice change.    Respiratory: Negative for cough, choking, chest tightness, shortness of breath and wheezing.  "   Cardiovascular: Negative for chest pain, palpitations and leg swelling.   Gastrointestinal: Positive for abdominal distention. Negative for abdominal pain, anal bleeding, blood in stool, constipation, diarrhea, nausea, rectal pain and vomiting.   Endocrine: Negative for polydipsia, polyphagia and polyuria.   Musculoskeletal: Negative for gait problem.   Skin: Negative for rash and wound.   Allergic/Immunologic: Negative for food allergies.   Neurological: Negative for dizziness, speech difficulty and light-headedness.   Psychiatric/Behavioral: Negative for confusion, self-injury, sleep disturbance and suicidal ideas.       Vitals:    07/23/20 1059   Temp: 97 °F (36.1 °C)       Physical Exam   Constitutional: She is oriented to person, place, and time. She appears well-developed and well-nourished. She does not appear ill. No distress.   HENT:   Head: Normocephalic.   Eyes: Pupils are equal, round, and reactive to light.   Cardiovascular: Normal rate, regular rhythm and normal heart sounds.   Pulmonary/Chest: Effort normal and breath sounds normal.   Abdominal: Soft. Bowel sounds are normal. She exhibits no distension and no mass. There is no hepatosplenomegaly. There is no tenderness. There is no rebound and no guarding. No hernia.   Musculoskeletal: Normal range of motion.   Neurological: She is alert and oriented to person, place, and time.   Skin: Skin is warm and dry.   Psychiatric: She has a normal mood and affect. Her speech is normal and behavior is normal. Judgment normal.       No radiology results for the last 7 days     Assessment and plan     1. Diarrhea, unspecified type    2. Fecal urgency    3. Gastroesophageal reflux disease, esophagitis presence not specified    4. Hiatal hernia    Overall her bowels seem to be moving much better now with her daily probiotic used fiber to twice a day and iron supplement.  Continue a high-fiber diet.  Continue to increase water and exercise as tolerated.  GERD is  well controlled on omeprazole 40 mg once daily.  Continue GERD precautions.  Patient to call the office next week with an update.  Patient to follow-up with me in 1 month.

## 2020-08-03 ENCOUNTER — OFFICE VISIT (OUTPATIENT)
Dept: FAMILY MEDICINE CLINIC | Facility: CLINIC | Age: 75
End: 2020-08-03

## 2020-08-03 VITALS
SYSTOLIC BLOOD PRESSURE: 136 MMHG | TEMPERATURE: 98.4 F | BODY MASS INDEX: 18.03 KG/M2 | WEIGHT: 98 LBS | HEART RATE: 83 BPM | OXYGEN SATURATION: 98 % | HEIGHT: 62 IN | DIASTOLIC BLOOD PRESSURE: 78 MMHG

## 2020-08-03 DIAGNOSIS — R59.1 LYMPHADENOPATHY: Primary | ICD-10-CM

## 2020-08-03 PROCEDURE — 99213 OFFICE O/P EST LOW 20 MIN: CPT | Performed by: NURSE PRACTITIONER

## 2020-08-03 NOTE — PROGRESS NOTES
"Subjective   Елена Beach is a 75 y.o. female.     History of Present Illness   Patient presented to the office today with concerns over left-sided bump that appeared about 3 to 4 weeks ago it is tender to touch it is not changed at all since it popped up has no other signs or symptoms no fatigue she is doing quite well working well appetite is good no weight loss no fever no cough no upper respiratory symptoms.  The following portions of the patient's history were reviewed and updated as appropriate: allergies, current medications, past social history and problem list.    Review of Systems   Musculoskeletal: Positive for neck pain.   All other systems reviewed and are negative.      Objective   /78   Pulse 83   Temp 98.4 °F (36.9 °C) (Oral)   Ht 157.5 cm (62\")   Wt 44.5 kg (98 lb)   SpO2 98%   BMI 17.92 kg/m²   Physical Exam   Constitutional: She is oriented to person, place, and time. Vital signs are normal. She appears well-developed and well-nourished. No distress.   HENT:   Head: Normocephalic.   Cardiovascular: Normal rate, regular rhythm and normal heart sounds.   Pulmonary/Chest: Effort normal and breath sounds normal.   Neurological: She is alert and oriented to person, place, and time. Gait normal.   Psychiatric: She has a normal mood and affect. Her behavior is normal. Judgment and thought content normal.   Vitals reviewed.  1 swollen tender lymph node on the left side of posterior neck.  No other lymph nodes are swollen or tender on bilateral neck    Assessment/Plan      Diagnosis Plan   1. Lymphadenopathy       For now we will continue to watch and wait if it is still there more pop up after 8 more weeks she needs return the office.  Patient verbalized understanding.  Caleb Velez, APRN  8/3/2020  "

## 2020-08-11 DIAGNOSIS — E78.5 HYPERLIPIDEMIA, UNSPECIFIED HYPERLIPIDEMIA TYPE: ICD-10-CM

## 2020-08-11 RX ORDER — ATORVASTATIN CALCIUM 10 MG/1
TABLET, FILM COATED ORAL
Qty: 90 TABLET | Refills: 0 | Status: SHIPPED | OUTPATIENT
Start: 2020-08-11 | End: 2020-10-14

## 2020-08-31 ENCOUNTER — TRANSCRIBE ORDERS (OUTPATIENT)
Dept: ADMINISTRATIVE | Facility: HOSPITAL | Age: 75
End: 2020-08-31

## 2020-08-31 ENCOUNTER — LAB (OUTPATIENT)
Dept: LAB | Facility: HOSPITAL | Age: 75
End: 2020-08-31

## 2020-08-31 DIAGNOSIS — T78.1XXA OTHER ADVERSE FOOD REACTIONS, NOT ELSEWHERE CLASSIFIED, INITIAL ENCOUNTER: Primary | ICD-10-CM

## 2020-08-31 DIAGNOSIS — T78.1XXA OTHER ADVERSE FOOD REACTIONS, NOT ELSEWHERE CLASSIFIED, INITIAL ENCOUNTER: ICD-10-CM

## 2020-08-31 DIAGNOSIS — J30.89 OTHER ALLERGIC RHINITIS: ICD-10-CM

## 2020-08-31 DIAGNOSIS — J30.1 ALLERGIC RHINITIS DUE TO POLLEN, UNSPECIFIED SEASONALITY: ICD-10-CM

## 2020-08-31 DIAGNOSIS — T78.1XXA REACTION TO FOOD, INITIAL ENCOUNTER: Primary | ICD-10-CM

## 2020-08-31 DIAGNOSIS — J44.9 COPD WITH CHRONIC BRONCHITIS (HCC): ICD-10-CM

## 2020-08-31 PROCEDURE — 86003 ALLG SPEC IGE CRUDE XTRC EA: CPT

## 2020-08-31 PROCEDURE — 36415 COLL VENOUS BLD VENIPUNCTURE: CPT

## 2020-08-31 PROCEDURE — 82785 ASSAY OF IGE: CPT

## 2020-09-02 LAB
CATFISH IGE QN: <0.1 KU/L
CODFISH IGE QN: <0.1 KU/L
FLOUNDER IGE QN: <0.1 KU/L
MACKEREL IGE QN: <0.1 KU/L
SALMON IGE QN: <0.1 KU/L
TOTAL IGE SMQN RAST: 5 IU/ML (ref 6–495)
WALLEYE PIKE IGE QN: <0.1 KU/L

## 2020-09-03 LAB
BASS IGE QN: <0.35 KU/L
Lab: 0

## 2020-09-15 ENCOUNTER — OFFICE VISIT (OUTPATIENT)
Dept: GASTROENTEROLOGY | Facility: CLINIC | Age: 75
End: 2020-09-15

## 2020-09-15 VITALS — TEMPERATURE: 97 F | HEIGHT: 62 IN | WEIGHT: 101.8 LBS | BODY MASS INDEX: 18.74 KG/M2

## 2020-09-15 DIAGNOSIS — R15.9 INCONTINENCE OF FECES WITH FECAL URGENCY: ICD-10-CM

## 2020-09-15 DIAGNOSIS — K44.9 HIATAL HERNIA: ICD-10-CM

## 2020-09-15 DIAGNOSIS — R15.2 INCONTINENCE OF FECES WITH FECAL URGENCY: ICD-10-CM

## 2020-09-15 DIAGNOSIS — R19.7 DIARRHEA, UNSPECIFIED TYPE: Primary | ICD-10-CM

## 2020-09-15 DIAGNOSIS — K21.9 GASTROESOPHAGEAL REFLUX DISEASE, ESOPHAGITIS PRESENCE NOT SPECIFIED: ICD-10-CM

## 2020-09-15 PROCEDURE — 99214 OFFICE O/P EST MOD 30 MIN: CPT | Performed by: NURSE PRACTITIONER

## 2020-09-15 NOTE — PROGRESS NOTES
"Chief Complaint   Patient presents with   • Heartburn   • Diarrhea       Елена Beach is a  75 y.o. female here for a follow up visit for GERD.    HPI  74 yo f presents today for follow up visit for GERD and diarrhea. She is a patient of Dr. Marshall. She was last seen in the office on 7/23/20. She has hx GERD and admits she does well with omeprazole 40 mg daily. She also has hx diarrhea with fecal urgency and even episodes of incontinence. She admits as long as she takes daily probiotics, fiber and Iron her bowels move well. Every once in a while she will still have some fecal urgency But admits its a lot better than it used to be. She denies any dysphagia, reflux, abd pain, N&V, constipation, rectal bleeding or melena. She admits her appetite is good and her weight is stable.       Past Medical History:   Diagnosis Date   • Anemia    • Arthritis    • Asthma    • Back pain    • Bursitis 1995   • COPD (chronic obstructive pulmonary disease) (CMS/[facility])    • Fibromyalgia    • Fibromyositis 1995   • GERD (gastroesophageal reflux disease)    • Hiatal hernia    • History of pneumonia    • Hyperlipidemia    • Knee pain    • Osteoarthritis 2008   • Osteoporosis    • PONV (postoperative nausea and vomiting)    • Scoliosis 1995       Past Surgical History:   Procedure Laterality Date   • COLONOSCOPY     • ENDOSCOPY     • FOOT SURGERY Right 2006   • HAND SURGERY  2004   • SHOULDER ARTHROSCOPY  2014   • SHOULDER SURGERY Left    • TOE SURGERY Right    • TOOTH EXTRACTION     • TOTAL KNEE ARTHROPLASTY Right 12/27/2018    Procedure: TOTAL KNEE ARTHROPLASTY;  Surgeon: Gerry Estevez MD;  Location: [facility];  Service: Orthopedics   • TUBAL ABDOMINAL LIGATION     • WRIST SURGERY         Scheduled Meds:    Continuous Infusions:No current facility-administered medications for this visit.       PRN Meds:.    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   • Codeine Other (See Comments)     Keeps awake   • Ibuprofen Other (See Comments)     Can't take due to hiatal hernia   • Keflex [Cephalexin] Other (See Comments)     Makes feel \"goofy\"   • Pseudoephedrine Other (See Comments)     Heart palpitations       Social History     Socioeconomic History   • Marital status: Single     Spouse name: Not on file   • Number of children: 1   • Years of education: Not on file   • Highest education level: Not on file   Occupational History   • Occupation: [employer]     Employer: RETIRED   Tobacco Use   • Smoking status: Former Smoker     Packs/day: 1.00     Years: 40.00     Pack years: 40.00     Types: Cigarettes     Quit date: 11/1/2016     Years since quitting: 3.8   • Smokeless tobacco: Never Used   Substance and Sexual Activity   • Alcohol use: Yes     Alcohol/week: 1.0 standard drinks     Types: 1 Shots of liquor per week     Comment: occ   • Drug use: No   • Sexual activity: Not Currently     Partners: Male     Birth control/protection: Other       Family History   Problem Relation Age of Onset   • Heart failure Mother    • Hypertension Mother    • Cancer Mother         breast   • Cancer Father         prostate   • Malig Hyperthermia Neg Hx        Review of Systems   Constitutional: Negative for appetite change, chills, diaphoresis, fatigue, fever and unexpected weight change.   HENT: Negative for nosebleeds, postnasal drip, sore throat, trouble swallowing and voice change.    Respiratory: Negative for cough, choking, chest tightness, shortness of breath and wheezing.    Cardiovascular: Negative for chest pain, palpitations and leg swelling.   Gastrointestinal: Positive for abdominal distention. Negative for abdominal pain, anal bleeding, blood in stool, constipation, diarrhea, nausea, rectal pain and vomiting.   Endocrine: Negative for polydipsia, polyphagia and polyuria.   Musculoskeletal: " Negative for gait problem.   Skin: Negative for rash and wound.   Allergic/Immunologic: Negative for food allergies.   Neurological: Negative for dizziness, speech difficulty and light-headedness.   Psychiatric/Behavioral: Negative for confusion, self-injury, sleep disturbance and suicidal ideas.       Vitals:    09/15/20 1131   Temp: 97 °F (36.1 °C)       Physical Exam  Constitutional:       General: She is not in acute distress.     Appearance: She is well-developed. She is not ill-appearing.   HENT:      Head: Normocephalic.   Eyes:      Pupils: Pupils are equal, round, and reactive to light.   Cardiovascular:      Rate and Rhythm: Normal rate and regular rhythm.      Heart sounds: Normal heart sounds.   Pulmonary:      Effort: Pulmonary effort is normal.      Breath sounds: Normal breath sounds.   Abdominal:      General: Bowel sounds are normal. There is no distension.      Palpations: Abdomen is soft. There is no mass.      Tenderness: There is no abdominal tenderness. There is no guarding or rebound.      Hernia: No hernia is present.   Musculoskeletal: Normal range of motion.   Skin:     General: Skin is warm and dry.   Neurological:      Mental Status: She is alert and oriented to person, place, and time.   Psychiatric:         Speech: Speech normal.         Behavior: Behavior normal.         Judgment: Judgment normal.         No radiology results for the last 7 days     Assessment & Plan     1. Diarrhea, unspecified type    2. Incontinence of feces with fecal urgency    3. Gastroesophageal reflux disease, esophagitis presence not specified    4. Hiatal hernia      GERD seems well controlled with omeprazole 40 mg daily. Continue GERD precautions. Diarrhea is better as long as she takes daily fiber, probiotics and Iron. Hgb is stable. Bowels are moving well. Eating well. Call office with any issues. Follow up with me in 6 months.

## 2020-10-07 ENCOUNTER — OFFICE VISIT (OUTPATIENT)
Dept: ORTHOPEDIC SURGERY | Facility: CLINIC | Age: 75
End: 2020-10-07

## 2020-10-07 VITALS — TEMPERATURE: 97.4 F | HEIGHT: 62 IN | WEIGHT: 100 LBS | BODY MASS INDEX: 18.4 KG/M2

## 2020-10-07 DIAGNOSIS — M25.552 LEFT HIP PAIN: Primary | ICD-10-CM

## 2020-10-07 DIAGNOSIS — M16.12 PRIMARY OSTEOARTHRITIS OF LEFT HIP: ICD-10-CM

## 2020-10-07 PROCEDURE — 99214 OFFICE O/P EST MOD 30 MIN: CPT | Performed by: NURSE PRACTITIONER

## 2020-10-07 PROCEDURE — 73502 X-RAY EXAM HIP UNI 2-3 VIEWS: CPT | Performed by: NURSE PRACTITIONER

## 2020-10-07 RX ORDER — METHYLPREDNISOLONE 4 MG/1
TABLET ORAL
Qty: 21 TABLET | Refills: 0 | Status: SHIPPED | OUTPATIENT
Start: 2020-10-07 | End: 2021-01-28

## 2020-10-07 NOTE — PROGRESS NOTES
Patient Name: Елена Beach   YOB: 1945  Referring Primary Care Physician: Caleb Velez APRN  BMI: Body mass index is 18.29 kg/m².    Chief Complaint:    Chief Complaint   Patient presents with   • Left Hip - Pain        HPI: Pt of [facility] here for new onset left hip pain for 3 weeks. Pt has pain in groin. Pt has no hx of back problems. Pt has a hx of copd and is on zithromax daily.  Denies any fall or injury she has been very active and the hip pain has sidelined her.    Елена Beach is a 75 y.o. female who presents today for evaluation of   Chief Complaint   Patient presents with   • Left Hip - Pain       This problem is new to this examiner.     Subjective   Medications:   Home Medications:  Current Outpatient Medications on File Prior to Visit   Medication Sig   • atorvastatin (LIPITOR) 10 MG tablet Take 1 tablet by mouth nightly   • azelastine (ASTELIN) 0.1 % nasal spray 2 sprays into the nostril(s) as directed by provider.   • azithromycin (ZITHROMAX) 250 MG tablet TAKE 1 TABLET BY MOUTH 5 TIMES A WEEK (MONDAY FRIDAY)   • budesonide-formoterol (SYMBICORT) 160-4.5 MCG/ACT inhaler Inhale 2 puffs 2 (Two) Times a Day.   • Calcium Citrate-Vitamin D (CALCIUM CITRATE + D PO) Take 1 tablet by mouth Daily.   • cetirizine (zyrTEC) 10 MG tablet Take 10 mg by mouth Daily.   • Ferrous Gluconate (IRON 27 PO) Take 1 tablet by mouth Every Other Day.   • glucosamine sulfate 500 MG capsule capsule Take  by mouth 3 (Three) Times a Day With Meals.   • GuaiFENesin (MUCINEX PO) Take 1,200 mg by mouth 2 (Two) Times a Day.   • Methylcellulose, Laxative, (CITRUCEL PO) Take  by mouth.   • montelukast (SINGULAIR) 10 MG tablet Take 10 mg by mouth Every Night.   • Multiple Vitamins-Minerals (MULTIVITAMIN ADULT PO) Take 1 tablet by mouth Daily.   • omeprazole (priLOSEC) 40 MG capsule Take 1 capsule by mouth Every Morning.   • probiotic (CULTURELLE) capsule capsule Take  by mouth Daily.   • tiotropium (SPIRIVA) 18 MCG per  inhalation capsule Place 1 capsule into inhaler and inhale Daily.   • albuterol 108 (90 Base) MCG/ACT inhaler Inhale 2 puffs Every 4 (Four) Hours As Needed for Wheezing.   • arformoterol (BROVANA) 15 MCG/2ML nebulizer solution Take 2 mL by nebulization 2 (Two) Times a Day.   • fluticasone (FLONASE) 50 MCG/ACT nasal spray 2 sprays into the nostril(s) as directed by provider As Needed.   • ipratropium-albuterol (DUO-NEB) 0.5-2.5 mg/3 ml nebulizer Take 3 mL by nebulization 4 (Four) Times a Day.   • NON FORMULARY 1 capsule 2 (Two) Times a Day. instaflex     No current facility-administered medications on file prior to visit.      Current Medications:  Scheduled Meds:  Continuous Infusions:No current facility-administered medications for this visit.     PRN Meds:.    I have reviewed the patient's medical history in detail and updated the computerized patient record.  Review and summarization of old records includes:    Past Medical History:   Diagnosis Date   • Anemia    • Arthritis    • Asthma    • Back pain    • Bursitis 1995   • COPD (chronic obstructive pulmonary disease) (CMS/[facility])    • Fibromyalgia    • Fibromyositis 1995   • GERD (gastroesophageal reflux disease)    • Hiatal hernia    • History of pneumonia    • Hyperlipidemia    • Knee pain    • Osteoarthritis 2008   • Osteoporosis    • PONV (postoperative nausea and vomiting)    • Scoliosis 1995        Past Surgical History:   Procedure Laterality Date   • COLONOSCOPY     • ENDOSCOPY     • FOOT SURGERY Right 2006   • HAND SURGERY  2004   • SHOULDER ARTHROSCOPY  2014   • SHOULDER SURGERY Left    • TOE SURGERY Right    • TOOTH EXTRACTION     • TOTAL KNEE ARTHROPLASTY Right 12/27/2018    Procedure: TOTAL KNEE ARTHROPLASTY;  Surgeon: Gerry Estevez MD;  Location: [facility] OR;  Service: Orthopedics   • TUBAL ABDOMINAL LIGATION     • WRIST SURGERY          Social History     Occupational History   • Occupation: [email or web address]     Employer: RETIRED   Tobacco Use   •  "Smoking status: Former Smoker     Packs/day: 1.00     Years: 40.00     Pack years: 40.00     Types: Cigarettes     Quit date: 11/1/2016     Years since quitting: 3.9   • Smokeless tobacco: Never Used   Substance and Sexual Activity   • Alcohol use: Yes     Alcohol/week: 1.0 standard drinks     Types: 1 Shots of liquor per week     Comment: occ   • Drug use: No   • Sexual activity: Not Currently     Partners: Male     Birth control/protection: Other      Social History     Social History Narrative   • Not on file        Family History   Problem Relation Age of Onset   • Heart failure Mother    • Hypertension Mother    • Cancer Mother         breast   • Cancer Father         prostate   • Malig Hyperthermia Neg Hx        ROS: 14 point review of systems was performed and all other systems were reviewed and are negative except for documented findings in HPI and today's encounter.     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   • Codeine Other (See Comments)     Keeps awake   • Ibuprofen Other (See Comments)     Can't take due to hiatal hernia   • Keflex [Cephalexin] Other (See Comments)     Makes feel \"goofy\"   • Pseudoephedrine Other (See Comments)     Heart palpitations     Constitutional:  Denies fever, shaking or chills   Eyes:  Denies change in visual acuity   HENT:  Denies nasal congestion or sore throat   Respiratory:  Denies cough or shortness of breath   Cardiovascular:  Denies chest pain or severe LE edema   GI:  Denies abdominal pain, nausea, vomiting, bloody stools or diarrhea   Musculoskeletal:  Numbness, tingling, pain, or loss of motor function only as noted above in history of present illness.  : Denies painful urination or hematuria  Integument:  Denies rash, lesion or ulceration   Neurologic:  Denies headache or focal weakness  Endocrine:  " "Denies lymphadenopathy  Psych:  Denies confusion or change in mental status   Hem:  Denies active bleeding    OBJECTIVE:  Physical Exam: 75 y.o. female  Wt Readings from Last 3 Encounters:   10/07/20 45.4 kg (100 lb)   09/15/20 46.2 kg (101 lb 12.8 oz)   08/03/20 44.5 kg (98 lb)     Ht Readings from Last 1 Encounters:   10/07/20 157.5 cm (62\")     Body mass index is 18.29 kg/m².  Vitals:    10/07/20 1406   Temp: 97.4 °F (36.3 °C)     Vital signs reviewed.     General Appearance:    Alert, cooperative, in no acute distress                  Eyes: conjunctiva clear  ENT: external ears and nose atraumatic  CV: no peripheral edema  Resp: normal respiratory effort  Skin: no rashes or wounds; normal turgor  Psych: mood and affect appropriate  Lymph: no nodes appreciated  Neuro: gross sensation intact  Vascular:  Palpable peripheral pulse in noted extremity  Musculoskeletal Extremities: Left hip skin is warm dry intact with good pulse sensation calves are soft and nontender she ambulates with antalgic gait pain with internal rotation of the hip no tenderness over the greater trochanter bursa Stinchfield is positive  Radiology:   Views left hip done for pain show advanced osteoarthritic changes in the left hip no comparison films done for pain    Assessment:     ICD-10-CM ICD-9-CM   1. Left hip pain  M25.552 719.45   2. Primary osteoarthritis of left hip  M16.12 715.15        Procedures   will try a medrol dose pack - discussed injection under fluro to follow with [facility]  Plan: Biomechanics of pertinent body area discussed.  Risks, benefits, alternatives, comparisons, and complications of accepted medicines, injections, recommendations, surgical procedures, and therapies explained and education provided in laymen's terms. Natural history and expected course of this patient's diagnosis discussed along with evaluation of therapies. Questions answered. When appropriate I also discussed proper use of cane, walker, trekking poles. "   EXERCISES:  Advice on benefits of, and types of regular/moderate exercise including biomechanical forces involved as it pertains to this complaint.  MEDICATIONS:  Prescription, OTC and Monitoring of Medications per orders to address ortho complaints; Evaluation and discussion of safety, precautions, side effects, and warnings given especially of long term NSAID or steroid therapy.    RICE: Rest, ice, compression, and elevation therapy, Cryotherapy/brachy therapy, and or OTC linaments as indicated with instructions.       10/7/2020    Much of this encounter note is an electronic transcription/translation of spoken language to printed text. The electronic translation of spoken language may permit erroneous, or at times, nonsensical words or phrases to be inadvertently transcribed; Although I have reviewed the note for such errors, some may still exist

## 2020-10-07 NOTE — PATIENT INSTRUCTIONS
"Osteoarthritis    Osteoarthritis is a type of arthritis that affects tissue that covers the ends of bones in joints (cartilage). Cartilage acts as a cushion between the bones and helps them move smoothly. Osteoarthritis results when cartilage in the joints gets worn down. Osteoarthritis is sometimes called \"wear and tear\" arthritis.  Osteoarthritis is the most common form of arthritis. It often occurs in older people. It is a condition that gets worse over time (a progressive condition). Joints that are most often affected by this condition are in:  · Fingers.  · Toes.  · Hips.  · Knees.  · Spine, including neck and lower back.  What are the causes?  This condition is caused by age-related wearing down of cartilage that covers the ends of bones.  What increases the risk?  The following factors may make you more likely to develop this condition:  · Older age.  · Being overweight or obese.  · Overuse of joints, such as in athletes.  · Past injury of a joint.  · Past surgery on a joint.  · Family history of osteoarthritis.  What are the signs or symptoms?  The main symptoms of this condition are pain, swelling, and stiffness in the joint. The joint may lose its shape over time. Small pieces of bone or cartilage may break off and float inside of the joint, which may cause more pain and damage to the joint. Small deposits of bone (osteophytes) may grow on the edges of the joint. Other symptoms may include:  · A grating or scraping feeling inside the joint when you move it.  · Popping or creaking sounds when you move.  Symptoms may affect one or more joints. Osteoarthritis in a major joint, such as your knee or hip, can make it painful to walk or exercise. If you have osteoarthritis in your hands, you might not be able to  items, twist your hand, or control small movements of your hands and fingers (fine motor skills).  How is this diagnosed?  This condition may be diagnosed based on:  · Your medical history.  · A " physical exam.  · Your symptoms.  · X-rays of the affected joint(s).  · Blood tests to rule out other types of arthritis.  How is this treated?  There is no cure for this condition, but treatment can help to control pain and improve joint function. Treatment plans may include:  · A prescribed exercise program that allows for rest and joint relief. You may work with a physical therapist.  · A weight control plan.  · Pain relief techniques, such as:  ? Applying heat and cold to the joint.  ? Electric pulses delivered to nerve endings under the skin (transcutaneous electrical nerve stimulation, or TENS).  ? Massage.  ? Certain nutritional supplements.  · NSAIDs or prescription medicines to help relieve pain.  · Medicine to help relieve pain and inflammation (corticosteroids). This can be given by mouth (orally) or as an injection.  · Assistive devices, such as a brace, wrap, splint, specialized glove, or cane.  · Surgery, such as:  ? An osteotomy. This is done to reposition the bones and relieve pain or to remove loose pieces of bone and cartilage.  ? Joint replacement surgery. You may need this surgery if you have very bad (advanced) osteoarthritis.  Follow these instructions at home:  Activity  · Rest your affected joints as directed by your health care provider.  · Do not drive or use heavy machinery while taking prescription pain medicine.  · Exercise as directed. Your health care provider or physical therapist may recommend specific types of exercise, such as:  ? Strengthening exercises. These are done to strengthen the muscles that support joints that are affected by arthritis. They can be performed with weights or with exercise bands to add resistance.  ? Aerobic activities. These are exercises, such as brisk walking or water aerobics, that get your heart pumping.  ? Range-of-motion activities. These keep your joints easy to move.  ? Balance and agility exercises.  Managing pain, stiffness, and swelling          · If directed, apply heat to the affected area as often as told by your health care provider. Use the heat source that your health care provider recommends, such as a moist heat pack or a heating pad.  ? If you have a removable assistive device, remove it as told by your health care provider.  ? Place a towel between your skin and the heat source. If your health care provider tells you to keep the assistive device on while you apply heat, place a towel between the assistive device and the heat source.  ? Leave the heat on for 20-30 minutes.  ? Remove the heat if your skin turns bright red. This is especially important if you are unable to feel pain, heat, or cold. You may have a greater risk of getting burned.  · If directed, put ice on the affected joint:  ? If you have a removable assistive device, remove it as told by your health care provider.  ? Put ice in a plastic bag.  ? Place a towel between your skin and the bag. If your health care provider tells you to keep the assistive device on during icing, place a towel between the assistive device and the bag.  ? Leave the ice on for 20 minutes, 2-3 times a day.  General instructions  · Take over-the-counter and prescription medicines only as told by your health care provider.  · Maintain a healthy weight. Follow instructions from your health care provider for weight control. These may include dietary restrictions.  · Do not use any products that contain nicotine or tobacco, such as cigarettes and e-cigarettes. These can delay bone healing. If you need help quitting, ask your health care provider.  · Use assistive devices as directed by your health care provider.  · Keep all follow-up visits as told by your health care provider. This is important.  Where to find more information  · National [place] of Arthritis and Musculoskeletal and Skin Diseases: www.niams.nih.gov  · National [place] on Aging: www.briseida.nih.gov  · American College of Rheumatology:  www.rheumatology.org  Contact a health care provider if:  · Your skin turns red.  · You develop a rash.  · You have pain that gets worse.  · You have a fever along with joint or muscle aches.  Get help right away if:  · You lose a lot of weight.  · You suddenly lose your appetite.  · You have night sweats.  Summary  · Osteoarthritis is a type of arthritis that affects tissue covering the ends of bones in joints (cartilage).  · This condition is caused by age-related wearing down of cartilage that covers the ends of bones.  · The main symptom of this condition is pain, swelling, and stiffness in the joint.  · There is no cure for this condition, but treatment can help to control pain and improve joint function.  This information is not intended to replace advice given to you by your health care provider. Make sure you discuss any questions you have with your health care provider.  Document Released: 12/18/2006 Document Revised: 11/30/2018 Document Reviewed: 08/21/2017  [employer]vier Patient Education © 2020 [employer]

## 2020-10-13 DIAGNOSIS — E78.5 HYPERLIPIDEMIA, UNSPECIFIED HYPERLIPIDEMIA TYPE: ICD-10-CM

## 2020-10-14 RX ORDER — ATORVASTATIN CALCIUM 10 MG/1
TABLET, FILM COATED ORAL
Qty: 90 TABLET | Refills: 0 | Status: SHIPPED | OUTPATIENT
Start: 2020-10-14 | End: 2021-02-12

## 2021-01-27 ENCOUNTER — TRANSCRIBE ORDERS (OUTPATIENT)
Dept: ADMINISTRATIVE | Facility: HOSPITAL | Age: 76
End: 2021-01-27

## 2021-01-27 DIAGNOSIS — Z12.39 SCREENING BREAST EXAMINATION: Primary | ICD-10-CM

## 2021-01-28 ENCOUNTER — OFFICE VISIT (OUTPATIENT)
Dept: FAMILY MEDICINE CLINIC | Facility: CLINIC | Age: 76
End: 2021-01-28

## 2021-01-28 VITALS
OXYGEN SATURATION: 98 % | DIASTOLIC BLOOD PRESSURE: 70 MMHG | TEMPERATURE: 96.9 F | BODY MASS INDEX: 18.4 KG/M2 | SYSTOLIC BLOOD PRESSURE: 112 MMHG | HEIGHT: 62 IN | WEIGHT: 100 LBS | HEART RATE: 77 BPM

## 2021-01-28 DIAGNOSIS — Z13.1 SCREENING FOR DIABETES MELLITUS: ICD-10-CM

## 2021-01-28 DIAGNOSIS — D50.9 IRON DEFICIENCY ANEMIA, UNSPECIFIED IRON DEFICIENCY ANEMIA TYPE: ICD-10-CM

## 2021-01-28 DIAGNOSIS — E78.5 HYPERLIPIDEMIA, UNSPECIFIED HYPERLIPIDEMIA TYPE: ICD-10-CM

## 2021-01-28 DIAGNOSIS — Z00.00 MEDICARE ANNUAL WELLNESS VISIT, SUBSEQUENT: Primary | ICD-10-CM

## 2021-01-28 PROCEDURE — G0439 PPPS, SUBSEQ VISIT: HCPCS | Performed by: NURSE PRACTITIONER

## 2021-01-28 NOTE — PROGRESS NOTES
The ABCs of the Annual Wellness Visit  Subsequent Medicare Wellness Visit    Chief Complaint   Patient presents with   • Medicare Wellness-subsequent     Patient is not fasting ( wore mask and goggles)        Subjective   History of Present Illness:  Елена Beach is a 75 y.o. female who presents for a Subsequent Medicare Wellness Visit.    HEALTH RISK ASSESSMENT    Recent Hospitalizations:  No hospitalization(s) within the last year.    Current Medical Providers:  Patient Care Team:  Caleb Velez APRN as PCP - General (Family Medicine)  Izabel Villa MD as Consulting Physician (Pulmonary Disease)    Smoking Status:  Social History     Tobacco Use   Smoking Status Former Smoker   • Packs/day: 1.00   • Years: 40.00   • Pack years: 40.00   • Types: Cigarettes   • Quit date: 2016   • Years since quittin.2   Smokeless Tobacco Never Used       Alcohol Consumption:  Social History     Substance and Sexual Activity   Alcohol Use Yes   • Alcohol/week: 1.0 standard drinks   • Types: 1 Shots of liquor per week    Comment: occ       Depression Screen:   PHQ-2/PHQ-9 Depression Screening 2021   Little interest or pleasure in doing things 1   Feeling down, depressed, or hopeless 1   Trouble falling or staying asleep, or sleeping too much 3   Feeling tired or having little energy 0   Poor appetite or overeating 0   Feeling bad about yourself - or that you are a failure or have let yourself or your family down 0   Trouble concentrating on things, such as reading the newspaper or watching television 0   Moving or speaking so slowly that other people could have noticed. Or the opposite - being so fidgety or restless that you have been moving around a lot more than usual 0   Thoughts that you would be better off dead, or of hurting yourself in some way 0   Total Score 5   If you checked off any problems, how difficult have these problems made it for you to do your work, take care of things at home, or get along  with other people? Somewhat difficult       Fall Risk Screen:  ROMEL Fall Risk Assessment has not been completed.    Health Habits and Functional and Cognitive Screening:  Functional & Cognitive Status 1/28/2021   Do you have difficulty preparing food and eating? No   Do you have difficulty bathing yourself, getting dressed or grooming yourself? No   Do you have difficulty using the toilet? No   Do you have difficulty moving around from place to place? No   Do you have trouble with steps or getting out of a bed or a chair? No   Current Diet Well Balanced Diet   Dental Exam Up to date   Eye Exam Up to date   Exercise (times per week) 3 times per week   Current Exercise Activities Include Walking   Do you need help using the phone?  No   Are you deaf or do you have serious difficulty hearing?  No   Do you need help with transportation? No   Do you need help shopping? No   Do you need help preparing meals?  No   Do you need help with housework?  No   Do you need help with laundry? No   Do you need help taking your medications? No   Do you need help managing money? No   Do you ever drive or ride in a car without wearing a seat belt? No         Does the patient have evidence of cognitive impairment? No    Asprin use counseling:Taking ASA appropriately as indicated    Age-appropriate Screening Schedule:  Refer to the list below for future screening recommendations based on patient's age, sex and/or medical conditions. Orders for these recommended tests are listed in the plan section. The patient has been provided with a written plan.    Health Maintenance   Topic Date Due   • ZOSTER VACCINE (1 of 2) 03/22/1996   • LIPID PANEL  01/27/2021   • MAMMOGRAM  01/31/2022   • DXA SCAN  05/26/2022   • COLONOSCOPY  02/01/2026   • TDAP/TD VACCINES (3 - Td) 10/03/2026   • INFLUENZA VACCINE  Completed          The following portions of the patient's history were reviewed and updated as appropriate: allergies, current medications, past  family history, past medical history, past social history, past surgical history and problem list.    Outpatient Medications Prior to Visit   Medication Sig Dispense Refill   • atorvastatin (LIPITOR) 10 MG tablet Take 1 tablet by mouth nightly 90 tablet 0   • azelastine (ASTELIN) 0.1 % nasal spray 2 sprays into the nostril(s) as directed by provider.     • Calcium Citrate-Vitamin D (CALCIUM CITRATE + D PO) Take 1 tablet by mouth Daily.     • cetirizine (zyrTEC) 10 MG tablet Take 10 mg by mouth Daily.     • Ferrous Gluconate (IRON 27 PO) Take 1 tablet by mouth Every Other Day.     • glucosamine sulfate 500 MG capsule capsule Take  by mouth 3 (Three) Times a Day With Meals.     • GuaiFENesin (MUCINEX PO) Take 1,200 mg by mouth 2 (Two) Times a Day.     • montelukast (SINGULAIR) 10 MG tablet Take 10 mg by mouth Every Night.     • Multiple Vitamins-Minerals (MULTIVITAMIN ADULT PO) Take 1 tablet by mouth Daily.     • NON FORMULARY 1 capsule 2 (Two) Times a Day. instaflex     • omeprazole (priLOSEC) 40 MG capsule Take 1 capsule by mouth Every Morning. 90 capsule 3   • probiotic (CULTURELLE) capsule capsule Take  by mouth Daily.     • Trelegy Ellipta 100-62.5-25 MCG/INH aerosol powder       • albuterol 108 (90 Base) MCG/ACT inhaler Inhale 2 puffs Every 4 (Four) Hours As Needed for Wheezing.     • arformoterol (BROVANA) 15 MCG/2ML nebulizer solution Take 2 mL by nebulization 2 (Two) Times a Day. 120 mL 0   • azithromycin (ZITHROMAX) 250 MG tablet TAKE 1 TABLET BY MOUTH 5 TIMES A WEEK (MONDAY FRIDAY)     • budesonide-formoterol (SYMBICORT) 160-4.5 MCG/ACT inhaler Inhale 2 puffs 2 (Two) Times a Day.     • fluticasone (FLONASE) 50 MCG/ACT nasal spray 2 sprays into the nostril(s) as directed by provider As Needed.     • ipratropium-albuterol (DUO-NEB) 0.5-2.5 mg/3 ml nebulizer Take 3 mL by nebulization 4 (Four) Times a Day. 360 mL 3   • Methylcellulose, Laxative, (CITRUCEL PO) Take  by mouth.     • methylPREDNISolone (MEDROL) 4  "MG dose pack Take as directed on package instructions. 21 tablet 0   • tiotropium (SPIRIVA) 18 MCG per inhalation capsule Place 1 capsule into inhaler and inhale Daily.       No facility-administered medications prior to visit.        Patient Active Problem List   Diagnosis   • Fibromyalgia   • Arthritis   • Hiatal hernia   • Anxiety and depression   • Asthma   • Hyperlipidemia   • Iron deficiency anemia   • Chronic obstructive pulmonary disease (CMS/[facility])   • Chronic fatigue   • Snoring   • Dependence on nicotine from cigarettes   • Insomnia   • Osteopenia   • Screening for diabetes mellitus   • Shortness of breath   • Family history of hypothyroidism   • Diarrhea   • Lymphadenopathy       Advanced Care Planning:  ACP discussion was held with the patient during this visit. Patient has an advance directive in EMR which is still valid.     Review of Systems    Compared to one year ago, the patient feels her physical health is the same.  Compared to one year ago, the patient feels her mental health is the same.    Reviewed chart for potential of high risk medication in the elderly: yes  Reviewed chart for potential of harmful drug interactions in the elderly:yes    Objective         Vitals:    01/28/21 1012   BP: 112/70   Pulse: 77   Temp: 96.9 °F (36.1 °C)   SpO2: 98%   Weight: 45.4 kg (100 lb)   Height: 157.5 cm (62.01\")   PainSc: 0-No pain       Body mass index is 18.29 kg/m².  Discussed the patient's BMI with her. The BMI is in the acceptable range.    Physical Exam          Assessment/Plan   Medicare Risks and Personalized Health Plan  CMS Preventative Services Quick Reference  Advance Directive Discussion  Breast Cancer/Mammogram Screening  Cardiovascular risk  Diabetic Lab Screening   Immunizations Discussed/Encouraged (specific immunizations; Shingrix )    The above risks/problems have been discussed with the patient.  Pertinent information has been shared with the patient in the After Visit Summary.  Follow up " plans and orders are seen below in the Assessment/Plan Section.    Diagnoses and all orders for this visit:    1. Medicare annual wellness visit, subsequent (Primary)    2. Hyperlipidemia, unspecified hyperlipidemia type  -     Comprehensive Metabolic Panel  -     Lipid Panel With LDL / HDL Ratio    3. Iron deficiency anemia, unspecified iron deficiency anemia type  -     CBC & Differential  -     Iron Profile    4. Screening for diabetes mellitus  -     Comprehensive Metabolic Panel      Follow Up:  Return in about 6 months (around 7/28/2021) for Recheck.     An After Visit Summary and PPPS were given to the patient.

## 2021-02-04 LAB
ALBUMIN SERPL-MCNC: 4.2 G/DL (ref 3.5–5.2)
ALBUMIN/GLOB SERPL: 1.7 G/DL
ALP SERPL-CCNC: 117 U/L (ref 39–117)
ALT SERPL-CCNC: 26 U/L (ref 1–33)
AST SERPL-CCNC: 33 U/L (ref 1–32)
BASOPHILS # BLD AUTO: 0.05 10*3/MM3 (ref 0–0.2)
BASOPHILS NFR BLD AUTO: 1 % (ref 0–1.5)
BILIRUB SERPL-MCNC: 0.4 MG/DL (ref 0–1.2)
BUN SERPL-MCNC: 8 MG/DL (ref 8–23)
BUN/CREAT SERPL: 16.3 (ref 7–25)
CALCIUM SERPL-MCNC: 9.3 MG/DL (ref 8.6–10.5)
CHLORIDE SERPL-SCNC: 100 MMOL/L (ref 98–107)
CHOLEST SERPL-MCNC: 176 MG/DL (ref 0–200)
CO2 SERPL-SCNC: 31.8 MMOL/L (ref 22–29)
CREAT SERPL-MCNC: 0.49 MG/DL (ref 0.57–1)
EOSINOPHIL # BLD AUTO: 0.49 10*3/MM3 (ref 0–0.4)
EOSINOPHIL NFR BLD AUTO: 10.2 % (ref 0.3–6.2)
ERYTHROCYTE [DISTWIDTH] IN BLOOD BY AUTOMATED COUNT: 11.9 % (ref 12.3–15.4)
GLOBULIN SER CALC-MCNC: 2.5 GM/DL
GLUCOSE SERPL-MCNC: 85 MG/DL (ref 65–99)
HCT VFR BLD AUTO: 41.6 % (ref 34–46.6)
HDLC SERPL-MCNC: 82 MG/DL (ref 40–60)
HGB BLD-MCNC: 14.2 G/DL (ref 12–15.9)
IMM GRANULOCYTES # BLD AUTO: 0.01 10*3/MM3 (ref 0–0.05)
IMM GRANULOCYTES NFR BLD AUTO: 0.2 % (ref 0–0.5)
IRON SATN MFR SERPL: 26 % (ref 20–50)
IRON SERPL-MCNC: 107 MCG/DL (ref 37–145)
LDLC SERPL CALC-MCNC: 78 MG/DL (ref 0–100)
LDLC/HDLC SERPL: 0.94 {RATIO}
LYMPHOCYTES # BLD AUTO: 1.21 10*3/MM3 (ref 0.7–3.1)
LYMPHOCYTES NFR BLD AUTO: 25.3 % (ref 19.6–45.3)
MCH RBC QN AUTO: 32 PG (ref 26.6–33)
MCHC RBC AUTO-ENTMCNC: 34.1 G/DL (ref 31.5–35.7)
MCV RBC AUTO: 93.7 FL (ref 79–97)
MONOCYTES # BLD AUTO: 0.44 10*3/MM3 (ref 0.1–0.9)
MONOCYTES NFR BLD AUTO: 9.2 % (ref 5–12)
NEUTROPHILS # BLD AUTO: 2.59 10*3/MM3 (ref 1.7–7)
NEUTROPHILS NFR BLD AUTO: 54.1 % (ref 42.7–76)
NRBC BLD AUTO-RTO: 0 /100 WBC (ref 0–0.2)
PLATELET # BLD AUTO: 358 10*3/MM3 (ref 140–450)
POTASSIUM SERPL-SCNC: 3.7 MMOL/L (ref 3.5–5.2)
PROT SERPL-MCNC: 6.7 G/DL (ref 6–8.5)
RBC # BLD AUTO: 4.44 10*6/MM3 (ref 3.77–5.28)
SODIUM SERPL-SCNC: 138 MMOL/L (ref 136–145)
TIBC SERPL-MCNC: 407 MCG/DL
TRIGL SERPL-MCNC: 86 MG/DL (ref 0–150)
UIBC SERPL-MCNC: 300 MCG/DL (ref 112–346)
VLDLC SERPL CALC-MCNC: 16 MG/DL (ref 5–40)
WBC # BLD AUTO: 4.79 10*3/MM3 (ref 3.4–10.8)

## 2021-02-12 DIAGNOSIS — E78.5 HYPERLIPIDEMIA, UNSPECIFIED HYPERLIPIDEMIA TYPE: ICD-10-CM

## 2021-02-12 RX ORDER — ATORVASTATIN CALCIUM 10 MG/1
TABLET, FILM COATED ORAL
Qty: 30 TABLET | Refills: 0 | Status: SHIPPED | OUTPATIENT
Start: 2021-02-12 | End: 2021-03-15

## 2021-03-02 DIAGNOSIS — Z23 IMMUNIZATION DUE: ICD-10-CM

## 2021-03-09 RX ORDER — CITALOPRAM 10 MG/1
10 TABLET ORAL DAILY
Qty: 90 TABLET | Refills: 2 | Status: SHIPPED | OUTPATIENT
Start: 2021-03-09 | End: 2022-01-19

## 2021-03-15 DIAGNOSIS — E78.5 HYPERLIPIDEMIA, UNSPECIFIED HYPERLIPIDEMIA TYPE: ICD-10-CM

## 2021-03-15 RX ORDER — ATORVASTATIN CALCIUM 10 MG/1
TABLET, FILM COATED ORAL
Qty: 90 TABLET | Refills: 3 | Status: SHIPPED | OUTPATIENT
Start: 2021-03-15 | End: 2022-02-10

## 2021-04-21 ENCOUNTER — OFFICE VISIT (OUTPATIENT)
Dept: FAMILY MEDICINE CLINIC | Facility: CLINIC | Age: 76
End: 2021-04-21

## 2021-04-21 VITALS
SYSTOLIC BLOOD PRESSURE: 114 MMHG | HEART RATE: 71 BPM | HEIGHT: 62 IN | BODY MASS INDEX: 19.4 KG/M2 | TEMPERATURE: 97.1 F | WEIGHT: 105.4 LBS | DIASTOLIC BLOOD PRESSURE: 60 MMHG | OXYGEN SATURATION: 99 %

## 2021-04-21 DIAGNOSIS — F32.A DEPRESSION, UNSPECIFIED DEPRESSION TYPE: Primary | ICD-10-CM

## 2021-04-21 PROCEDURE — 99213 OFFICE O/P EST LOW 20 MIN: CPT | Performed by: NURSE PRACTITIONER

## 2021-04-21 NOTE — PROGRESS NOTES
"Chief Complaint  Depression (Patient is here for 6 week f/u on celexa ( wore mask and goggles) )    Subjective          Елена Beach presents to [facility]  History of Present Illness  Patient presenting to the office today after starting Celexa 10 mg 6 weeks ago due to some depression and hardships that she has been going through.  Patient has been taking the medication daily as directed without any side effects.  She states it is working very well and she would like to continue the medication as is.  Has no other problems or complaints today.    Objective   Vital Signs:   /60   Pulse 71   Temp 97.1 °F (36.2 °C)   Ht 157.5 cm (62.01\")   Wt 47.8 kg (105 lb 6.4 oz)   SpO2 99%   BMI 19.27 kg/m²     Physical Exam  Vitals reviewed.   Constitutional:       General: She is not in acute distress.     Appearance: She is well-developed.   HENT:      Head: Normocephalic.   Cardiovascular:      Rate and Rhythm: Normal rate and regular rhythm.      Heart sounds: Normal heart sounds.   Pulmonary:      Effort: Pulmonary effort is normal.      Breath sounds: Normal breath sounds.   Neurological:      Mental Status: She is alert and oriented to person, place, and time.      Gait: Gait normal.   Psychiatric:         Behavior: Behavior normal.         Thought Content: Thought content normal.         Judgment: Judgment normal.        Result Review :                 Assessment and Plan    Diagnoses and all orders for this visit:    1. Depression, unspecified depression type (Primary)        Follow Up   No follow-ups on file.  Patient was given instructions and counseling regarding her condition or for health maintenance advice. Please see specific information pulled into the AVS if appropriate.     Continue with medication.  Return to the office as needed.    Mask and googles worn      "

## 2021-04-26 ENCOUNTER — OFFICE VISIT (OUTPATIENT)
Dept: ORTHOPEDIC SURGERY | Facility: CLINIC | Age: 76
End: 2021-04-26

## 2021-04-26 VITALS — BODY MASS INDEX: 19.32 KG/M2 | TEMPERATURE: 96.3 F | HEIGHT: 62 IN | WEIGHT: 105 LBS

## 2021-04-26 DIAGNOSIS — Z96.651 S/P TOTAL KNEE ARTHROPLASTY, RIGHT: Primary | ICD-10-CM

## 2021-04-26 DIAGNOSIS — M76.899 QUADRICEPS TENDONITIS: ICD-10-CM

## 2021-04-26 PROCEDURE — 73562 X-RAY EXAM OF KNEE 3: CPT | Performed by: ORTHOPAEDIC SURGERY

## 2021-04-26 PROCEDURE — 99214 OFFICE O/P EST MOD 30 MIN: CPT | Performed by: ORTHOPAEDIC SURGERY

## 2021-04-26 RX ORDER — MELOXICAM 15 MG/1
15 TABLET ORAL DAILY PRN
Qty: 30 TABLET | Refills: 2 | Status: SHIPPED | OUTPATIENT
Start: 2021-04-26 | End: 2022-01-19

## 2021-04-26 NOTE — PROGRESS NOTES
"Patient:Елена Beach    YOB: 1945    Medical Record Number:[id number]    Chief Complaints: New complaint of right knee pain    History of Present Illness:     76 y.o. female patient who presents for her right knee.  She is now about 2 years out from surgery.  She says the knee was great until 2 months ago.  She does not recall any new injury.  She reports gradual onset anterior pain.  The pain is intermittent, worse with keeping her knee in a flexed position or rising from a seated position.  She also says the pain is worse with going up and down stairs.  She has no pain at rest.  No pain if she keeps the knee straight.  Denies shooting pain down the leg, weakness, numbness or other associated symptoms.  Localizes her pain to the very front of the knee over the patella and distal quadriceps.    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   • Codeine Other (See Comments)     Keeps awake   • Ibuprofen Other (See Comments)     Can't take due to hiatal hernia   • Keflex [Cephalexin] Other (See Comments)     Makes feel \"goofy\"   • Pseudoephedrine Other (See Comments)     Heart palpitations       Home Medications:    Current Outpatient Medications:   •  atorvastatin (LIPITOR) 10 MG tablet, Take 1 tablet by mouth nightly, Disp: 90 tablet, Rfl: 3  •  azelastine (ASTELIN) 0.1 % nasal spray, 2 sprays into the nostril(s) as directed by provider., Disp: , Rfl:   •  Calcium Citrate-Vitamin D (CALCIUM CITRATE + D PO), Take 1 tablet by mouth Daily., Disp: , Rfl:   •  cetirizine (zyrTEC) 10 MG tablet, Take 10 mg by mouth Daily., Disp: , Rfl:   •  citalopram (CeleXA) 10 MG tablet, Take 1 tablet by mouth Daily., Disp: 90 tablet, Rfl: 2  •  Ferrous Gluconate (IRON 27 PO), Take 1 tablet by mouth Every Other Day., Disp: , Rfl:   •  glucosamine sulfate 500 MG " capsule capsule, Take  by mouth 3 (Three) Times a Day With Meals., Disp: , Rfl:   •  GuaiFENesin (MUCINEX PO), Take 1,200 mg by mouth 2 (Two) Times a Day., Disp: , Rfl:   •  montelukast (SINGULAIR) 10 MG tablet, Take 10 mg by mouth Every Night., Disp: , Rfl:   •  Multiple Vitamins-Minerals (MULTIVITAMIN ADULT PO), Take 1 tablet by mouth Daily., Disp: , Rfl:   •  NON FORMULARY, 1 capsule 2 (Two) Times a Day. instaflex, Disp: , Rfl:   •  omeprazole (priLOSEC) 40 MG capsule, Take 1 capsule by mouth Every Morning., Disp: 90 capsule, Rfl: 3  •  probiotic (CULTURELLE) capsule capsule, Take  by mouth Daily., Disp: , Rfl:   •  Trelegy Ellipta 100-62.5-25 MCG/INH aerosol powder , , Disp: , Rfl:   •  meloxicam (MOBIC) 15 MG tablet, Take 1 tablet by mouth Daily As Needed for Moderate Pain . Take as directed with food., Disp: 30 tablet, Rfl: 2    Past Medical History:   Diagnosis Date   • Anemia    • Arthritis    • Asthma    • Back pain    • Bursitis 1995   • COPD (chronic obstructive pulmonary disease) (CMS/[facility])    • Fibromyalgia    • Fibromyositis 1995   • GERD (gastroesophageal reflux disease)    • Hiatal hernia    • History of pneumonia    • Hyperlipidemia    • Knee pain    • Osteoarthritis 2008   • Osteoporosis    • PONV (postoperative nausea and vomiting)    • Scoliosis 1995       Past Surgical History:   Procedure Laterality Date   • COLONOSCOPY     • ENDOSCOPY     • FOOT SURGERY Right 2006   • HAND SURGERY  2004   • SHOULDER ARTHROSCOPY  2014   • SHOULDER SURGERY Left    • TOE SURGERY Right    • TOOTH EXTRACTION     • TOTAL KNEE ARTHROPLASTY Right 12/27/2018    Procedure: TOTAL KNEE ARTHROPLASTY;  Surgeon: Gerry Estevez MD;  Location: [facility];  Service: Orthopedics   • TUBAL ABDOMINAL LIGATION     • WRIST SURGERY         Social History     Occupational History   • Occupation: [employer]     Employer: RETIRED   Tobacco Use   • Smoking status: Former Smoker     Packs/day: 1.00     Years: 40.00     Pack years:  "40.00     Types: Cigarettes     Quit date: 2016     Years since quittin.4   • Smokeless tobacco: Never Used   Vaping Use   • Vaping Use: Never used   Substance and Sexual Activity   • Alcohol use: Yes     Alcohol/week: 1.0 standard drinks     Types: 1 Shots of liquor per week     Comment: occ   • Drug use: No   • Sexual activity: Not Currently     Partners: Male     Birth control/protection: Other      Social History     Social History Narrative   • Not on file       Family History   Problem Relation Age of Onset   • Heart failure Mother    • Hypertension Mother    • Cancer Mother         breast   • Cancer Father         prostate   • Malig Hyperthermia Neg Hx        Review of Systems:      Constitutional: Denies fever, shaking or chills   Eyes: Denies change in visual acuity   HEENT: Denies nasal congestion or sore throat   Respiratory: Denies cough or shortness of breath   Cardiovascular: Denies chest pain or edema  Endocrine: Denies tremors, palpitations, intolerance of heat or cold, polyuria, polydipsia.  GI: Denies abdominal pain, nausea, vomiting, bloody stools or diarrhea  : Denies frequency, urgency, incontinence, retention, or nocturia.  Musculoskeletal: Denies numbness, tingling or loss of motor function except as above  Integument: Denies rash, lesion or ulceration   Neurologic: Denies headache or focal weakness, deficits  Heme: Denies spontaneous or excessive bleeding, epistaxis, hematuria, melena, fatigue, enlarged or tender lymph nodes.      All other pertinent positives and negatives as noted above in HPI.    Physical Exam:76 y.o. female  Vitals:    21 1124   Temp: 96.3 °F (35.7 °C)   Weight: 47.6 kg (105 lb)   Height: 157.5 cm (62.01\")       General:  Patient is awake and alert.  Appears in no acute distress or discomfort.    Psych:  Affect and demeanor are appropriate.    Extremities: Right knee is examined.  Surgical incision is healed.  She has tenderness directly over the quad " tendon just proximal to its insertion on the patella.  No palpable defect.  Quad tendon is intact.  She can straight leg raise.  Hyperflexion beyond about 110 degrees is uncomfortable in the front of her knee.  Patellar grind test is only mildly uncomfortable.  There is no knee effusion.  No knee instability.  Motion is from full extension to about 120 degrees of flexion but again her flexion is uncomfortable beyond about 110 degrees.  Gait is nonantalgic.    Imaging: AP, merchant's and lateral views the right knee are ordered and reviewed to evaluate her complaint.  These are compared to previous x-rays.  No change with respect to the implants.  No obvious loosening, fracture or other abnormality.    Assessment/Plan: Quadriceps tendinitis status post right total knee arthroplasty    I suspect this is probably quadriceps tendinitis.  I recommend a trial of anti-inflammatories and therapy.  She has an allergy to ibuprofen listed on the chart.  I asked her about this.  She tells me that she is not allergic to any anti-inflammatories.  She was told that she should be careful when taking anti-inflammatories because of her hiatal hernia but she tells me she has been able to tolerate them in the past.  I agreed to give her a prescription for meloxicam.  I told her to take it with food and carefully monitor her GI symptoms.  If she has any stomach irritation at all then she needs to quit taking it.  She acknowledged understanding of this information.  I gave her a referral to physical therapy.  I want to see her back in 6 weeks to check her progress.    Gerry Estevez MD    04/26/2021

## 2021-04-30 ENCOUNTER — TELEPHONE (OUTPATIENT)
Dept: GASTROENTEROLOGY | Facility: CLINIC | Age: 76
End: 2021-04-30

## 2021-04-30 ENCOUNTER — TREATMENT (OUTPATIENT)
Dept: PHYSICAL THERAPY | Facility: CLINIC | Age: 76
End: 2021-04-30

## 2021-04-30 DIAGNOSIS — M25.561 ACUTE PAIN OF RIGHT KNEE: ICD-10-CM

## 2021-04-30 DIAGNOSIS — M76.891 TENDINITIS OF RIGHT QUADRICEPS TENDON: Primary | ICD-10-CM

## 2021-04-30 PROCEDURE — 97110 THERAPEUTIC EXERCISES: CPT | Performed by: PHYSICAL THERAPIST

## 2021-04-30 PROCEDURE — 97162 PT EVAL MOD COMPLEX 30 MIN: CPT | Performed by: PHYSICAL THERAPIST

## 2021-04-30 PROCEDURE — 97010 HOT OR COLD PACKS THERAPY: CPT | Performed by: PHYSICAL THERAPIST

## 2021-04-30 NOTE — PROGRESS NOTES
Physical Therapy Initial Evaluation and Plan of Care      Patient: Елена Beach   : 1945  Diagnosis/ICD-10 Code:  No primary diagnosis found.  Referring practitioner: Gerry Estevez MD  Date of Initial Visit: 2021  Today's Date: 2021  Patient seen for Visit count could not be calculated. Make sure you are using a visit which is associated with an episode. sessions           Subjective Evaluation    History of Present Illness  Date of onset: 2021  Mechanism of injury: Been trying to walk, nothing new. Started out of nowhere. Tried Tyelnol for a headache and it didn't make much of a difference. Haven't tried ice or heat. Have avoided squats since surgery. Pain goes away quickly. Feels like it's always stiff. Started anti-inflammatory from MD, taking one a day. Haven't noticed a big difference yet.   I walk for exercise but no other stretches or exercises. Loves yoga but had to stop due to Covid.     Subjective comment: 2018 had R TKA. Now it hurts, MD says it's the tendon on top that is the problem. Not constantly but is very stiff. Sitting for prolonged or crossing legs. Sitting in recliner is OK. Ascending stairs if very painful (has at [facility]). Quality of life: excellent    Pain  Current pain ratin  At best pain ratin  At worst pain ratin  Quality: dull ache  Aggravating factors: stairs and prolonged positioning    Social Support  Lives in: one-story house    Diagnostic Tests  X-ray: normal    Patient Goals  Patient goals for therapy: decreased pain, increased motion and return to sport/leisure activities             Objective          Palpation     Right Tenderness of the rectus femoris.     Additional Palpation Details  Tender quadriceps tendon on R    Active Range of Motion     Right Knee   Flexion: 107 degrees with pain  Extension: 8 degrees     Additional Active Range of Motion Details  Pt reports tightness around the knee with flexion, up to the top of thigh also.      Patellar Mobility     Right Knee Patellar tendons within functional limits include the medial, lateral, superior and inferior.     Strength/Myotome Testing     Right Knee   Flexion: 4+  Extension: 4+        See Exercise, Manual, and Modality Logs for complete treatment.       Functional Outcome Score: KOOS 63%        Assessment & Plan     Assessment  Impairments: abnormal or restricted ROM, activity intolerance, lacks appropriate home exercise program and pain with function  Assessment details: Елена Beach is a 76 y.o. year-old female referred to physical therapy for R knee pain for two months with insidious onset. She presents with a evolving clinical presentation.  She has comorbidities including previous R TKA and no personal factors that may affect her progress in the plan of care.  Signs and symptoms are consistent with physical therapy diagnosis of R quadriceps tendinitis likely from lack of consistent exercise program.  Prognosis: good  Functional Limitations: uncomfortable because of pain and sitting  Goals  Plan Goals: STGs to be met by 2 weeks  Pt will be independent and compliant with initial home exercise program.   Pt will report R knee pain </= 3/10 to increase ease of stair climbing.  Pt will report R knee stiffness improved by 50% with sitting in standard chair.     LTGs to be met by 4 weeks  Pt will be independent and compliant with advanced home exercise program.   Pt will report minimal to no pain with climbing stairs.   Pt will score </=33% on KOOS indicating decreased perceived functional disability.         Plan  Therapy options: will be seen for skilled physical therapy services  Planned modality interventions: cryotherapy, TENS, thermotherapy (hydrocollator packs), ultrasound, high voltage pulsed current (pain management) and iontophoresis  Planned therapy interventions: abdominal trunk stabilization, balance/weight-bearing training, flexibility, functional ROM exercises, gait  training, home exercise program, manual therapy, neuromuscular re-education, soft tissue mobilization, strengthening, therapeutic activities and stretching  Frequency: 2x week  Duration in visits: 12  Duration in weeks: 20  Treatment plan discussed with: patient  Plan details: Assess response to initial HEP. Progress for quad flexibility and strength. Consider US for healing.         Timed:  Manual Therapy:        mins  47776;  Therapeutic Exercise:    16     mins  28304;     Neuromuscular Carlos A:        mins  00839;    Therapeutic Activity:          mins  55820;     Gait Training:           mins  81991;     Ultrasound:          mins  57262;    Electrical Stimulation:         mins  29778 ( );  Iontophoresis         mins 33736  Dry Needling        mins      Untimed:  Electrical Stimulation:         mins  64182 ( );  Mechanical Traction:       mins  73366;     Timed Treatment:   16   mins   Total Treatment:     45   mins    PT SIGNATURE: Radha Ward, PT   DATE TREATMENT INITIATED: 4/30/2021    Initial Certification  Certification Period: 7/29/2021  I certify that the therapy services are furnished while this patient is under my care.  The services outlined above are required by this patient, and will be reviewed every 90 days.     PHYSICIAN: Gerry Estevez MD      DATE:     Please sign and return via fax to [phone] Thank you, [facility].

## 2021-05-03 RX ORDER — OMEPRAZOLE 40 MG/1
40 CAPSULE, DELAYED RELEASE ORAL EVERY MORNING
Qty: 90 CAPSULE | Refills: 1 | Status: SHIPPED | OUTPATIENT
Start: 2021-05-03 | End: 2021-10-28

## 2021-05-05 ENCOUNTER — TREATMENT (OUTPATIENT)
Dept: PHYSICAL THERAPY | Facility: CLINIC | Age: 76
End: 2021-05-05

## 2021-05-05 DIAGNOSIS — M25.561 ACUTE PAIN OF RIGHT KNEE: ICD-10-CM

## 2021-05-05 DIAGNOSIS — M76.891 TENDINITIS OF RIGHT QUADRICEPS TENDON: Primary | ICD-10-CM

## 2021-05-05 PROCEDURE — 97530 THERAPEUTIC ACTIVITIES: CPT | Performed by: PHYSICAL THERAPIST

## 2021-05-05 PROCEDURE — 97110 THERAPEUTIC EXERCISES: CPT | Performed by: PHYSICAL THERAPIST

## 2021-05-05 NOTE — PROGRESS NOTES
"Physical Therapy Daily Progress Note    Patient: Елена Beach   : 1945  Diagnosis/ICD-10 Code:  Tendinitis of right quadriceps tendon [M76.891]  Referring practitioner: Gerry Estevez MD  Date of Initial Visit: Type: THERAPY  Noted: 2021  Today's Date: 2021  Patient seen for 2 sessions           Subjective   Patient reports she has been diligent about completing her home exercises.    Objective   See Exercise, Manual, and Modality Logs for complete treatment.     EXERCISES:  -Recumbent bike 5 min  -Corner cage stretch hamstring and quad (forward lunge), 30 sec each  -4\" Step ups F/L on each leg x20  -LAQ x20 B  -SLR 2x15 B  -Quad sets x20    Assessment/Plan  Discussed possibility of utilizing dry needling in future sessions to help alleviate tension in quad muscle and promote faster healing process. Provided cueing during step ups for patient to weight shift forward and fully straighten knee at top of step. She had difficulty maintaining continuous motion on the bike but reports feeling looser afterwards.          Timed:    Manual Therapy:         mins  23969;  Therapeutic Exercise:    30     mins  06884;     Neuromuscular Carlos A:        mins  28535;    Therapeutic Activity:     9     mins  10966;     Gait Training:           mins  34683;     Ultrasound:          mins  63341;    Electrical Stimulation:         mins  90146 ( );  Iontophoresis         mins 78929;  Aquatic Therapy         mins 36357;  Dry Needling                   mins    Untimed:  Electrical Stimulation:         mins  78365 (MC );  Mechanical Traction:         mins  08561;     Timed Treatment:   39   mins   Total Treatment:     39   mins  Tawanna Cabrera PT  Physical Therapist  "

## 2021-05-11 ENCOUNTER — APPOINTMENT (OUTPATIENT)
Dept: MAMMOGRAPHY | Facility: HOSPITAL | Age: 76
End: 2021-05-11

## 2021-05-12 ENCOUNTER — TREATMENT (OUTPATIENT)
Dept: PHYSICAL THERAPY | Facility: CLINIC | Age: 76
End: 2021-05-12

## 2021-05-12 DIAGNOSIS — M76.891 TENDINITIS OF RIGHT QUADRICEPS TENDON: Primary | ICD-10-CM

## 2021-05-12 DIAGNOSIS — M25.561 ACUTE PAIN OF RIGHT KNEE: ICD-10-CM

## 2021-05-12 PROCEDURE — 97530 THERAPEUTIC ACTIVITIES: CPT | Performed by: PHYSICAL THERAPIST

## 2021-05-12 PROCEDURE — 97112 NEUROMUSCULAR REEDUCATION: CPT | Performed by: PHYSICAL THERAPIST

## 2021-05-12 NOTE — PROGRESS NOTES
Physical Therapy Daily Progress Note    Patient: Елена Beach   : 1945  Diagnosis/ICD-10 Code:  Tendinitis of right quadriceps tendon [M76.891]  Referring practitioner: Gerry Estevez MD  Date of Initial Visit: Type: THERAPY  Noted: 2021  Today's Date: 2021  Patient seen for 3 sessions           Subjective Just got back from [place] last night so feeling a little stiff and sore    Objective     Recumbent bike 5 min with EDUCATION as to need to increase quad sterngth and improve knee mechanics to stop picking on tendon  REC increase use of ice x 10 min 1-3/day     Palpation  in RIGHT patellar tendon vs LEFT     Sit to stand mechanics with both hands on RIGHT knee    Verbal cueing for weight on heels   Verbal cueing for anterior weight shft   5x  3 sets   EDUCATION as to do this every chair, car, restroom etc    BODY mechanics/ lifting technique at mirror    Verbal cueing for weight on heels   Verbal cueing for bum back and core on    Practice picking up from lower height   Practice using matix leg press as example for getting in and out of car    SINGLE LEG STANCE work at mirror    Verbal cueing to increase core activation    Hold on, set posture, and engage core   GOAL 20 sec x 5 bilaterally 2-3x/day       Assessment/Plan  A: progressing well with body mechanics work with decreased pain anteriorly with functional squats  PLAN: progerss closed chain activities/ strengthening and quad strengthening          Timed:    Manual Therapy:    0     mins  27290;  Therapeutic Exercise:    0     mins  71844;     Neuromuscular Carlos A:    23    mins  08663;    Therapeutic Activity:     15     mins  49009;     Gait Trainin     mins  19924;     Ultrasound:     0     mins  35763;    Electrical Stimulation:    0     mins  54093 ( );  Iontophoresis    0     mins 06712;  Aquatic Therapy    0     mins 33467;  Dry Needling              0     mins    Untimed:  Electrical Stimulation:    0      mins  20866 ( );  Mechanical Traction:    0     mins  70673;     Timed Treatment:   38   mins   Total Treatment:     38   mins  Vanessa Soares, PT  Physical Therapist

## 2021-05-21 ENCOUNTER — TREATMENT (OUTPATIENT)
Dept: PHYSICAL THERAPY | Facility: CLINIC | Age: 76
End: 2021-05-21

## 2021-05-21 DIAGNOSIS — M25.561 ACUTE PAIN OF RIGHT KNEE: ICD-10-CM

## 2021-05-21 DIAGNOSIS — M76.891 TENDINITIS OF RIGHT QUADRICEPS TENDON: Primary | ICD-10-CM

## 2021-05-21 PROCEDURE — 97110 THERAPEUTIC EXERCISES: CPT | Performed by: PHYSICAL THERAPIST

## 2021-05-21 PROCEDURE — 97530 THERAPEUTIC ACTIVITIES: CPT | Performed by: PHYSICAL THERAPIST

## 2021-05-21 NOTE — PROGRESS NOTES
"Physical Therapy Daily Progress Note    Patient: Елена Beach   : 1945  Diagnosis/ICD-10 Code:  Tendinitis of right quadriceps tendon [M76.891]  Referring practitioner: Gerry Estevez MD  Date of Initial Visit: Type: THERAPY  Noted: 2021  Today's Date: 2021  Patient seen for 4 sessions           Subjective   Patient reports she has been working on her SLS but it's easier for her to do it with her shoes on.    Objective   See Exercise, Manual, and Modality Logs for complete treatment.     EXERCISES:  -Recumbent bike 5 min  -Corner cage stretch hamstring and quad (forward lunge), 30 sec each  -6\" Step ups F/L on each leg x20  -LAQ x20 B  -SLR 2x15 B  -Quad sets x20  -Sidelying clamshells 20x B    Assessment/Plan  Patient able to progress to 6\" Step height but has compensation from pulling up with her arms. Worked on forward weight shift prior to ascending and tried to limit UE support to promote better balance. Worked on performing quad set prior to SLR to limit knee extensor lag         Timed:    Manual Therapy:         mins  80183;  Therapeutic Exercise:    31     mins  28664;     Neuromuscular Carlos A:        mins  64623;    Therapeutic Activity:     9     mins  77750;     Gait Training:           mins  42805;     Ultrasound:          mins  92460;    Electrical Stimulation:         mins  18609 ( );  Iontophoresis         mins 25917;  Aquatic Therapy         mins 71514;  Dry Needling                   mins    Untimed:  Electrical Stimulation:         mins  54439 ( );  Mechanical Traction:         mins  99095;     Timed Treatment:   40   mins   Total Treatment:     40   mins  Tawanna Cabrera PT  Physical Therapist  "

## 2021-05-24 ENCOUNTER — TREATMENT (OUTPATIENT)
Dept: PHYSICAL THERAPY | Facility: CLINIC | Age: 76
End: 2021-05-24

## 2021-05-24 DIAGNOSIS — M76.891 TENDINITIS OF RIGHT QUADRICEPS TENDON: Primary | ICD-10-CM

## 2021-05-24 DIAGNOSIS — M25.561 ACUTE PAIN OF RIGHT KNEE: ICD-10-CM

## 2021-05-24 PROCEDURE — 97110 THERAPEUTIC EXERCISES: CPT | Performed by: PHYSICAL THERAPIST

## 2021-05-24 PROCEDURE — DRYNDL PR CUSTOM DRY NEEDLING SELF PAY: Performed by: PHYSICAL THERAPIST

## 2021-05-24 PROCEDURE — 97530 THERAPEUTIC ACTIVITIES: CPT | Performed by: PHYSICAL THERAPIST

## 2021-05-24 NOTE — PROGRESS NOTES
"Physical Therapy Daily Progress Note    Patient: Елена Beach   : 1945  Diagnosis/ICD-10 Code:  Tendinitis of right quadriceps tendon [M76.891]  Referring practitioner: Gerry Estevez MD  Date of Initial Visit: Type: THERAPY  Noted: 2021  Today's Date: 2021  Patient seen for 5 sessions           Subjective   Patient reports that her knee is feeling a little better but she still feels stiff.    Objective   See Exercise, Manual, and Modality Logs for complete treatment.     EXERCISES:  -Recumbent bike 5 min  -Corner cage stretch hamstring and quad (forward lunge), 30 sec each  -6\" Step ups F/L on each leg x20  -SAQ x20 B  -SLR x15 B  -Quad sets x20 *defer  -Sidelying clamshells 20x B    Dry needling   Using threading and direct techniques, obtaining written and verbal consent to treat after discussing benefits and risks. Clean needle technique observed at all times. Precautions utilized for lung fields and neurovascular structures. Patient in supine for needling of right quadratus lumborum, vastus medialis, VMO, and vastus lateralis. Utilized 2 needles and inserted a total of 3-4 times each with each needle. 50 mm needles utilized. Positive twitch response. No adverse response noted. Manual palpation and assessment performed before, during, and after session.    Assessment/Plan   Patient continues to complain of stiffness in her R knee therefore utilized dry needling to try and help alleviate muscle tension that may be pulling on the patellar tendon. Will assess response to needling and determine if it is appropriate to try again. Cueing provided with step ups for better forward weight shift in order to decrease reliance on UE support.            Timed:    Manual Therapy:         mins  46816;  Therapeutic Exercise:    20     mins  10756;     Neuromuscular Carlos A:        mins  50503;    Therapeutic Activity:     8     mins  60314;     Gait Training:           mins  94990;     Ultrasound:          " mins  44441;    Electrical Stimulation:         mins  83516 ( );  Iontophoresis         mins 65428;  Aquatic Therapy         mins 14660;  Dry Needling              12     mins    Untimed:  Electrical Stimulation:         mins  03555 ( );  Mechanical Traction:         mins  12511;     Timed Treatment:   40   mins   Total Treatment:     40   mins  Tawanna Cabrera PT  Physical Therapist

## 2021-06-01 ENCOUNTER — TREATMENT (OUTPATIENT)
Dept: PHYSICAL THERAPY | Facility: CLINIC | Age: 76
End: 2021-06-01

## 2021-06-01 DIAGNOSIS — M25.561 ACUTE PAIN OF RIGHT KNEE: ICD-10-CM

## 2021-06-01 DIAGNOSIS — M76.891 TENDINITIS OF RIGHT QUADRICEPS TENDON: Primary | ICD-10-CM

## 2021-06-01 PROCEDURE — 97110 THERAPEUTIC EXERCISES: CPT | Performed by: PHYSICAL THERAPIST

## 2021-06-01 PROCEDURE — 97140 MANUAL THERAPY 1/> REGIONS: CPT | Performed by: PHYSICAL THERAPIST

## 2021-06-01 NOTE — PROGRESS NOTES
30-Day / 10-Visit Progress Note         Patient: Елена Beach   : 1945  Diagnosis/ICD-10 Code:  Tendinitis of right quadriceps tendon [M76.891]  Referring practitioner: Gerry Estevez MD  Date of Initial Visit: Type: THERAPY  Noted: 2021  Today's Date: 2021  Patient seen for 6 sessions      Subjective:     Clinical Progress: unchanged  Home Program Compliance: Yes  Treatment has included:  therapeutic exercise, manual therapy, therapeutic activity, neuro-muscular retraining , gait training, patient education with home exercise program  and dry needling     Subjective pain still getting up to a 8/10 at times. Dry needling helped for a couple of days, but it is still tight and stiff  Objective     See Exercise, Manual, and Modality Logs for complete treatment.     EXERCISES:  -quad set 15x 5 sec  -SLR x15   -SL hip abd x20  -SL clam, x20  -bridge x20  -hip abd/ER with green band, x20  -ITband stretch 3x20 sec    Manual:  STM to R quad with ITBand release      Assessment & Plan     Assessment  Assessment details: Елена Beach has been seen for 6 physical therapy sessions for R knee and thigh pain.  Treatment has included therapeutic exercise, manual therapy, therapeutic activity, neuro-muscular retraining , patient education with home exercise program  and dry needling . Progress to physical therapy goals is slow. She is still reporting a lot of stiffness with sitting more than a hour and with transitional movements. She has tightness and tenderness along the VL and the ITband that reduced with manual therapy today.  She will benefit from continued skilled physical therapy to address remaining impairments and functional limitations.     Prognosis: good    Goals  Plan Goals:  STGs to be met by 6 weeks  Pt will be independent and compliant with initial home exercise program. (PART MET)   Pt will report R knee pain </= 3/10 to increase ease of stair climbing. (ONGOING)   Pt will report R knee  stiffness improved by 50% with sitting in standard chair. (ONGOING)     LTGs to be met by 12 weeks  Pt will be independent and compliant with advanced home exercise program.  (ONGOING)   Pt will report minimal to no pain with climbing stairs.  (ONGOING)   Pt will score </=33% on KOOS indicating decreased perceived functional disability (ONGOING)            Recommendations: Continue as planned  Timeframe: 6 weeks  Prognosis to achieve goals: good    PT Signature: Cierra Nelson, PT      Based upon review of the patient's progress and continued therapy plan, it is my medical opinion that Елена Beach should continue physical therapy treatment at [facility] PH[facility]  [address]   [place] [address]  [phone].    Signature: __________________________________  Gerry Estevez MD    Timed:  Manual Therapy:    12     mins  69095;  Therapeutic Exercise:    28     mins  92414;     Neuromuscular Carlos A:        mins  65697;    Therapeutic Activity:          mins  73150;     Gait Training:           mins  57543;     Ultrasound:          mins  22321;    Electrical Stimulation:         mins  58623 ( );  Iontophoresis         mins 16018;  Dry Needling                   mins    Untimed:  Electrical Stimulation:         mins  06489 ( );  Mechanical Traction:         mins  00061;     Timed Treatment:   40   mins   Total Treatment:     40   mins

## 2021-06-04 ENCOUNTER — TREATMENT (OUTPATIENT)
Dept: PHYSICAL THERAPY | Facility: CLINIC | Age: 76
End: 2021-06-04

## 2021-06-04 DIAGNOSIS — M76.891 TENDINITIS OF RIGHT QUADRICEPS TENDON: Primary | ICD-10-CM

## 2021-06-04 DIAGNOSIS — M25.561 ACUTE PAIN OF RIGHT KNEE: ICD-10-CM

## 2021-06-04 PROCEDURE — 97110 THERAPEUTIC EXERCISES: CPT | Performed by: PHYSICAL THERAPIST

## 2021-06-04 PROCEDURE — 97140 MANUAL THERAPY 1/> REGIONS: CPT | Performed by: PHYSICAL THERAPIST

## 2021-06-04 NOTE — PROGRESS NOTES
Physical Therapy Daily Progress Note    Patient: Елена Beach   : 1945  Diagnosis/ICD-10 Code:  Tendinitis of right quadriceps tendon [M76.891]  Referring practitioner: Gerry Estevez MD  Date of Initial Visit: Type: THERAPY  Noted: 2021  Today's Date: 2021  Patient seen for 7 sessions           Subjective the knee feels a little better    Objective   See Exercise, Manual, and Modality Logs for complete treatment.     EXERCISES:  -quad set 15x 5 sec B  -SLR x10 B   -hip add iso w.ball x20 5 sec hold  -SL hip abd x20  -SL clam, x20  -bridge x20  -hip abd/ER with green band, x20  -ITband stretch 3x20 sec     Manual:  STM to R quad with ITBand release    Assessment/Plan  Pt has had decreased pain this week with her new stretching, manual techniques, and she got a muscle roller for home. She still has complaint of stiffness and decreased R LE strength.          Timed:    Manual Therapy:    12     mins  69267;  Therapeutic Exercise:    28     mins  29604;     Neuromuscular Carlos A:        mins  36684;    Therapeutic Activity:          mins  66968;     Gait Training:           mins  41678;     Ultrasound:          mins  36046;    Electrical Stimulation:         mins  00298 ( );  Iontophoresis         mins 56587;  Aquatic Therapy         mins 57739;  Dry Needling                   mins    Untimed:  Electrical Stimulation:         mins  27125 ( );  Mechanical Traction:         mins  02584;     Timed Treatment:   40   mins   Total Treatment:     40   mins  Cierra Nelson, PT  Physical Therapist

## 2021-06-14 ENCOUNTER — OFFICE VISIT (OUTPATIENT)
Dept: ORTHOPEDIC SURGERY | Facility: CLINIC | Age: 76
End: 2021-06-14

## 2021-06-14 VITALS — HEIGHT: 62 IN | TEMPERATURE: 97.3 F | BODY MASS INDEX: 19.32 KG/M2 | WEIGHT: 105 LBS

## 2021-06-14 DIAGNOSIS — G89.29 CHRONIC PAIN OF RIGHT KNEE: Primary | ICD-10-CM

## 2021-06-14 DIAGNOSIS — M25.561 CHRONIC PAIN OF RIGHT KNEE: Primary | ICD-10-CM

## 2021-06-14 PROCEDURE — 99212 OFFICE O/P EST SF 10 MIN: CPT | Performed by: ORTHOPAEDIC SURGERY

## 2021-06-14 NOTE — PROGRESS NOTES
Chief complaint: Follow-up regarding right knee pain    Ms. Beach follows up today for her right knee.  She has been going to therapy and it seems to be helping.  Her pain is minimal at this point.  She quit taking the meloxicam because she felt she no longer needed it.    Her incision is healed.  She has mild tenderness along the incision itself.  She has excellent motion including full extension and flexion to about 120 degrees.  No pain with hyperflexion.  Negative patellar grind test.    Assessment: Resolving quadriceps tendinitis status post total knee arthroplasty    Plan: I encouraged her to continue the therapy and she can transition to a home exercise program whenever she feels comfortable.  I gave her a sample of Pennsaid to try for her mild residual tenderness along the incision.  She can follow-up with me as needed.

## 2021-06-23 ENCOUNTER — HOSPITAL ENCOUNTER (OUTPATIENT)
Dept: MAMMOGRAPHY | Facility: HOSPITAL | Age: 76
Discharge: HOME | End: 2021-06-23
Admitting: NURSE PRACTITIONER

## 2021-06-23 DIAGNOSIS — Z12.39 SCREENING BREAST EXAMINATION: ICD-10-CM

## 2021-06-23 PROCEDURE — 77067 SCR MAMMO BI INCL CAD: CPT

## 2021-06-23 PROCEDURE — 77063 BREAST TOMOSYNTHESIS BI: CPT

## 2021-06-24 ENCOUNTER — TREATMENT (OUTPATIENT)
Dept: PHYSICAL THERAPY | Facility: CLINIC | Age: 76
End: 2021-06-24

## 2021-06-24 DIAGNOSIS — M76.891 TENDINITIS OF RIGHT QUADRICEPS TENDON: Primary | ICD-10-CM

## 2021-06-24 DIAGNOSIS — M25.561 ACUTE PAIN OF RIGHT KNEE: ICD-10-CM

## 2021-06-24 PROCEDURE — 97140 MANUAL THERAPY 1/> REGIONS: CPT | Performed by: PHYSICAL THERAPIST

## 2021-06-24 PROCEDURE — 97110 THERAPEUTIC EXERCISES: CPT | Performed by: PHYSICAL THERAPIST

## 2021-06-24 NOTE — PROGRESS NOTES
Physical Therapy Daily Progress Note    Patient: Елена Beach   : 1945  Diagnosis/ICD-10 Code:  Tendinitis of right quadriceps tendon [M76.891]  Referring practitioner: Gerry Estevez MD  Date of Initial Visit: Type: THERAPY  Noted: 2021  Today's Date: 2021  Patient seen for 8 sessions           Subjective I have been doing better. [place] and my hips and knee are sore today. Walked over 3 miles. She wore her back brace and took seated rest breaks.     Objective     EXERCISES:  -quad set 15x 5 sec B  -SLR 2x10 B   -hip add iso w/ball x20 5 sec hold  -SL hip abd x20  -SL clam, x20  -bridge x20  -hip abd/ER with green band, x20  -ITband stretch 3x20 sec     Manual:  STM to R quad with ITBand release      Assessment/Plan  Pt has less pain overall. She has been able to walk for longer distances and is not having sharp pain. She still needs some cues for her exercises, holding a quad set as performs SLR and SL hip abd. Probable DC next visit         Timed:    Manual Therapy:    12     mins  35513;  Therapeutic Exercise:    30     mins  36842;     Neuromuscular Carlos A:        mins  10426;    Therapeutic Activity:          mins  70730;     Gait Training:           mins  31294;     Ultrasound:          mins  06627;    Electrical Stimulation:         mins  34817 ( );  Iontophoresis         mins 70986;  Aquatic Therapy         mins 61545;  Dry Needling                   mins    Untimed:  Electrical Stimulation:         mins  76689 ( );  Mechanical Traction:         mins  32535;     Timed Treatment:   42   mins   Total Treatment:     42   mins  Cierra Nelson, PT  Physical Therapist

## 2021-07-01 ENCOUNTER — TREATMENT (OUTPATIENT)
Dept: PHYSICAL THERAPY | Facility: CLINIC | Age: 76
End: 2021-07-01

## 2021-07-01 DIAGNOSIS — M76.891 TENDINITIS OF RIGHT QUADRICEPS TENDON: Primary | ICD-10-CM

## 2021-07-01 DIAGNOSIS — M25.561 ACUTE PAIN OF RIGHT KNEE: ICD-10-CM

## 2021-07-01 PROCEDURE — 97110 THERAPEUTIC EXERCISES: CPT | Performed by: PHYSICAL THERAPIST

## 2021-07-01 PROCEDURE — 97530 THERAPEUTIC ACTIVITIES: CPT | Performed by: PHYSICAL THERAPIST

## 2021-07-01 NOTE — PROGRESS NOTES
30-Day / 10-Visit Progress Note/Discharge Summary         Patient: Елена Beach   : 1945  Diagnosis/ICD-10 Code:  Tendinitis of right quadriceps tendon [M76.891]  Referring practitioner: Gerry Estevez MD  Date of Initial Visit: Type: THERAPY  Noted: 2021  Today's Date: 2021  Patient seen for 9 sessions      Subjective:     Clinical Progress: improved  Home Program Compliance: Yes  Treatment has included:  therapeutic exercise, manual therapy, patient education with home exercise program  and dry needling     Subjective   Objective     See Exercise, Manual, and Modality Logs for complete treatment.     EXERCISES:  -quad set 15x 5 sec B  -SLR 2x10 B   -hip add iso w/ball x20 5 sec hold  -SL hip abd x20  -SL clam, x20  -bridge x20  -hip abd/ER with green band, x20  -ITband stretch 3x20 sec     Discussed HEP going forward (strength every other day, stretching daily). Pt has an upright at her condo. Discussed set up of bike and duration. Pt with a history of fibromyalgia so may just have some chronic underlying inflammation that makes her continue to have stiffness in her R TKA.     Functional Outcome Score:   Knee Outcome Survey=51/80 or 63.75% (36.25% disability)    Assessment & Plan     Assessment  Assessment details:  Елена Beach was seen for 9 physical therapy sessions for R knee pain.  Treatment included therapeutic exercise, manual therapy, therapeutic activity, patient education with home exercise program  and dry needling . Progress to physical therapy goals was fair.  She continues with c/o stiffness of the R knee, but no longer has the lateral knee pain that she came to therapy for. She has R>L groin pain and decreased hip mobility. Her strength of her knee has improved. She was discharged to an independent HEP and provided patient education to self-manage condition.       Goals  Plan Goals: STGs to be met by 2 weeks  Pt will be independent and compliant with initial home exercise  program. (MET)   Pt will report R knee pain </= 3/10 to increase ease of stair climbing. (MET)   Pt will report R knee stiffness improved by 50% with sitting in standard chair. (PART MET)      LTGs to be met by 4 weeks  Pt will be independent and compliant with advanced home exercise program. (MET)   Pt will report minimal to no pain with climbing stairs. (PART MET)   Pt will score </=33% on KOOS indicating decreased perceived functional disability. (NOT MET)            Recommendations: Discharge  Timeframe: today  Prognosis to achieve goals: good    PT Signature: Cierra Nelson, PT      Based upon review of the patient's progress and continued therapy plan, it is my medical opinion that Елена Beach should continue physical therapy treatment at [facility] PHYSICAL THERAP[facility]  [address] [place] [address] [address] [place] [address]  [phone].    Signature: __________________________________  Gerry Estevez MD    Timed:  Manual Therapy:         mins  79354;  Therapeutic Exercise:    30     mins  71974;     Neuromuscular Carlos A:        mins  46114;    Therapeutic Activity:     8     mins  33985;     Gait Training:           mins  20691;     Ultrasound:          mins  91508;    Electrical Stimulation:         mins  95688 ( );  Iontophoresis         mins 54507;  Dry Needling                   mins    Untimed:  Electrical Stimulation:         mins  62803 ( );  Mechanical Traction:         mins  88995;     Timed Treatment:   38   mins   Total Treatment:     38   mins

## 2021-07-23 NOTE — PROGRESS NOTES
"Subjective   Елена Beach is a 71 y.o. female.     History of Present Illness   Well Adult Physical: Patient here for a comprehensive physical exam.The patient reports no problems  Do you take any herbs or supplements that were not prescribed by a doctor? no Are you taking calcium supplements? no Are you taking aspirin daily? no      The following portions of the patient's history were reviewed and updated as appropriate: allergies, current medications, past social history and problem list.    Review of Systems   All other systems reviewed and are negative.      Objective   Visit Vitals   • /62 (BP Location: Left arm, Patient Position: Sitting)   • Pulse 95   • Temp 98.3 °F (36.8 °C)   • Ht 62\" (157.5 cm)   • Wt 112 lb (50.8 kg)   • SpO2 94%   • BMI 20.49 kg/m2     Physical Exam   Constitutional: She is oriented to person, place, and time. She appears well-developed and well-nourished.   Neck: No thyromegaly present.   Cardiovascular: Normal rate, regular rhythm and normal heart sounds.  Exam reveals no gallop.    No murmur heard.  Pulmonary/Chest: Effort normal and breath sounds normal. She has no wheezes. She has no rales. She exhibits no tenderness. Right breast exhibits no mass, no nipple discharge and no skin change. Left breast exhibits no mass, no nipple discharge and no skin change.   Abdominal: There is no tenderness.   Genitourinary: Vagina normal and uterus normal. Pelvic exam was performed with patient supine. There is no rash or lesion on the right labia. There is no rash or lesion on the left labia. Uterus is not enlarged and not tender. Cervix exhibits no motion tenderness, no discharge and no friability. Right adnexum displays no mass, no tenderness and no fullness. Left adnexum displays no mass, no tenderness and no fullness. No erythema, tenderness or bleeding in the vagina. No vaginal discharge found.   Lymphadenopathy:        Right: No inguinal adenopathy present.        Left: No " inguinal adenopathy present.   Neurological: She is alert and oriented to person, place, and time.   Skin: Skin is warm. No rash noted.   Psychiatric: She has a normal mood and affect. Her behavior is normal. Judgment and thought content normal.   Vitals reviewed.      Assessment/Plan   Problem List Items Addressed This Visit        Other    Pap test, as part of routine gynecological examination - Primary        rto prn   pt can come in April only for labs      You can access the FollowMyHealth Patient Portal offered by [facility] by registering at the following website: [email or web address]. By joining [employer]’s FollowMyHealth portal, you will also be able to view your health information using other applications (apps) compatible with our system.

## 2021-10-12 ENCOUNTER — IMMUNIZATION (OUTPATIENT)
Dept: VACCINE CLINIC | Facility: HOSPITAL | Age: 76
End: 2021-10-12

## 2021-10-12 PROCEDURE — 91300 HC SARSCOV02 VAC 30MCG/0.3ML IM: CPT | Performed by: INTERNAL MEDICINE

## 2021-10-12 PROCEDURE — 0004A ADM SARSCOV2 30MCG/0.3ML BOOSTER: CPT | Performed by: INTERNAL MEDICINE

## 2021-10-28 RX ORDER — OMEPRAZOLE 40 MG/1
CAPSULE, DELAYED RELEASE ORAL
Qty: 90 CAPSULE | Refills: 1 | Status: SHIPPED | OUTPATIENT
Start: 2021-10-28 | End: 2022-04-15

## 2021-10-29 RX ORDER — AZELASTINE 1 MG/ML
SPRAY, METERED NASAL
Qty: 3 EACH | Refills: 1 | Status: SHIPPED | OUTPATIENT
Start: 2021-10-29 | End: 2022-12-29

## 2021-12-22 ENCOUNTER — TELEPHONE (OUTPATIENT)
Dept: FAMILY MEDICINE CLINIC | Facility: CLINIC | Age: 76
End: 2021-12-22

## 2021-12-22 NOTE — TELEPHONE ENCOUNTER
Caller: Елена Beach    Relationship: Self    Best call back [phone]      Who are you requesting to speak with (clinical staff, provider,  specific staff member): CLINICAL STAFF     What was the call regarding: PATIENT WANTING TO KNOW HOW TO GET OFF MEDICATION citalopram (CeleXA) 10 MG tablet PATIENT STATES DOES NOT NEED MEDICATION ANYMORE SHES DOING FINE PLEASE CALL AND ADVISE     Do you require a callback: YES

## 2022-01-19 ENCOUNTER — TELEMEDICINE (OUTPATIENT)
Dept: FAMILY MEDICINE CLINIC | Facility: CLINIC | Age: 77
End: 2022-01-19

## 2022-01-19 ENCOUNTER — TELEMEDICINE (OUTPATIENT)
Dept: FAMILY MEDICINE CLINIC | Facility: TELEHEALTH | Age: 77
End: 2022-01-19

## 2022-01-19 DIAGNOSIS — J06.9 UPPER RESPIRATORY TRACT INFECTION, UNSPECIFIED TYPE: Primary | ICD-10-CM

## 2022-01-19 DIAGNOSIS — R05.9 COUGH: ICD-10-CM

## 2022-01-19 DIAGNOSIS — Z20.822 SUSPECTED COVID-19 VIRUS INFECTION: Primary | ICD-10-CM

## 2022-01-19 LAB
EXPIRATION DATE: NORMAL
FLUAV AG NPH QL: NEGATIVE
FLUBV AG NPH QL: NEGATIVE
INTERNAL CONTROL: NORMAL
Lab: NORMAL

## 2022-01-19 PROCEDURE — 87804 INFLUENZA ASSAY W/OPTIC: CPT

## 2022-01-19 PROCEDURE — 99213 OFFICE O/P EST LOW 20 MIN: CPT

## 2022-01-19 PROCEDURE — 99212 OFFICE O/P EST SF 10 MIN: CPT | Performed by: NURSE PRACTITIONER

## 2022-01-19 RX ORDER — ALBUTEROL SULFATE 90 UG/1
2 AEROSOL, METERED RESPIRATORY (INHALATION) EVERY 4 HOURS PRN
COMMUNITY
Start: 2021-12-28 | End: 2022-01-19

## 2022-01-19 RX ORDER — AZITHROMYCIN 250 MG/1
TABLET, FILM COATED ORAL
Qty: 6 TABLET | Refills: 0 | Status: SHIPPED | OUTPATIENT
Start: 2022-01-19 | End: 2022-01-31

## 2022-01-19 NOTE — PROGRESS NOTES
Chief Complaint  Cough (chest feels tight, coughing up mucus, still have taste and smell.) and Nausea (started today.)    Subjective          Елена Beach presents to [facility]  History of Present Illness     This was an audio and video enabled telemedicine encounter.  Patient consented to an telemedicine visit.  Complete through video was able to see the patient the patient was able to see myself through the entire visit.  Patient present in her car in [place] myself present at [address]    Patient here with complaints of cough, mucus, sinus pressure and congestion for the past 6 days.  Patient said it started out 6 days ago with a dry cough.  She was feeling overall not well and fatigued.  Progressed to cough with mucus.  Also rhinorrhea, congestion, sinus pressure.  Patient today had a little bit of nausea.  However she was able to drink some tea and that helped resolve the nausea.  Patient has also had slightly low appetite.  No fever, chills, muscle aches.  No loss of taste or smell.  No known sick contacts.  Patient does have COPD and at baseline does have some shortness of breath.  Patient is on Trelegy.  However patient says that her shortness of breath is not any worse with this cough.  No wheezing.  Patient has been able to sleep fine even with the cough and has been using Vicks VapoRub.  Patient has had no known sick contacts and is fully vaccinated for COVID.    Objective   Vital Signs: No vitals-telemedicine  There were no vitals taken for this visit.    Physical Exam  Constitutional:       General: She is not in acute distress.  Pulmonary:      Effort: Pulmonary effort is normal. No respiratory distress.   Neurological:      Mental Status: She is alert.   Psychiatric:         Mood and Affect: Mood normal.        Result Review :{Labs  Result Review  Imaging  Med Tab  Media  Procedures :23}   The following data was reviewed by: BEATRIS Basilio  on 01/19/2022:  Influenza A&B    Common Labsle 1/19/22   Rapid Influenza A Ag Negative   Rapid Influenza B Ag Negative                     Assessment and Plan    Diagnoses and all orders for this visit:    1. Upper respiratory tract infection, unspecified type (Primary)  -     azithromycin (Zithromax Z-Luis Angel) 250 MG tablet; Take 2 tablets by mouth on day 1, then 1 tablet daily on days 2-5  Dispense: 6 tablet; Refill: 0    2. Cough  -     COVID-19,[facility] ROUTINE, NP/OP SWAB IN TRANSPORT MEDIA OR ESWAB 72 HR TAT - Swab, Oropharynx; Future  -     POCT Influenza A/B  -     COVID-19,[facility] ROUTINE, NP/OP SWAB IN TRANSPORT MEDIA OR ESWAB 72 HR TAT - Swab, Oropharynx      Patient presents to this visit with upper respiratory infection.  Testing for COVID today.  Flu test negative.  Counseled patient to quarantine at this time while being tested for COVID.  Increase fluid intake, vitamin C, and continue with over-the-counter medication.  Patient is in no respiratory distress, no worsening shortness of breath from baseline, no wheezing.  However since she is having a productive cough and has a history of COPD with pneumonia in the past proceeding to treat with azithromycin today.  Counseled patient if she does develop worsening shortness of breath, chest pain, high fever proceed to ER.  If symptoms do not improve or wheezing does develop return to clinic.  We will follow-up with COVID test has resulted.      Follow Up   Return if symptoms worsen or fail to improve.  Patient was given instructions and counseling regarding her condition or for health maintenance advice. Please see specific information pulled into the AVS if appropriate.     Medical assistant and I wore mask and eyewear protection during entire encounter.  Patient wore mask.      Electronically signed by BEATRIS Basilio, 01/19/22, 3:47 PM EST.

## 2022-01-19 NOTE — PROGRESS NOTES
You have chosen to receive care through a telehealth visit.  Do you consent to use a video/audio connection for your medical care today? Yes     CHIEF COMPLAINT  No chief complaint on file.        HPI  Елена Beach is a 76 y.o. female  presents with complaint of 4 day history of cough with clear discharge, feels hot inside, voice is hoarse, nasal congestion.  Wants to be tested for COVID-19 to rule out.  Has chronic bronchitis so always has cough and congestion.     Review of Systems   Constitutional: Negative for fever.   HENT: Positive for congestion and voice change.    Respiratory: Positive for cough.    All other systems reviewed and are negative.      Past Medical History:   Diagnosis Date   • Anemia    • Arthritis    • Asthma    • Back pain    • Bursitis    • COPD (chronic obstructive pulmonary disease) (CMS/[facility])    • Fibromyalgia    • Fibromyositis    • GERD (gastroesophageal reflux disease)    • Hiatal hernia    • History of pneumonia    • Hyperlipidemia    • Knee pain    • Osteoarthritis    • Osteoporosis    • PONV (postoperative nausea and vomiting)    • Scoliosis        Family History   Problem Relation Age of Onset   • Heart failure Mother    • Hypertension Mother    • Cancer Mother         breast   • Cancer Father         prostate   • Malig Hyperthermia Neg Hx        Social History     Socioeconomic History   • Marital status: Single   • Number of children: 1   Tobacco Use   • Smoking status: Former Smoker     Packs/day: 1.00     Years: 40.00     Pack years: 40.00     Types: Cigarettes     Quit date: 2016     Years since quittin.2   • Smokeless tobacco: Never Used   Vaping Use   • Vaping Use: Never used   Substance and Sexual Activity   • Alcohol use: Yes     Alcohol/week: 1.0 standard drink     Types: 1 Shots of liquor per week     Comment: occ   • Drug use: No   • Sexual activity: Not Currently     Partners: Male     Birth control/protection: Other         There were no  vitals taken for this visit.    PHYSICAL EXAM  Physical Exam   Constitutional: She is oriented to person, place, and time. She appears well-developed and well-nourished. She does not have a sickly appearance. She does not appear ill.   HENT:   Head: Normocephalic and atraumatic.   Pulmonary/Chest: Effort normal.  No respiratory distress.  Neurological: She is alert and oriented to person, place, and time.         Diagnoses and all orders for this visit:    1. Suspected COVID-19 virus infection (Primary)  -     COVID-19,[facility] ROUTINE, NP/OP SWAB IN TRANSPORT MEDIA OR ESWAB 72 HR TAT - Swab, Nasopharynx; Future    --COVID-19 test to rule out infection  --quarantine and symptomatic treatment advised      FOLLOW-UP  As discussed during visit with PCP/[facility] if no improvement or Urgent Care/Emergency Department if worsening of symptoms    Patient verbalizes understanding of medication dosage, comfort measures, instructions for treatment and follow-up.    Jeniffer Linda, BEATRIS  01/19/2022  08:17 EST    This visit was performed via Telehealth.  This patient has been instructed to follow-up with their primary care provider if their symptoms worsen or the treatment provided does not resolve their illness.

## 2022-01-19 NOTE — PATIENT INSTRUCTIONS
10 Things You Can Do to Manage Your COVID-19 Symptoms at Home  If you have possible or confirmed COVID-19:  1. Stay home except to get medical care.  2. Monitor your symptoms carefully. If your symptoms get worse, call your healthcare provider immediately.  3. Get rest and stay hydrated.  4. If you have a medical appointment, call the healthcare provider ahead of time and tell them that you have or may have COVID-19.  5. For medical emergencies, call 911 and notify the dispatch personnel that you have or may have COVID-19.  6. Cover your cough and sneezes with a tissue or use the inside of your elbow.  7. Wash your hands often with soap and water for at least 20 seconds or clean your hands with an alcohol-based hand  that contains at least 60% alcohol.  8. As much as possible, stay in a specific room and away from other people in your home. Also, you should use a separate bathroom, if available. If you need to be around other people in or outside of the home, wear a mask.  9. Avoid sharing personal items with other people in your household, like dishes, towels, and bedding.  10. Clean all surfaces that are touched often, like counters, tabletops, and doorknobs. Use household cleaning sprays or wipes according to the label instructions.  cdc.gov/coronavirus  07/16/2021  This information is not intended to replace advice given to you by your health care provider. Make sure you discuss any questions you have with your health care provider.  Document Revised: 08/04/2021 Document Reviewed: 08/04/2021  [employer] Patient Education © 2021 [employer]    How to Quarantine at Home  Information for Patients and Families    These instructions are for people with confirmed or suspected COVID-19 who do not need to be hospitalized and those with confirmed COVID-19 who were hospitalized and discharged to care for themselves at home.    If you were tested through the Health Department  The Health Department will monitor  your wellbeing.  If it is determined that you do not need to be hospitalized and can be isolated at home, you will be monitored by staff from your local or state health department.     If you were tested through a Commercial Lab  You will need to monitor yourself and report changes in your symptoms to your doctor.  See the section below called Monitor Your Symptoms.    Follow these steps until a healthcare provider or local or state health department says you can return to your normal activities.    Stay home except to get medical care  • Restrict activities outside your home, except for getting medical care.   • Do not go to work, school, or public areas.   • Avoid using public transportation, ride-sharing, or taxis.    Separate yourself from other people and animals in your home  People  As much as possible, you should stay in a specific room and away from other people in your home. Also, you should use a separate bathroom, if available.    Animals  You should restrict contact with pets and other animals while you are sick with COVID-19, just like you would around other people. When possible, have another member of your household care for your animals while you are sick. If you are sick with COVID-19, avoid contact with your pet, including petting, snuggling, being kissed or licked, and sharing food. If you must care for your pet or be around animals while you are sick, wash your hands before and after you interact with pets and wear a facemask. See COVID-19 and Animals for more information.    Call ahead before visiting your doctor  If you have a medical appointment, call the healthcare provider and tell them that you have or may have COVID-19. This information will help the healthcare provider’s office take steps to keep other people from getting infected or exposed.    Wear a facemask  You should wear a facemask when you are around other people (e.g., sharing a room or vehicle) or pets and before you enter a  healthcare provider’s office.     If you are not able to wear a facemask (for example, because it causes trouble breathing), then people who live with you should not stay in the same room with you, or they should wear a facemask if they enter your room.    Cover your coughs and sneezes  • Cover your mouth and nose with a tissue when you cough or sneeze.   • Throw used tissues in a lined trash can.   • Immediately wash your hands with soap and water for at least 20 seconds or, if soap and water are not available, clean your hands with an alcohol-based hand  that contains at least 60% alcohol.    Clean your hands often  • Wash your hands often with soap and water for at least 20 seconds, especially after blowing your nose, coughing, or sneezing; going to the bathroom; and before eating or preparing food.     • If soap and water are not readily available, use an alcohol-based hand  with at least 60% alcohol, covering all surfaces of your hands and rubbing them together until they feel dry.    • Soap and water are the best option if hands are visibly dirty. Avoid touching your eyes, nose, and mouth with unwashed hands.    Avoid sharing personal household items  • You should not share dishes, drinking glasses, cups, eating utensils, towels, or bedding with other people or pets in your home.   • After using these items, they should be washed thoroughly with soap and water.    Clean all “high-touch” surfaces everyday  • High touch surfaces include counters, tabletops, doorknobs, bathroom fixtures, toilets, phones, keyboards, tablets, and bedside tables.   • Also, clean any surfaces that may have blood, stool, or body fluids on them.   • Use a household cleaning spray or wipe, according to the label instructions. Labels contain instructions for safe and effective use of the cleaning product, including precautions you should take when applying the product, such as wearing gloves and making sure you have  good ventilation during use of the product.    Monitor your symptoms  • Seek prompt medical attention if your illness is worsening (e.g., difficulty breathing).   • Before seeking care, call your healthcare provider and tell them that you have, or are being evaluated for, COVID-19.   • Put on a facemask before you enter the facility.     • These steps will help the healthcare provider’s office to keep other people in the office or waiting room from getting infected or exposed.   • Persons who are placed under active monitoring or facilitated self-monitoring should follow instructions provided by their local health department or occupational health professionals, as appropriate.  • If you have a medical emergency and need to call 911, notify the dispatch personnel that you have, or are being evaluated for COVID-19. If possible, put on a facemask before emergency medical services arrive.    Discontinuing home isolation  Patients with confirmed COVID-19 should remain under home isolation precautions until the risk of secondary transmission to others is thought to be low. The decision to discontinue home isolation precautions should be made on a case-by-case basis, in consultation with healthcare providers and state and local health departments.    The below content are for household members, intimate partners, and caregivers of a patient with symptomatic laboratory-confirmed COVID-19 or a patient under investigation:    Household members, intimate partners, and caregivers may have close contact with a person with symptomatic, laboratory-confirmed COVID-19 or a person under investigation.     Close contacts should monitor their health; they should call their healthcare provider right away if they develop symptoms suggestive of COVID-19 (e.g., fever, cough, shortness of breath)     Close contacts should also follow these recommendations:  • Make sure that you understand and can help the patient follow their healthcare  provider’s instructions for medication(s) and care. You should help the patient with basic needs in the home and provide support for getting groceries, prescriptions, and other personal needs.  • Monitor the patient’s symptoms. If the patient is getting sicker, call his or her healthcare provider and tell them that the patient has laboratory-confirmed COVID-19. This will help the healthcare provider’s office take steps to keep other people in the office or waiting room from getting infected. Ask the healthcare provider to call the local or [facility] health department for additional guidance. If the patient has a medical emergency and you need to call 911, notify the dispatch personnel that the patient has, or is being evaluated for COVID-19.  • Household members should stay in another room or be  from the patient as much as possible. Household members should use a separate bedroom and bathroom, if available.  • Prohibit visitors who do not have an essential need to be in the home.  • Household members should care for any pets in the home. Do not handle pets or other animals while sick.  For more information, see COVID-19 and Animals.  • Make sure that shared spaces in the home have good air flow, such as by an air conditioner or an opened window, weather permitting.  • Perform hand hygiene frequently. Wash your hands often with soap and water for at least 20 seconds or use an alcohol-based hand  that contains 60 to 95% alcohol, covering all surfaces of your hands and rubbing them together until they feel dry. Soap and water should be used preferentially if hands are visibly dirty.  • Avoid touching your eyes, nose, and mouth with unwashed hands.  • The patient should wear a facemask when you are around other people. If the patient is not able to wear a facemask (for example, because it causes trouble breathing), you, as the caregiver, should wear a mask when you are in the same room as the  patient.  • Wear a disposable facemask and gloves when you touch or have contact with the patient’s blood, stool, or body fluids, such as saliva, sputum, nasal mucus, vomit, or urine.   o Throw out disposable facemasks and gloves after using them. Do not reuse.  o When removing personal protective equipment, first remove and dispose of gloves. Then, immediately clean your hands with soap and water or alcohol-based hand . Next, remove and dispose of facemask, and immediately clean your hands again with soap and water or alcohol-based hand .  • Avoid sharing household items with the patient. You should not share dishes, drinking glasses, cups, eating utensils, towels, bedding, or other items. After the patient uses these items, you should wash them thoroughly (see below “Wash laundry thoroughly”).  • Clean all “high-touch” surfaces, such as counters, tabletops, doorknobs, bathroom fixtures, toilets, phones, keyboards, tablets, and bedside tables, every day. Also, clean any surfaces that may have blood, stool, or body fluids on them.   o Use a household cleaning spray or wipe, according to the label instructions. Labels contain instructions for safe and effective use of the cleaning product including precautions you should take when applying the product, such as wearing gloves and making sure you have good ventilation during use of the product.  • Wash laundry thoroughly.   o Immediately remove and wash clothes or bedding that have blood, stool, or body fluids on them.  o Wear disposable gloves while handling soiled items and keep soiled items away from your body. Clean your hands (with soap and water or an alcohol-based hand ) immediately after removing your gloves.  o Read and follow directions on labels of laundry or clothing items and detergent. In general, using a normal laundry detergent according to washing machine instructions and dry thoroughly using the warmest temperatures  recommended on the clothing label.  • Place all used disposable gloves, facemasks, and other contaminated items in a lined container before disposing of them with other household waste. Clean your hands (with soap and water or an alcohol-based hand ) immediately after handling these items. Soap and water should be used preferentially if hands are visibly dirty.  • Discuss any additional questions with your state or local health department or healthcare provider.    Adapted from information provided by the Centers for Disease Control and Prevention.  For more information, visit https://www.cdc.gov/coronavirus/2019-ncov/hcp/guidance-prevent-spread.html

## 2022-01-20 LAB
LABCORP SARS-COV-2, NAA 2 DAY TAT: NORMAL
SARS-COV-2 RNA RESP QL NAA+PROBE: DETECTED

## 2022-01-31 ENCOUNTER — OFFICE VISIT (OUTPATIENT)
Dept: FAMILY MEDICINE CLINIC | Facility: CLINIC | Age: 77
End: 2022-01-31

## 2022-01-31 VITALS
DIASTOLIC BLOOD PRESSURE: 70 MMHG | HEIGHT: 62 IN | WEIGHT: 114.4 LBS | BODY MASS INDEX: 21.05 KG/M2 | TEMPERATURE: 97.4 F | SYSTOLIC BLOOD PRESSURE: 118 MMHG | OXYGEN SATURATION: 98 % | HEART RATE: 78 BPM

## 2022-01-31 DIAGNOSIS — E78.5 HYPERLIPIDEMIA, UNSPECIFIED HYPERLIPIDEMIA TYPE: ICD-10-CM

## 2022-01-31 DIAGNOSIS — Z00.00 MEDICARE ANNUAL WELLNESS VISIT, SUBSEQUENT: Primary | ICD-10-CM

## 2022-01-31 DIAGNOSIS — Z13.0 SCREENING FOR IRON DEFICIENCY ANEMIA: ICD-10-CM

## 2022-01-31 DIAGNOSIS — M85.80 OSTEOPENIA, UNSPECIFIED LOCATION: ICD-10-CM

## 2022-01-31 DIAGNOSIS — R93.6 ABNORMAL FINDINGS ON DIAGNOSTIC IMAGING OF LIMBS: ICD-10-CM

## 2022-01-31 DIAGNOSIS — Z13.1 SCREENING FOR DIABETES MELLITUS: ICD-10-CM

## 2022-01-31 PROCEDURE — G0439 PPPS, SUBSEQ VISIT: HCPCS | Performed by: NURSE PRACTITIONER

## 2022-01-31 PROCEDURE — 1170F FXNL STATUS ASSESSED: CPT | Performed by: NURSE PRACTITIONER

## 2022-01-31 PROCEDURE — 1160F RVW MEDS BY RX/DR IN RCRD: CPT | Performed by: NURSE PRACTITIONER

## 2022-01-31 NOTE — PROGRESS NOTES
The ABCs of the Annual Wellness Visit  Subsequent Medicare Wellness Visit    Chief Complaint   Patient presents with   • Medicare Wellness-subsequent     Patient is fasting to have her labs drawn       Subjective    History of Present Illness:  Елена Beach is a 76 y.o. female who presents for a Subsequent Medicare Wellness Visit.    The following portions of the patient's history were reviewed and   updated as appropriate: allergies, current medications, past family history, past medical history, past social history, past surgical history and problem list.    Compared to one year ago, the patient feels her physical   health is the same.    Compared to one year ago, the patient feels her mental   health is the same.    Recent Hospitalizations:  She was not admitted to the hospital during the last year.       Current Medical Providers:  Patient Care Team:  Caleb Velez APRN as PCP - General (Family Medicine)  Izabel Villa MD as Consulting Physician (Pulmonary Disease)    Outpatient Medications Prior to Visit   Medication Sig Dispense Refill   • atorvastatin (LIPITOR) 10 MG tablet Take 1 tablet by mouth nightly 90 tablet 3   • azelastine (ASTELIN) 0.1 % nasal spray Use 1 to 2 sprays in each nostril twice daily 3 each 1   • Calcium Citrate-Vitamin D (CALCIUM CITRATE + D PO) Take 1 tablet by mouth Daily.     • Ferrous Gluconate (IRON 27 PO) Take 1 tablet by mouth Every Other Day.     • glucosamine sulfate 500 MG capsule capsule Take  by mouth 3 (Three) Times a Day With Meals.     • Multiple Vitamins-Minerals (MULTIVITAMIN ADULT PO) Take 1 tablet by mouth Daily.     • omeprazole (priLOSEC) 40 MG capsule TAKE 1 CAPSULE EVERY MORNING 90 capsule 1   • probiotic (CULTURELLE) capsule capsule Take  by mouth Daily.     • Trelegy Ellipta 100-62.5-25 MCG/INH aerosol powder       • azithromycin (Zithromax Z-Luis Angel) 250 MG tablet Take 2 tablets by mouth on day 1, then 1 tablet daily on days 2-5 6 tablet 0   • cetirizine  "(zyrTEC) 10 MG tablet Take 10 mg by mouth Daily.     • montelukast (SINGULAIR) 10 MG tablet Take 10 mg by mouth Every Night.     • NON FORMULARY 1 capsule 2 (Two) Times a Day. instaflex       No facility-administered medications prior to visit.       No opioid medication identified on active medication list. I have reviewed chart for other potential  high risk medication/s and harmful drug interactions in the elderly.          Aspirin is not on active medication list.  Aspirin use is not indicated based on review of current medical condition/s. Risk of harm outweighs potential benefits.  .    Patient Active Problem List   Diagnosis   • Fibromyalgia   • Arthritis   • Hiatal hernia   • Depression   • Asthma   • Hyperlipidemia   • Iron deficiency anemia   • Chronic obstructive pulmonary disease (HCC)   • Chronic fatigue   • Snoring   • Dependence on nicotine from cigarettes   • Insomnia   • Osteopenia   • Screening for iron deficiency anemia   • Shortness of breath   • Family history of hypothyroidism   • Diarrhea   • Lymphadenopathy     Advance Care Planning  Advance Directive is on file.  ACP discussion was held with the patient during this visit. Patient has an advance directive in EMR which is still valid.           Objective    Vitals:    22 1018   BP: 118/70   Pulse: 78   Temp: 97.4 °F (36.3 °C)   SpO2: 98%   Weight: 51.9 kg (114 lb 6.4 oz)   Height: 157.5 cm (62.01\")   PainSc: 0-No pain     BMI Readings from Last 1 Encounters:   22 20.92 kg/m²   BMI is within normal parameters. No follow-up required.    Does the patient have evidence of cognitive impairment? No    Physical Exam            HEALTH RISK ASSESSMENT    Smoking Status:  Social History     Tobacco Use   Smoking Status Former Smoker   • Packs/day: 1.00   • Years: 40.00   • Pack years: 40.00   • Types: Cigarettes   • Quit date: 2016   • Years since quittin.2   Smokeless Tobacco Never Used     Alcohol Consumption:  Social History "     Substance and Sexual Activity   Alcohol Use Yes   • Alcohol/week: 1.0 standard drink   • Types: 1 Shots of liquor per week    Comment: occ     Fall Risk Screen:    STEADI Fall Risk Assessment has not been completed.    Depression Screening:  PHQ-2/PHQ-9 Depression Screening 1/31/2022   Little interest or pleasure in doing things 0   Feeling down, depressed, or hopeless 0   Trouble falling or staying asleep, or sleeping too much -   Feeling tired or having little energy -   Poor appetite or overeating -   Feeling bad about yourself - or that you are a failure or have let yourself or your family down -   Trouble concentrating on things, such as reading the newspaper or watching television -   Moving or speaking so slowly that other people could have noticed. Or the opposite - being so fidgety or restless that you have been moving around a lot more than usual -   Thoughts that you would be better off dead, or of hurting yourself in some way -   Total Score 0   If you checked off any problems, how difficult have these problems made it for you to do your work, take care of things at home, or get along with other people? -       Health Habits and Functional and Cognitive Screening:  Functional & Cognitive Status 1/31/2022   Do you have difficulty preparing food and eating? No   Do you have difficulty bathing yourself, getting dressed or grooming yourself? No   Do you have difficulty using the toilet? No   Do you have difficulty moving around from place to place? No   Do you have trouble with steps or getting out of a bed or a chair? No   Current Diet -   Dental Exam Up to date   Eye Exam Up to date   Exercise (times per week) 3 times per week   Current Exercises Include Walking   Current Exercise Activities Include -   Do you need help using the phone?  No   Are you deaf or do you have serious difficulty hearing?  No   Do you need help with transportation? No   Do you need help shopping? No   Do you need help  preparing meals?  No   Do you need help with housework?  No   Do you need help with laundry? No   Do you need help taking your medications? No   Do you need help managing money? No   Do you ever drive or ride in a car without wearing a seat belt? No       Age-appropriate Screening Schedule:  Refer to the list below for future screening recommendations based on patient's age, sex and/or medical conditions. Orders for these recommended tests are listed in the plan section. The patient has been provided with a written plan.    Health Maintenance   Topic Date Due   • ZOSTER VACCINE (1 of 2) Never done   • LIPID PANEL  02/03/2022   • DXA SCAN  05/26/2022   • MAMMOGRAM  06/23/2023   • TDAP/TD VACCINES (3 - Td or Tdap) 10/03/2026   • INFLUENZA VACCINE  Completed              Assessment/Plan   CMS Preventative Services Quick Reference  Risk Factors Identified During Encounter  Cardiovascular Disease  Fall Risk-High or Moderate  The above risks/problems have been discussed with the patient.  Follow up actions/plans if indicated are seen below in the Assessment/Plan Section.  Pertinent information has been shared with the patient in the After Visit Summary.    Diagnoses and all orders for this visit:    1. Medicare annual wellness visit, subsequent (Primary)    2. Screening for iron deficiency anemia  -     CBC & Differential    3. Screening for diabetes mellitus  -     Comprehensive Metabolic Panel    4. Hyperlipidemia, unspecified hyperlipidemia type  -     Comprehensive Metabolic Panel  -     Lipid Panel With LDL / HDL Ratio    5. Osteopenia, unspecified location  -     DEXA Bone Density Axial    6. Abnormal findings on diagnostic imaging of limbs   -     DEXA Bone Density Axial        Follow Up:   Return if symptoms worsen or fail to improve.     An After Visit Summary and PPPS were made available to the patient.        I spent 20 minutes caring for Елена on this date of service. This time includes time spent by me in  the following activities:preparing for the visit, reviewing tests, counseling and educating the patient/family/caregiver, ordering medications, tests, or procedures and documenting information in the medical record       Mask and googles worn

## 2022-02-01 LAB
ALBUMIN SERPL-MCNC: 4 G/DL (ref 3.7–4.7)
ALBUMIN/GLOB SERPL: 1.4 {RATIO} (ref 1.2–2.2)
ALP SERPL-CCNC: 138 IU/L (ref 44–121)
ALT SERPL-CCNC: 20 IU/L (ref 0–32)
AST SERPL-CCNC: 27 IU/L (ref 0–40)
BASOPHILS # BLD AUTO: 0.1 X10E3/UL (ref 0–0.2)
BASOPHILS NFR BLD AUTO: 1 %
BILIRUB SERPL-MCNC: 0.4 MG/DL (ref 0–1.2)
BUN SERPL-MCNC: 5 MG/DL (ref 8–27)
BUN/CREAT SERPL: 10 (ref 12–28)
CALCIUM SERPL-MCNC: 9.7 MG/DL (ref 8.7–10.3)
CHLORIDE SERPL-SCNC: 99 MMOL/L (ref 96–106)
CHOLEST SERPL-MCNC: 150 MG/DL (ref 100–199)
CO2 SERPL-SCNC: 26 MMOL/L (ref 20–29)
CREAT SERPL-MCNC: 0.52 MG/DL (ref 0.57–1)
EOSINOPHIL # BLD AUTO: 0.4 X10E3/UL (ref 0–0.4)
EOSINOPHIL NFR BLD AUTO: 6 %
ERYTHROCYTE [DISTWIDTH] IN BLOOD BY AUTOMATED COUNT: 11.8 % (ref 11.7–15.4)
GLOBULIN SER CALC-MCNC: 2.9 G/DL (ref 1.5–4.5)
GLUCOSE SERPL-MCNC: 97 MG/DL (ref 65–99)
HCT VFR BLD AUTO: 42.2 % (ref 34–46.6)
HDLC SERPL-MCNC: 49 MG/DL
HGB BLD-MCNC: 14.6 G/DL (ref 11.1–15.9)
IMM GRANULOCYTES # BLD AUTO: 0 X10E3/UL (ref 0–0.1)
IMM GRANULOCYTES NFR BLD AUTO: 0 %
LDLC SERPL CALC-MCNC: 74 MG/DL (ref 0–99)
LDLC/HDLC SERPL: 1.5 RATIO (ref 0–3.2)
LYMPHOCYTES # BLD AUTO: 1.6 X10E3/UL (ref 0.7–3.1)
LYMPHOCYTES NFR BLD AUTO: 30 %
MCH RBC QN AUTO: 31.9 PG (ref 26.6–33)
MCHC RBC AUTO-ENTMCNC: 34.6 G/DL (ref 31.5–35.7)
MCV RBC AUTO: 92 FL (ref 79–97)
MONOCYTES # BLD AUTO: 0.6 X10E3/UL (ref 0.1–0.9)
MONOCYTES NFR BLD AUTO: 11 %
NEUTROPHILS # BLD AUTO: 2.8 X10E3/UL (ref 1.4–7)
NEUTROPHILS NFR BLD AUTO: 52 %
PLATELET # BLD AUTO: 495 X10E3/UL (ref 150–450)
POTASSIUM SERPL-SCNC: 4.8 MMOL/L (ref 3.5–5.2)
PROT SERPL-MCNC: 6.9 G/DL (ref 6–8.5)
RBC # BLD AUTO: 4.57 X10E6/UL (ref 3.77–5.28)
SODIUM SERPL-SCNC: 139 MMOL/L (ref 134–144)
TRIGL SERPL-MCNC: 157 MG/DL (ref 0–149)
VLDLC SERPL CALC-MCNC: 27 MG/DL (ref 5–40)
WBC # BLD AUTO: 5.5 X10E3/UL (ref 3.4–10.8)

## 2022-02-09 DIAGNOSIS — E78.5 HYPERLIPIDEMIA, UNSPECIFIED HYPERLIPIDEMIA TYPE: ICD-10-CM

## 2022-02-10 ENCOUNTER — TRANSCRIBE ORDERS (OUTPATIENT)
Dept: ADMINISTRATIVE | Facility: HOSPITAL | Age: 77
End: 2022-02-10

## 2022-02-10 DIAGNOSIS — R93.89 ABNORMAL CT SCAN: Primary | ICD-10-CM

## 2022-02-10 RX ORDER — ATORVASTATIN CALCIUM 10 MG/1
TABLET, FILM COATED ORAL
Qty: 90 TABLET | Refills: 3 | Status: SHIPPED | OUTPATIENT
Start: 2022-02-10 | End: 2023-01-09

## 2022-02-17 ENCOUNTER — APPOINTMENT (OUTPATIENT)
Dept: CT IMAGING | Facility: HOSPITAL | Age: 77
End: 2022-02-17

## 2022-04-12 ENCOUNTER — OFFICE VISIT (OUTPATIENT)
Dept: FAMILY MEDICINE CLINIC | Facility: CLINIC | Age: 77
End: 2022-04-12

## 2022-04-12 VITALS
HEART RATE: 103 BPM | BODY MASS INDEX: 20.61 KG/M2 | OXYGEN SATURATION: 98 % | WEIGHT: 112 LBS | HEIGHT: 62 IN | SYSTOLIC BLOOD PRESSURE: 124 MMHG | TEMPERATURE: 98 F | DIASTOLIC BLOOD PRESSURE: 68 MMHG

## 2022-04-12 DIAGNOSIS — R11.0 NAUSEA: Primary | ICD-10-CM

## 2022-04-12 PROCEDURE — 74018 RADEX ABDOMEN 1 VIEW: CPT | Performed by: NURSE PRACTITIONER

## 2022-04-12 PROCEDURE — 99213 OFFICE O/P EST LOW 20 MIN: CPT | Performed by: NURSE PRACTITIONER

## 2022-04-12 RX ORDER — SUCRALFATE 1 G/1
1 TABLET ORAL 4 TIMES DAILY
Qty: 40 TABLET | Refills: 0 | Status: SHIPPED | OUTPATIENT
Start: 2022-04-12 | End: 2022-05-03 | Stop reason: SDUPTHER

## 2022-04-12 RX ORDER — BUDESONIDE 0.5 MG/2ML
0.5 INHALANT ORAL
COMMUNITY

## 2022-04-12 RX ORDER — ONDANSETRON 4 MG/1
4 TABLET, FILM COATED ORAL EVERY 8 HOURS PRN
Qty: 60 TABLET | Refills: 0 | Status: SHIPPED | OUTPATIENT
Start: 2022-04-12 | End: 2022-04-19 | Stop reason: SINTOL

## 2022-04-12 RX ORDER — BUSPIRONE HYDROCHLORIDE 10 MG/1
10 TABLET ORAL DAILY
COMMUNITY
End: 2022-07-20 | Stop reason: SDUPTHER

## 2022-04-12 NOTE — PROGRESS NOTES
"Chief Complaint  Nausea (Pt says she has been nauseous for about 2 months on and off. Pt has tried chamomile tea, ginger ale and ginger lozenges)    Subjective          Елена Beach presents to [facility]  History of Present Illness  Nausea for the past 2 months that comes and goes. No vomiting.  She thought it was her anti anxiety med but she stopped it and still having nausea.  No appetite. No heartburn. BM are normal. No weight loss. No d,c,f. No headaches.  Nothing makes it worse sometimes came mille tea will make it better.   On Prilosec or GERD and believes that it is working and GERD is well controlled. No dizziness.   Stomach feels like it is jumping and queasy feeling. No pain.  General queasy feeling in stomach.   At times she chokes or food and liquids and years ago had a scope and thinks it was normal.    sHE SEES dR. Philip BUT HAS NOT SINCE HIM IN A WHILE.      Objective   Vital Signs:   /68   Pulse 103   Temp 98 °F (36.7 °C)   Ht 157.5 cm (62\")   Wt 50.8 kg (112 lb)   SpO2 98%   BMI 20.49 kg/m²     BMI is within normal parameters. No follow-up required.      Physical Exam  Vitals reviewed.   Constitutional:       General: She is not in acute distress.     Appearance: She is well-developed.   HENT:      Head: Normocephalic.   Cardiovascular:      Rate and Rhythm: Normal rate and regular rhythm.      Heart sounds: Normal heart sounds.   Pulmonary:      Effort: Pulmonary effort is normal.      Breath sounds: Normal breath sounds.   Neurological:      Mental Status: She is alert and oriented to person, place, and time.      Gait: Gait normal.   Psychiatric:         Behavior: Behavior normal.         Thought Content: Thought content normal.         Judgment: Judgment normal.        Result Review :   The following data was reviewed by: BEATRIS Collins on 04/12/2022:  CMP    CMP 1/31/22   Glucose 97   BUN 5 (A)   Creatinine 0.52 (A)   eGFR Non African Am 94 "   eGFR African Am 108   Sodium 139   Potassium 4.8   Chloride 99   Calcium 9.7   Total Protein 6.9   Albumin 4.0   Globulin 2.9   Total Bilirubin 0.4   Alkaline Phosphatase 138 (A)   AST (SGOT) 27   ALT (SGPT) 20   (A) Abnormal value       Comments are available for some flowsheets but are not being displayed.           CBC w/diff    CBC w/Diff 1/31/22   WBC 5.5   RBC 4.57   Hemoglobin 14.6   Hematocrit 42.2   MCV 92   MCH 31.9   MCHC 34.6   RDW 11.8   Platelets 495 (A)   Neutrophil Rel % 52   Lymphocyte Rel % 30   Monocyte Rel % 11   Eosinophil Rel % 6   Basophil Rel % 1   (A) Abnormal value            Lipid Panel    Lipid Panel 1/31/22   Total Cholesterol 150   Triglycerides 157 (A)   HDL Cholesterol 49   VLDL Cholesterol 27   LDL Cholesterol  74   LDL/HDL Ratio 1.5   (A) Abnormal value       Comments are available for some flowsheets but are not being displayed.                             Assessment and Plan    Diagnoses and all orders for this visit:    1. Nausea (Primary)  -     ondansetron (Zofran) 4 MG tablet; Take 1 tablet by mouth Every 8 (Eight) Hours As Needed for Nausea or Vomiting.  Dispense: 60 tablet; Refill: 0  -     sucralfate (Carafate) 1 g tablet; Take 1 tablet by mouth 4 (Four) Times a Day.  Dispense: 40 tablet; Refill: 0  -     CBC & Differential  -     Comprehensive Metabolic Panel  -     XR Abdomen KUB (In Office)  -     H. Pylori Breath Test - Breath, Lung        Follow Up   Return if symptoms worsen or fail to improve.  Patient was given instructions and counseling regarding her condition or for health maintenance advice. Please see specific information pulled into the AVS if appropriate.     Start meds  Follow up with Dr. Marshall.  I believe she needs a scope.   Unable to view xray in office today due to machine complications. Will let radiology read.  Mask and googles worn

## 2022-04-14 LAB
ALBUMIN SERPL-MCNC: 4.3 G/DL (ref 3.7–4.7)
ALBUMIN/GLOB SERPL: 1.9 {RATIO} (ref 1.2–2.2)
ALP SERPL-CCNC: 115 IU/L (ref 44–121)
ALT SERPL-CCNC: 22 IU/L (ref 0–32)
AST SERPL-CCNC: 30 IU/L (ref 0–40)
BASOPHILS # BLD AUTO: 0.1 X10E3/UL (ref 0–0.2)
BASOPHILS NFR BLD AUTO: 1 %
BILIRUB SERPL-MCNC: 0.5 MG/DL (ref 0–1.2)
BUN SERPL-MCNC: 5 MG/DL (ref 8–27)
BUN/CREAT SERPL: 8 (ref 12–28)
CALCIUM SERPL-MCNC: 9.6 MG/DL (ref 8.7–10.3)
CHLORIDE SERPL-SCNC: 99 MMOL/L (ref 96–106)
CO2 SERPL-SCNC: 22 MMOL/L (ref 20–29)
CREAT SERPL-MCNC: 0.59 MG/DL (ref 0.57–1)
EGFRCR SERPLBLD CKD-EPI 2021: 93 ML/MIN/1.73
EOSINOPHIL # BLD AUTO: 0.6 X10E3/UL (ref 0–0.4)
EOSINOPHIL NFR BLD AUTO: 10 %
ERYTHROCYTE [DISTWIDTH] IN BLOOD BY AUTOMATED COUNT: 11.7 % (ref 11.7–15.4)
GLOBULIN SER CALC-MCNC: 2.3 G/DL (ref 1.5–4.5)
GLUCOSE SERPL-MCNC: 137 MG/DL (ref 65–99)
HCT VFR BLD AUTO: 40.7 % (ref 34–46.6)
HGB BLD-MCNC: 14.1 G/DL (ref 11.1–15.9)
IMM GRANULOCYTES # BLD AUTO: 0 X10E3/UL (ref 0–0.1)
IMM GRANULOCYTES NFR BLD AUTO: 0 %
LYMPHOCYTES # BLD AUTO: 1.6 X10E3/UL (ref 0.7–3.1)
LYMPHOCYTES NFR BLD AUTO: 26 %
MCH RBC QN AUTO: 31.5 PG (ref 26.6–33)
MCHC RBC AUTO-ENTMCNC: 34.6 G/DL (ref 31.5–35.7)
MCV RBC AUTO: 91 FL (ref 79–97)
MONOCYTES # BLD AUTO: 0.5 X10E3/UL (ref 0.1–0.9)
MONOCYTES NFR BLD AUTO: 9 %
NEUTROPHILS # BLD AUTO: 3.2 X10E3/UL (ref 1.4–7)
NEUTROPHILS NFR BLD AUTO: 54 %
PLATELET # BLD AUTO: 376 X10E3/UL (ref 150–450)
POTASSIUM SERPL-SCNC: 4.3 MMOL/L (ref 3.5–5.2)
PROT SERPL-MCNC: 6.6 G/DL (ref 6–8.5)
RBC # BLD AUTO: 4.47 X10E6/UL (ref 3.77–5.28)
SODIUM SERPL-SCNC: 137 MMOL/L (ref 134–144)
WBC # BLD AUTO: 5.9 X10E3/UL (ref 3.4–10.8)

## 2022-04-15 LAB — UREA BREATH TEST QL: NEGATIVE

## 2022-04-15 RX ORDER — OMEPRAZOLE 40 MG/1
CAPSULE, DELAYED RELEASE ORAL
Qty: 90 CAPSULE | Refills: 1 | Status: SHIPPED | OUTPATIENT
Start: 2022-04-15 | End: 2022-04-19 | Stop reason: SDUPTHER

## 2022-04-19 ENCOUNTER — TELEPHONE (OUTPATIENT)
Dept: GASTROENTEROLOGY | Facility: CLINIC | Age: 77
End: 2022-04-19

## 2022-04-19 ENCOUNTER — HOSPITAL ENCOUNTER (OUTPATIENT)
Dept: CT IMAGING | Facility: HOSPITAL | Age: 77
Discharge: HOME | End: 2022-04-19
Admitting: INTERNAL MEDICINE

## 2022-04-19 ENCOUNTER — OFFICE VISIT (OUTPATIENT)
Dept: GASTROENTEROLOGY | Facility: CLINIC | Age: 77
End: 2022-04-19

## 2022-04-19 VITALS
HEIGHT: 62 IN | OXYGEN SATURATION: 96 % | TEMPERATURE: 96.9 F | BODY MASS INDEX: 20.09 KG/M2 | WEIGHT: 109.2 LBS | SYSTOLIC BLOOD PRESSURE: 112 MMHG | DIASTOLIC BLOOD PRESSURE: 74 MMHG | HEART RATE: 94 BPM

## 2022-04-19 DIAGNOSIS — K29.80 DUODENITIS: ICD-10-CM

## 2022-04-19 DIAGNOSIS — R11.0 NAUSEA: Primary | ICD-10-CM

## 2022-04-19 DIAGNOSIS — K44.9 HIATAL HERNIA: ICD-10-CM

## 2022-04-19 DIAGNOSIS — R68.81 EARLY SATIETY: ICD-10-CM

## 2022-04-19 DIAGNOSIS — R93.89 ABNORMAL CT SCAN: ICD-10-CM

## 2022-04-19 DIAGNOSIS — K21.9 GASTROESOPHAGEAL REFLUX DISEASE WITHOUT ESOPHAGITIS: ICD-10-CM

## 2022-04-19 PROCEDURE — 71250 CT THORAX DX C-: CPT

## 2022-04-19 PROCEDURE — 99214 OFFICE O/P EST MOD 30 MIN: CPT | Performed by: NURSE PRACTITIONER

## 2022-04-19 RX ORDER — OMEPRAZOLE 40 MG/1
40 CAPSULE, DELAYED RELEASE ORAL
Qty: 60 CAPSULE | Refills: 0 | Status: SHIPPED | OUTPATIENT
Start: 2022-04-19 | End: 2022-07-06 | Stop reason: SDUPTHER

## 2022-04-19 NOTE — TELEPHONE ENCOUNTER
TERE Persaud for EGD on 06/22/2022  arrive at 1330pm  . Gave Prep instructions in office.    Advised PT  that  will call with final arrival time  24 hrs before procedure. If they do not get a phone call, arrival time will stay the same as given on instructions

## 2022-04-19 NOTE — PROGRESS NOTES
Chief Complaint   Patient presents with   • Nausea   • Abdominal Pain       Елена Beach is a  77 y.o. female here for a follow up visit for nausea.    HPI  77-year-old female presents today for follow-up visit for nausea.  She is a patient of Dr. Marshall.  She was last seen in the office by me on 9/15/2020.  She has a history of GERD/hiatal hernia and underwent an EGD in 2018.  At that time the scope showed duodenitis and gastritis.  She has been taking omeprazole 40 mg daily but admits she still having just constant nausea.  She tells me the nausea has really been worse since March.  She tells me initially in March she also had early satiety and just in general feeling full and could not eat very much at all.  She had the symptoms along with the nausea.  She is happy to report that the early satiety has since subsided and she is able to eat better now.  She does get relief from her nausea from ginger ale and chamomile tea.  She did talk to her primary care about her symptoms and an H. pylori breath test was done which was negative.  And she had a KUB done which was normal.  She was given a prescription of Zofran but she tells me unfortunately it gave her a headache and made her have constipation.  She is happy though with the Carafate.  She does feel like it is somewhat helpful.  She denies any dysphagia, vomiting, diarrhea, constipation, rectal bleeding or melena.  She admits her appetite is improving and her weight has dropped 5 pounds since January of this year.  She had COVID-19 this past January.  She does have a history of COPD.  She denies any new medications.  She denies any recent antibiotics.  She does have a history of diarrhea with fecal incontinence but she admits lately she is not had any diarrhea whatsoever.  Her bowels have really been moving well.  Past Medical History:   Diagnosis Date   • Anemia    • Arthritis    • Asthma    • Back pain    • Bursitis 1995   • COPD (chronic obstructive  "pulmonary disease) ([facility])    • COVID-19 2022   • Fibromyalgia    • Fibromyositis    • GERD (gastroesophageal reflux disease)    • Hiatal hernia    • History of pneumonia    • Hyperlipidemia    • Knee pain    • Osteoarthritis    • Osteoporosis    • PONV (postoperative nausea and vomiting)    • Scoliosis        Past Surgical History:   Procedure Laterality Date   • COLONOSCOPY     • ENDOSCOPY     • FOOT SURGERY Right 2006   • HAND SURGERY     • SHOULDER ARTHROSCOPY     • SHOULDER SURGERY Left    • TOE SURGERY Right    • TOOTH EXTRACTION     • TOTAL KNEE ARTHROPLASTY Right 2018    Procedure: TOTAL KNEE ARTHROPLASTY;  Surgeon: Gerry Estevez MD;  Location: [facility] OR;  Service: Orthopedics   • TUBAL ABDOMINAL LIGATION     • WRIST SURGERY         Scheduled Meds:    Continuous Infusions:No current facility-administered medications for this visit.      PRN Meds:.    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   • Codeine Other (See Comments)     Keeps awake   • Ibuprofen Other (See Comments)     Can't take due to hiatal hernia   • Keflex [Cephalexin] Other (See Comments)     Makes feel \"goofy\"   • Pseudoephedrine Other (See Comments)     Heart palpitations       Social History     Socioeconomic History   • Marital status: Single   • Number of children: 1   Tobacco Use   • Smoking status: Former Smoker     Packs/day: 1.00     Years: 40.00     Pack years: 40.00     Types: Cigarettes     Quit date: 2016     Years since quittin.4   • Smokeless tobacco: Never Used   Vaping Use   • Vaping Use: Never used   Substance and Sexual Activity   • Alcohol use: Yes     Alcohol/week: 1.0 standard drink     Types: 1 Shots of liquor per week     Comment: occ   • Drug use: No   • Sexual activity: Not Currently     Partners: Male     Birth " control/protection: Other       Family History   Problem Relation Age of Onset   • Heart failure Mother    • Hypertension Mother    • Cancer Mother         breast   • Cancer Father         prostate   • Malig Hyperthermia Neg Hx        Review of Systems   Constitutional: Negative for appetite change, chills, diaphoresis, fatigue, fever and unexpected weight change.   HENT: Negative for nosebleeds, postnasal drip, sore throat, trouble swallowing and voice change.    Respiratory: Negative for cough, choking, chest tightness, shortness of breath, wheezing and stridor.    Cardiovascular: Negative for chest pain, palpitations and leg swelling.   Gastrointestinal: Positive for abdominal distention and nausea. Negative for abdominal pain, anal bleeding, blood in stool, constipation, diarrhea, rectal pain and vomiting.   Endocrine: Negative for polydipsia, polyphagia and polyuria.   Musculoskeletal: Negative for gait problem.   Skin: Negative for rash and wound.   Allergic/Immunologic: Negative for food allergies.   Neurological: Negative for dizziness, speech difficulty and light-headedness.   Psychiatric/Behavioral: Negative for confusion, self-injury, sleep disturbance and suicidal ideas.       Vitals:    04/19/22 1335   BP: 112/74   Pulse: 94   Temp: 96.9 °F (36.1 °C)   SpO2: 96%       Physical Exam  Constitutional:       General: She is not in acute distress.     Appearance: She is well-developed. She is not ill-appearing.   HENT:      Head: Normocephalic.   Eyes:      Pupils: Pupils are equal, round, and reactive to light.   Cardiovascular:      Rate and Rhythm: Normal rate and regular rhythm.      Heart sounds: Normal heart sounds.   Pulmonary:      Effort: Pulmonary effort is normal.      Breath sounds: Normal breath sounds.   Abdominal:      General: Bowel sounds are normal. There is no distension.      Palpations: Abdomen is soft. There is no mass.      Tenderness: There is no abdominal tenderness. There is no  guarding or rebound.      Hernia: No hernia is present.   Musculoskeletal:         General: Normal range of motion.   Skin:     General: Skin is warm and dry.   Neurological:      Mental Status: She is alert and oriented to person, place, and time.   Psychiatric:         Speech: Speech normal.         Behavior: Behavior normal.         Judgment: Judgment normal.         CT Chest Without Contrast Diagnostic    Result Date: 4/19/2022  1. 5 mm nodular density in the periphery of the right upper lobe, not clearly present on the comparison study. Follow-up chest CT is recommended in 6 months to evaluate for stability or clearing 2. Bronchial wall thickening in the lower lobes suggesting bronchitis    Radiation dose reduction techniques were utilized, including automated exposure control and exposure modulation based on body size.  This report was finalized on 4/19/2022 2:24 PM by Dr. Mustapha Sandoval M.D.      XR Abdomen KUB (In Office)    Result Date: 4/12/2022  .[email or web address]         Diagnoses and all orders for this visit:    1. Nausea (Primary)  -     Case Request; Standing  -     COVID PRE-OP / PRE-PROCEDURE SCREENING ORDER (NO ISOLATION) - Swab, Nasopharynx; Future  -     Case Request    2. Hiatal hernia  -     Case Request; Standing  -     COVID PRE-OP / PRE-PROCEDURE SCREENING ORDER (NO ISOLATION) - Swab, Nasopharynx; Future  -     Case Request    3. Gastroesophageal reflux disease without esophagitis  Overview:  Added automatically from request for surgery [id number]    Orders:  -     Case Request; Standing  -     COVID PRE-OP / PRE-PROCEDURE SCREENING ORDER (NO ISOLATION) - Swab, Nasopharynx; Future  -     Case Request    4. Duodenitis  Overview:  Added automatically from request for surgery [id number]    Orders:  -     Case Request; Standing  -     COVID PRE-OP / PRE-PROCEDURE SCREENING ORDER (NO ISOLATION) - Swab, Nasopharynx; Future  -     Case Request    5. Early satiety  Overview:  Added automatically from  request for surgery [id number]    Orders:  -     Case Request; Standing  -     COVID PRE-OP / PRE-PROCEDURE SCREENING ORDER (NO ISOLATION) - Swab, Nasopharynx; Future  -     Case Request    Other orders  -     omeprazole (priLOSEC) 40 MG capsule; Take 1 capsule by mouth 2 (Two) Times a Day Before Meals.  Dispense: 60 capsule; Refill: 0  -     Follow Anesthesia Guidelines / Protocol; Future  -     Obtain Informed Consent; Future      Given her history would recommend an EGD with Dr. Marshall for further evaluation.  Patient is agreeable to the scope.  We will also go ahead and increase the omeprazole to 40 mg twice daily.  I would also like her to increase the Carafate to twice daily as well.  Continue GERD precautions.  I did advise her to stop the Yuri tea that she has been drinking every morning.  Okay to continue chamomile tea and the ginger ale if it helps.  Continue GERD precautions.  Reviewed most recent labs and imaging results with her today.  H. pylori was negative.  KUB was normal.  Labs were unremarkable.  Patient to call the office next week with an update.  Patient to follow-up with me as planned.  Patient is agreeable to the plan.

## 2022-04-28 ENCOUNTER — TRANSCRIBE ORDERS (OUTPATIENT)
Dept: ADMINISTRATIVE | Facility: HOSPITAL | Age: 77
End: 2022-04-28

## 2022-04-28 DIAGNOSIS — R91.1 PULMONARY NODULE: Primary | ICD-10-CM

## 2022-05-03 ENCOUNTER — OFFICE VISIT (OUTPATIENT)
Dept: GASTROENTEROLOGY | Facility: CLINIC | Age: 77
End: 2022-05-03

## 2022-05-03 VITALS
DIASTOLIC BLOOD PRESSURE: 64 MMHG | WEIGHT: 108 LBS | TEMPERATURE: 96.6 F | SYSTOLIC BLOOD PRESSURE: 108 MMHG | BODY MASS INDEX: 19.88 KG/M2 | HEIGHT: 62 IN | HEART RATE: 91 BPM

## 2022-05-03 DIAGNOSIS — R11.0 NAUSEA: ICD-10-CM

## 2022-05-03 DIAGNOSIS — K29.80 DUODENITIS: ICD-10-CM

## 2022-05-03 DIAGNOSIS — K44.9 HIATAL HERNIA: ICD-10-CM

## 2022-05-03 DIAGNOSIS — K21.9 GASTROESOPHAGEAL REFLUX DISEASE WITHOUT ESOPHAGITIS: Primary | ICD-10-CM

## 2022-05-03 PROCEDURE — 99214 OFFICE O/P EST MOD 30 MIN: CPT | Performed by: NURSE PRACTITIONER

## 2022-05-03 RX ORDER — SUCRALFATE 1 G/1
1 TABLET ORAL
Qty: 60 TABLET | Refills: 1 | Status: SHIPPED | OUTPATIENT
Start: 2022-05-03 | End: 2022-07-06

## 2022-05-03 NOTE — PROGRESS NOTES
Chief Complaint   Patient presents with   • Heartburn   • Nausea       Елена Beach is a  77 y.o. female here for a follow up visit for GERD.    HPI  77-year-old female presents today for follow-up visit for GERD.  She is a patient of Dr. Marshall.  She was last seen in the office by me on 4/19/2022.  She has a history of GERD/duodenitis/hiatal hernia and admits she is done really well with increasing the omeprazole to 40 mg twice daily and adding Carafate.  She tells me she is doing so well she does not feel like she needs the Carafate anymore.  She is happy report that the nausea is also better since doubling up on the omeprazole.  Her last EGD was in 2018.  She is scheduled next month for an EGD with Dr. Marshall on 6/22/2022.  She denies any dysphagia, abdominal pain, nausea and vomiting, diarrhea, constipation, rectal bleeding or melena.  She admits her appetite is okay and her weight has dropped 6 pounds since early February of this year.  She does have a history of COPD and admits that is really acting up right now due to the weather.  Recently her PCP checked an H. pylori test which was negative.  She also had a KUB done which was also normal.  She has had a colonoscopy but she admits it was many years ago.  Past Medical History:   Diagnosis Date   • Anemia    • Arthritis    • Asthma    • Back pain    • Bursitis 1995   • Celiac disease    • COPD (chronic obstructive pulmonary disease) ([facility])    • COVID-19 01/2022   • Fibromyalgia    • Fibromyositis 1995   • GERD (gastroesophageal reflux disease)    • Hernia    • Hiatal hernia    • History of pneumonia    • Hyperlipidemia    • Knee pain    • Osteoarthritis 2008   • Osteoporosis    • PONV (postoperative nausea and vomiting)    • Scoliosis 1995       Past Surgical History:   Procedure Laterality Date   • COLONOSCOPY     • ENDOSCOPY     • FOOT SURGERY Right 2006   • HAND SURGERY  2004   • SHOULDER ARTHROSCOPY  2014   • SHOULDER SURGERY Left    • TOE SURGERY  "Right    • TOOTH EXTRACTION     • TOTAL KNEE ARTHROPLASTY Right 2018    Procedure: TOTAL KNEE ARTHROPLASTY;  Surgeon: Gerry Estevez MD;  Location: [facility];  Service: Orthopedics   • TUBAL ABDOMINAL LIGATION     • UPPER GASTROINTESTINAL ENDOSCOPY     • WRIST SURGERY         Scheduled Meds:    Continuous Infusions:No current facility-administered medications for this visit.      PRN Meds:.    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   • Codeine Other (See Comments)     Keeps awake   • Ibuprofen Other (See Comments)     Can't take due to hiatal hernia   • Keflex [Cephalexin] Other (See Comments)     Makes feel \"goofy\"   • Pseudoephedrine Other (See Comments)     Heart palpitations       Social History     Socioeconomic History   • Marital status: Single   • Number of children: 1   Tobacco Use   • Smoking status: Former Smoker     Packs/day: 1.00     Years: 40.00     Pack years: 40.00     Types: Cigarettes     Quit date: 2016     Years since quittin.5   • Smokeless tobacco: Never Used   Vaping Use   • Vaping Use: Never used   Substance and Sexual Activity   • Alcohol use: Yes     Alcohol/week: 1.0 standard drink     Types: 1 Shots of liquor per week     Comment: occ   • Drug use: No   • Sexual activity: Not Currently     Partners: Male     Birth control/protection: Other       Family History   Problem Relation Age of Onset   • Heart failure Mother    • Hypertension Mother    • Cancer Mother         breast   • Cancer Father         prostate   • Malig Hyperthermia Neg Hx        Review of Systems   Constitutional: Negative for appetite change, chills, diaphoresis, fatigue, fever and unexpected weight change.   HENT: Negative for nosebleeds, postnasal drip, sore throat, trouble swallowing and voice change.    Respiratory: Negative for cough, choking, " chest tightness, shortness of breath, wheezing and stridor.    Cardiovascular: Negative for chest pain, palpitations and leg swelling.   Gastrointestinal: Negative for abdominal distention, abdominal pain, anal bleeding, blood in stool, constipation, diarrhea, nausea, rectal pain and vomiting.   Endocrine: Negative for polydipsia, polyphagia and polyuria.   Musculoskeletal: Negative for gait problem.   Skin: Negative for rash and wound.   Allergic/Immunologic: Negative for food allergies.   Neurological: Negative for dizziness, speech difficulty and light-headedness.   Psychiatric/Behavioral: Negative for confusion, self-injury, sleep disturbance and suicidal ideas.       Vitals:    05/03/22 1059   BP: 108/64   Pulse: 91   Temp: 96.6 °F (35.9 °C)       Physical Exam  Constitutional:       General: She is not in acute distress.     Appearance: She is well-developed. She is not ill-appearing.   HENT:      Head: Normocephalic.   Eyes:      Pupils: Pupils are equal, round, and reactive to light.   Cardiovascular:      Rate and Rhythm: Normal rate and regular rhythm.      Heart sounds: Normal heart sounds.   Pulmonary:      Effort: Pulmonary effort is normal.      Breath sounds: Normal breath sounds.   Abdominal:      General: Bowel sounds are normal. There is no distension.      Palpations: Abdomen is soft. There is no mass.      Tenderness: There is no abdominal tenderness. There is no guarding or rebound.      Hernia: No hernia is present.   Musculoskeletal:         General: Normal range of motion.   Skin:     General: Skin is warm and dry.   Neurological:      Mental Status: She is alert and oriented to person, place, and time.   Psychiatric:         Speech: Speech normal.         Behavior: Behavior normal.         Judgment: Judgment normal.         No radiology results for the last 7 days     Diagnoses and all orders for this visit:    1. Gastroesophageal reflux disease without esophagitis  (Primary)  Overview:  Added automatically from request for surgery [id number]      2. Duodenitis  Overview:  Added automatically from request for surgery [id number]      3. Hiatal hernia    4. Nausea  -     sucralfate (Carafate) 1 g tablet; Take 1 tablet by mouth 2 (Two) Times a Day Before Meals.  Dispense: 60 tablet; Refill: 1     GERD definitely seems much better since increasing the omeprazole to 40 mg twice daily.  Okay to stop Carafate for now.  Continue Carafate as needed.  Continue GERD precautions.  Patient to continue the omeprazole twice daily dosing until her EGD next month with Dr. Marshall.  Patient to call the office with any issues.  Patient follow-up with me after her scope.  Patient is agreeable to the plan.

## 2022-05-11 ENCOUNTER — TRANSCRIBE ORDERS (OUTPATIENT)
Dept: ADMINISTRATIVE | Facility: HOSPITAL | Age: 77
End: 2022-05-11

## 2022-05-11 DIAGNOSIS — Z92.89 HISTORY OF MAMMOGRAPHY, SCREENING: Primary | ICD-10-CM

## 2022-06-21 ENCOUNTER — TELEPHONE (OUTPATIENT)
Dept: GASTROENTEROLOGY | Facility: CLINIC | Age: 77
End: 2022-06-21

## 2022-06-21 ENCOUNTER — LAB (OUTPATIENT)
Dept: LAB | Facility: HOSPITAL | Age: 77
End: 2022-06-21

## 2022-06-21 DIAGNOSIS — R11.0 NAUSEA: ICD-10-CM

## 2022-06-21 DIAGNOSIS — K21.9 GASTROESOPHAGEAL REFLUX DISEASE WITHOUT ESOPHAGITIS: ICD-10-CM

## 2022-06-21 DIAGNOSIS — Z20.822 SUSPECTED COVID-19 VIRUS INFECTION: ICD-10-CM

## 2022-06-21 DIAGNOSIS — K29.80 DUODENITIS: ICD-10-CM

## 2022-06-21 DIAGNOSIS — K44.9 HIATAL HERNIA: ICD-10-CM

## 2022-06-21 DIAGNOSIS — R68.81 EARLY SATIETY: ICD-10-CM

## 2022-06-21 LAB — SARS-COV-2 ORF1AB RESP QL NAA+PROBE: NOT DETECTED

## 2022-06-21 PROCEDURE — U0004 COV-19 TEST NON-CDC HGH THRU: HCPCS

## 2022-06-21 PROCEDURE — U0005 INFEC AGEN DETEC AMPLI PROBE: HCPCS

## 2022-06-21 PROCEDURE — C9803 HOPD COVID-19 SPEC COLLECT: HCPCS

## 2022-06-21 RX ORDER — ARFORMOTEROL TARTRATE 15 UG/2ML
SOLUTION RESPIRATORY (INHALATION)
COMMUNITY
Start: 2022-05-19 | End: 2022-06-21

## 2022-06-21 RX ORDER — ALBUTEROL SULFATE 90 UG/1
1-2 AEROSOL, METERED RESPIRATORY (INHALATION) EVERY 4 HOURS PRN
COMMUNITY
Start: 2022-04-28 | End: 2022-11-21

## 2022-06-21 NOTE — TELEPHONE ENCOUNTER
Caller: LEONID ALARCON    Relationship: SELF    Best call back number: [phone]    What is the best time to reach you: NOW    Who are you requesting to speak with (clinical staff, provider,  specific staff member):     Do you require a callback: YES

## 2022-06-22 ENCOUNTER — ANESTHESIA (OUTPATIENT)
Dept: GASTROENTEROLOGY | Facility: HOSPITAL | Age: 77
End: 2022-06-22

## 2022-06-22 ENCOUNTER — ANESTHESIA EVENT (OUTPATIENT)
Dept: GASTROENTEROLOGY | Facility: HOSPITAL | Age: 77
End: 2022-06-22

## 2022-06-22 ENCOUNTER — HOSPITAL ENCOUNTER (OUTPATIENT)
Facility: HOSPITAL | Age: 77
Setting detail: HOSPITAL OUTPATIENT SURGERY
Discharge: HOME | End: 2022-06-22
Attending: INTERNAL MEDICINE | Admitting: INTERNAL MEDICINE

## 2022-06-22 VITALS
HEART RATE: 62 BPM | HEIGHT: 62 IN | DIASTOLIC BLOOD PRESSURE: 64 MMHG | BODY MASS INDEX: 19.75 KG/M2 | OXYGEN SATURATION: 95 % | RESPIRATION RATE: 20 BRPM | WEIGHT: 107.3 LBS | SYSTOLIC BLOOD PRESSURE: 98 MMHG

## 2022-06-22 DIAGNOSIS — R68.81 EARLY SATIETY: ICD-10-CM

## 2022-06-22 DIAGNOSIS — K29.80 DUODENITIS: ICD-10-CM

## 2022-06-22 DIAGNOSIS — K44.9 HIATAL HERNIA: ICD-10-CM

## 2022-06-22 DIAGNOSIS — K21.9 GASTROESOPHAGEAL REFLUX DISEASE WITHOUT ESOPHAGITIS: ICD-10-CM

## 2022-06-22 DIAGNOSIS — R11.0 NAUSEA: ICD-10-CM

## 2022-06-22 PROCEDURE — 25010000002 PROPOFOL 10 MG/ML EMULSION: Performed by: ANESTHESIOLOGY

## 2022-06-22 PROCEDURE — 87081 CULTURE SCREEN ONLY: CPT | Performed by: INTERNAL MEDICINE

## 2022-06-22 PROCEDURE — 43239 EGD BIOPSY SINGLE/MULTIPLE: CPT | Performed by: INTERNAL MEDICINE

## 2022-06-22 PROCEDURE — S0260 H&P FOR SURGERY: HCPCS | Performed by: INTERNAL MEDICINE

## 2022-06-22 PROCEDURE — 88305 TISSUE EXAM BY PATHOLOGIST: CPT | Performed by: INTERNAL MEDICINE

## 2022-06-22 RX ORDER — PROPOFOL 10 MG/ML
VIAL (ML) INTRAVENOUS AS NEEDED
Status: DISCONTINUED | OUTPATIENT
Start: 2022-06-22 | End: 2022-06-22 | Stop reason: SURG

## 2022-06-22 RX ORDER — LIDOCAINE HYDROCHLORIDE 20 MG/ML
INJECTION, SOLUTION INFILTRATION; PERINEURAL AS NEEDED
Status: DISCONTINUED | OUTPATIENT
Start: 2022-06-22 | End: 2022-06-22 | Stop reason: SURG

## 2022-06-22 RX ORDER — SODIUM CHLORIDE 0.9 % (FLUSH) 0.9 %
10 SYRINGE (ML) INJECTION AS NEEDED
Status: DISCONTINUED | OUTPATIENT
Start: 2022-06-22 | End: 2022-06-22 | Stop reason: HOSPADM

## 2022-06-22 RX ORDER — SODIUM CHLORIDE, SODIUM LACTATE, POTASSIUM CHLORIDE, CALCIUM CHLORIDE 600; 310; 30; 20 MG/100ML; MG/100ML; MG/100ML; MG/100ML
1000 INJECTION, SOLUTION INTRAVENOUS CONTINUOUS
Status: DISCONTINUED | OUTPATIENT
Start: 2022-06-22 | End: 2022-06-22 | Stop reason: HOSPADM

## 2022-06-22 RX ORDER — PROPOFOL 10 MG/ML
VIAL (ML) INTRAVENOUS CONTINUOUS PRN
Status: DISCONTINUED | OUTPATIENT
Start: 2022-06-22 | End: 2022-06-22 | Stop reason: SURG

## 2022-06-22 RX ORDER — LIDOCAINE HYDROCHLORIDE 10 MG/ML
0.5 INJECTION, SOLUTION INFILTRATION; PERINEURAL ONCE AS NEEDED
Status: DISCONTINUED | OUTPATIENT
Start: 2022-06-22 | End: 2022-06-22 | Stop reason: HOSPADM

## 2022-06-22 RX ADMIN — LIDOCAINE HYDROCHLORIDE 50 MG: 20 INJECTION, SOLUTION INFILTRATION; PERINEURAL at 14:25

## 2022-06-22 RX ADMIN — PROPOFOL 100 MG: 10 INJECTION, EMULSION INTRAVENOUS at 14:25

## 2022-06-22 RX ADMIN — Medication 200 MCG/KG/MIN: at 14:25

## 2022-06-22 RX ADMIN — SODIUM CHLORIDE, POTASSIUM CHLORIDE, SODIUM LACTATE AND CALCIUM CHLORIDE 1000 ML: 600; 310; 30; 20 INJECTION, SOLUTION INTRAVENOUS at 14:08

## 2022-06-22 NOTE — ANESTHESIA PREPROCEDURE EVALUATION
Anesthesia Evaluation     Patient summary reviewed and Nursing notes reviewed   history of anesthetic complications: PONV               Airway   Mallampati: II  TM distance: >3 FB  Neck ROM: full  No difficulty expected  Dental - normal exam     Pulmonary - normal exam   (+) a smoker Former, COPD, asthma,shortness of breath,   Cardiovascular - normal exam    ECG reviewed    (+) hyperlipidemia,       Neuro/Psych  (+) psychiatric history Depression,    GI/Hepatic/Renal/Endo    (+)  hiatal hernia, GERD,      ROS Comment: Celiac disease    Musculoskeletal     (+) back pain, myalgias,       ROS comment: Fibromyalgia  Abdominal  - normal exam   Substance History      OB/GYN          Other   arthritis,                    Anesthesia Plan    ASA 3     MAC     intravenous induction     Anesthetic plan, risks, benefits, and alternatives have been provided, discussed and informed consent has been obtained with: patient.    Plan discussed with CRNA.        CODE STATUS:

## 2022-06-22 NOTE — ANESTHESIA POSTPROCEDURE EVALUATION
Patient: Елена Beach    Procedure Summary     Date: 06/22/22 Room / Location:  [facility] /  [facility]    Anesthesia Start: 1420 Anesthesia Stop: 1436    Procedure: ESOPHAGOGASTRODUODENOSCOPY with biopsies (N/A Esophagus) Diagnosis:       Nausea      Hiatal hernia      Gastroesophageal reflux disease without esophagitis      Duodenitis      Early satiety      (Nausea [R11.0])      (Hiatal hernia [K44.9])      (Gastroesophageal reflux disease without esophagitis [K21.9])      (Duodenitis [K29.80])      (Early satiety [R68.81])    Surgeons: Igor Marshall MD Provider: Allen Michael MD    Anesthesia Type: MAC ASA Status: 3          Anesthesia Type: MAC    Vitals  No vitals data found for the desired time range.          Post Anesthesia Care and Evaluation    Patient location during evaluation: PHASE II  Patient participation: complete - patient participated  Level of consciousness: awake and alert  Pain management: adequate    Airway patency: patent  Anesthetic complications: No anesthetic complications  PONV Status: none  Cardiovascular status: acceptable and hemodynamically stable  Respiratory status: acceptable, nonlabored ventilation and spontaneous ventilation  Hydration status: acceptable

## 2022-06-22 NOTE — H&P
[facility]  Pre Procedure History & Physical    Chief Complaint:   GERD, gluten enteropathy    Subjective     HPI:   77-year-old female presents the endoscopy suite for upper endoscopic evaluation.  She has issues with reflux, nausea, and known gluten enteropathy.  Last endoscopy performed in 2018.    Past Medical History:   Past Medical History:   Diagnosis Date   • Anemia    • Arthritis    • Asthma    • Back pain    • Bursitis 1995   • Celiac disease    • COPD (chronic obstructive pulmonary disease) ([facility])    • COVID-19 01/2022   • Fibromyalgia    • Fibromyositis 1995   • GERD (gastroesophageal reflux disease)    • Hernia    • Hiatal hernia    • History of pneumonia    • Hyperlipidemia    • Knee pain    • Osteoarthritis 2008   • Osteoporosis    • PONV (postoperative nausea and vomiting)    • Scoliosis 1995       Past Surgical History:  Past Surgical History:   Procedure Laterality Date   • COLONOSCOPY     • ENDOSCOPY     • FOOT SURGERY Right 2006   • HAND SURGERY  2004   • SHOULDER ARTHROSCOPY  2014   • SHOULDER SURGERY Left    • TOE SURGERY Right    • TOOTH EXTRACTION     • TOTAL KNEE ARTHROPLASTY Right 12/27/2018    Procedure: TOTAL KNEE ARTHROPLASTY;  Surgeon: Gerry Estevez MD;  Location: [facility];  Service: Orthopedics   • TUBAL ABDOMINAL LIGATION     • UPPER GASTROINTESTINAL ENDOSCOPY     • WRIST SURGERY         Family History:  Family History   Problem Relation Age of Onset   • Heart failure Mother    • Hypertension Mother    • Cancer Mother         breast   • Cancer Father         prostate   • Malig Hyperthermia Neg Hx        Social History:   reports that she quit smoking about 5 years ago. Her smoking use included cigarettes. She has a 40.00 pack-year smoking history. She has never used smokeless tobacco. She reports current alcohol use of about 1.0 standard drink of alcohol per week. She reports that she does not use drugs.    Medications:   No medications prior to  admission.       Allergies:  Fish-derived products, Cephalosporins, Codeine, Ibuprofen, Keflex [cephalexin], and Pseudoephedrine    ROS:    Pertinent items are noted in HPI, all other systems reviewed and negative     Objective     There were no vitals taken for this visit.    Physical Exam   Constitutional: Pt is oriented to person, place, and time and well-developed, well-nourished, and in no distress.   Mouth/Throat: Oropharynx is clear and moist.   Neck: Normal range of motion.   Cardiovascular: Normal rate, regular rhythm and normal heart sounds.    Pulmonary/Chest: Effort normal and breath sounds normal.   Abdominal: Soft. Nontender  Skin: Skin is warm and dry.   Psychiatric: Mood, memory, affect and judgment normal.     Assessment & Plan     Diagnosis:  GERD  Nausea  Gluten enteropathy    Anticipated Surgical Procedure:  EGD    The risks, benefits, and alternatives of this procedure have been discussed with the patient or the responsible party- the patient understands and agrees to proceed.

## 2022-06-23 LAB — UREASE TISS QL: NEGATIVE

## 2022-06-27 LAB
LAB AP CASE REPORT: NORMAL
LAB AP DIAGNOSIS COMMENT: NORMAL
PATH REPORT.FINAL DX SPEC: NORMAL
PATH REPORT.GROSS SPEC: NORMAL

## 2022-07-06 ENCOUNTER — OFFICE VISIT (OUTPATIENT)
Dept: GASTROENTEROLOGY | Facility: CLINIC | Age: 77
End: 2022-07-06

## 2022-07-06 VITALS
SYSTOLIC BLOOD PRESSURE: 111 MMHG | HEIGHT: 62 IN | BODY MASS INDEX: 20.02 KG/M2 | TEMPERATURE: 96.3 F | DIASTOLIC BLOOD PRESSURE: 61 MMHG | HEART RATE: 105 BPM | WEIGHT: 108.8 LBS

## 2022-07-06 DIAGNOSIS — K22.70 BARRETT'S ESOPHAGUS WITHOUT DYSPLASIA: ICD-10-CM

## 2022-07-06 DIAGNOSIS — K90.0 CELIAC DISEASE: ICD-10-CM

## 2022-07-06 DIAGNOSIS — Z87.19 HISTORY OF GASTRITIS: ICD-10-CM

## 2022-07-06 DIAGNOSIS — K21.00 GASTROESOPHAGEAL REFLUX DISEASE WITH ESOPHAGITIS WITHOUT HEMORRHAGE: Primary | ICD-10-CM

## 2022-07-06 PROCEDURE — 99214 OFFICE O/P EST MOD 30 MIN: CPT | Performed by: NURSE PRACTITIONER

## 2022-07-06 RX ORDER — OMEPRAZOLE 40 MG/1
40 CAPSULE, DELAYED RELEASE ORAL
Qty: 60 CAPSULE | Refills: 0 | Status: SHIPPED | OUTPATIENT
Start: 2022-07-06 | End: 2022-07-06 | Stop reason: SDUPTHER

## 2022-07-06 RX ORDER — OMEPRAZOLE 40 MG/1
40 CAPSULE, DELAYED RELEASE ORAL
Qty: 180 CAPSULE | Refills: 3 | Status: SHIPPED | OUTPATIENT
Start: 2022-07-06

## 2022-07-06 NOTE — PROGRESS NOTES
Chief Complaint   Patient presents with   • Heartburn       Елена Beach is a  77 y.o. female here for a follow up visit for GERD.    HPI  77-year-old female presents today for follow-up visit for GERD.  She is a patient of Dr. Marshall.  She was last seen in the office by me on 5/3/2022.  She does have a history of celiac disease but admits that she does not follow a strict gluten-free diet.  She tells me she likes to eat sweets too much to do that.  However she continues to really complain of irregular bowel habits.  She tells me that her bowels were moving a lot better on her probiotics.  But now she is having loose stools with lots of fecal urgency.  She tells me sometimes she even has episodes of fecal incontinence.  She underwent an EGD on 6/22 that showed mild esophagitis, gastritis.  Path was positive for Levin's esophagus without dysplasia and celiac sprue.  She admits her reflux is well controlled on omeprazole 40 mg twice daily.  She denies any breakthrough reflux at this time.  She admits that she probably should try to be more gluten-free with what she is eating.  She tells me she has a boyfriend that she visits and he is not gluten-free so she tries to eat what he wants to eat.  She denies any dysphagia, reflux, abdominal pain, nausea and vomiting, rectal bleeding or melena.  She admits her appetite is good and her weight is stable.  Past Medical History:   Diagnosis Date   • Anemia    • Arthritis    • Asthma    • Back pain    • Bursitis 1995   • Celiac disease    • COPD (chronic obstructive pulmonary disease) ([facility])    • COVID-19 01/2022   • Fibromyalgia    • Fibromyositis 1995   • GERD (gastroesophageal reflux disease)    • Hernia    • Hiatal hernia    • History of pneumonia    • Hyperlipidemia    • Knee pain    • Osteoarthritis 2008   • Osteoporosis    • PONV (postoperative nausea and vomiting)    • Scoliosis 1995       Past Surgical History:   Procedure Laterality Date   • COLONOSCOPY     •  "ENDOSCOPY     • ENDOSCOPY N/A 2022    Procedure: ESOPHAGOGASTRODUODENOSCOPY with biopsies;  Surgeon: Igor Marshall MD;  Location: [facility];  Service: Gastroenterology;  Laterality: N/A;  pre- reflux, nausea, gluten enteropathy  post- mild esophagitis, gastritis   • FOOT SURGERY Right 2006   • HAND SURGERY     • SHOULDER ARTHROSCOPY  2014   • SHOULDER SURGERY Left    • TOE SURGERY Right    • TOOTH EXTRACTION     • TOTAL KNEE ARTHROPLASTY Right 2018    Procedure: TOTAL KNEE ARTHROPLASTY;  Surgeon: Gerry Estevez MD;  Location: [facility] MAIN OR;  Service: Orthopedics   • TUBAL ABDOMINAL LIGATION     • UPPER GASTROINTESTINAL ENDOSCOPY     • WRIST SURGERY         Scheduled Meds:    Continuous Infusions:No current facility-administered medications for this visit.      PRN Meds:.    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   • Cephalosporins Irritability     Mental changes   • Ibuprofen Other (See Comments)     Can't take due to hiatal hernia   • Pseudoephedrine Other (See Comments)     Heart palpitations   • Codeine Other (See Comments)     Keeps awake   • Keflex [Cephalexin] Other (See Comments)     Makes feel \"goofy\"       Social History     Socioeconomic History   • Marital status: Single   • Number of children: 1   Tobacco Use   • Smoking status: Former Smoker     Packs/day: 1.00     Years: 40.00     Pack years: 40.00     Types: Cigarettes, Cigarettes     Quit date: 2016     Years since quittin.6   • Smokeless tobacco: Never Used   Vaping Use   • Vaping Use: Never used   Substance and Sexual Activity   • Alcohol use: Yes     Alcohol/week: 1.0 standard drink     Types: 1 Shots of liquor per week     Comment: occ   • Drug use: No   • Sexual activity: Not Currently     Partners: Male     Birth control/protection: Other       Family " History   Problem Relation Age of Onset   • Heart failure Mother    • Hypertension Mother    • Cancer Mother         breast   • Cancer Father         prostate   • Malig Hyperthermia Neg Hx        Review of Systems   Constitutional: Negative for appetite change, chills, diaphoresis, fatigue, fever and unexpected weight change.   HENT: Negative for nosebleeds, postnasal drip, sore throat, trouble swallowing and voice change.    Respiratory: Negative for cough, choking, chest tightness, shortness of breath, wheezing and stridor.    Cardiovascular: Negative for chest pain, palpitations and leg swelling.   Gastrointestinal: Positive for abdominal distention and diarrhea. Negative for abdominal pain, anal bleeding, blood in stool, constipation, nausea, rectal pain and vomiting.   Endocrine: Negative for polydipsia, polyphagia and polyuria.   Musculoskeletal: Negative for gait problem.   Skin: Negative for rash and wound.   Allergic/Immunologic: Negative for food allergies.   Neurological: Negative for dizziness, speech difficulty and light-headedness.   Psychiatric/Behavioral: Negative for confusion, self-injury, sleep disturbance and suicidal ideas.       Vitals:    07/06/22 1004   BP: 111/61   Pulse: 105   Temp: 96.3 °F (35.7 °C)       Physical Exam  Constitutional:       General: She is not in acute distress.     Appearance: She is well-developed. She is not ill-appearing.   HENT:      Head: Normocephalic.   Eyes:      Pupils: Pupils are equal, round, and reactive to light.   Cardiovascular:      Rate and Rhythm: Normal rate and regular rhythm.      Heart sounds: Normal heart sounds.   Pulmonary:      Effort: Pulmonary effort is normal.      Breath sounds: Normal breath sounds.   Abdominal:      General: Bowel sounds are normal. There is no distension.      Palpations: Abdomen is soft. There is no mass.      Tenderness: There is no abdominal tenderness. There is no guarding or rebound.      Hernia: No hernia is  present.   Musculoskeletal:         General: Normal range of motion.   Skin:     General: Skin is warm and dry.   Neurological:      Mental Status: She is alert and oriented to person, place, and time.   Psychiatric:         Speech: Speech normal.         Behavior: Behavior normal.         Judgment: Judgment normal.         No radiology results for the last 7 days     Diagnoses and all orders for this visit:    1. Gastroesophageal reflux disease with esophagitis without hemorrhage (Primary)    2. Levin's esophagus without dysplasia    3. History of gastritis    4. Celiac disease    Other orders  -     Discontinue: omeprazole (priLOSEC) 40 MG capsule; Take 1 capsule by mouth 2 (Two) Times a Day Before Meals.  Dispense: 60 capsule; Refill: 0  -     omeprazole (priLOSEC) 40 MG capsule; Take 1 capsule by mouth 2 (Two) Times a Day Before Meals.  Dispense: 180 capsule; Refill: 3       Reviewed EGD results with her today.  Biopsies did show gastritis with an area of Levin's esophagus without dysplasia.  Continue omeprazole 40 mg twice daily since we had just increased it prior to her EGD.  I really think she would do so much better if she maintained a gluten-free diet.  I think this would not only help her reflux symptoms but also her loose stools with fecal urgency.  Continue daily probiotics.  Gluten-free handout given to her today.  Patient to call the office next week with an update.  Patient to follow-up with me in 3 months.  Patient is agreeable to the plan.

## 2022-07-12 ENCOUNTER — HOSPITAL ENCOUNTER (OUTPATIENT)
Dept: CT IMAGING | Facility: HOSPITAL | Age: 77
Discharge: HOME | End: 2022-07-12
Admitting: INTERNAL MEDICINE

## 2022-07-12 ENCOUNTER — TELEPHONE (OUTPATIENT)
Dept: GASTROENTEROLOGY | Facility: CLINIC | Age: 77
End: 2022-07-12

## 2022-07-12 DIAGNOSIS — R91.1 PULMONARY NODULE: ICD-10-CM

## 2022-07-12 PROCEDURE — 71250 CT THORAX DX C-: CPT

## 2022-07-12 NOTE — TELEPHONE ENCOUNTER
Results given with o/v of 7/6.     EGD for 6/22/25 placed in recall and HM.  O/v fo 6/22/23 placed in recall.

## 2022-07-12 NOTE — TELEPHONE ENCOUNTER
Caller: Елена Beach    Relationship to patient: Self    Best call back number: [phone]    Patient is needing: PT IS BEING ASKED TO BE REFERRED TO NUTRITIONIST  PT WOULD LIKE TO KNOW WHAT SHE CAN GET WITH HER GERD, CELIC

## 2022-07-12 NOTE — TELEPHONE ENCOUNTER
----- Message from Igor AGUILAR MD sent at 6/28/2022  6:46 AM EDT -----  Regarding: Biopsy results  Okay to call results, continue gluten-free diet.  Maintain current medications.  Needs follow-up EGD in 3 years.  Office follow-up annually or call sooner as needed.  ----- Message -----  From: Lab, Background User  Sent: 6/23/2022   5:47 PM EDT  To: Igor AGUILAR MD

## 2022-07-13 NOTE — TELEPHONE ENCOUNTER
Called pt and pt is wanting to see a nutritionist regarding eating gluten free diet. Advised most insurances do not cover this .  Advised that she can see a nutritionist and is usually costs approx $80 and usually requires one visit.  Advised we can mail her a list of nutritionists that she can call. Pt verb understanding and does want list mailed to her home address.  List mailed.   Update sent to Hedy HOBBS.

## 2022-07-20 ENCOUNTER — OFFICE VISIT (OUTPATIENT)
Dept: FAMILY MEDICINE CLINIC | Facility: CLINIC | Age: 77
End: 2022-07-20

## 2022-07-20 VITALS
DIASTOLIC BLOOD PRESSURE: 76 MMHG | OXYGEN SATURATION: 97 % | HEIGHT: 62 IN | WEIGHT: 106 LBS | TEMPERATURE: 97.3 F | SYSTOLIC BLOOD PRESSURE: 126 MMHG | HEART RATE: 83 BPM | BODY MASS INDEX: 19.51 KG/M2

## 2022-07-20 DIAGNOSIS — M25.571 PAIN IN JOINTS OF BOTH FEET: ICD-10-CM

## 2022-07-20 DIAGNOSIS — F41.9 ANXIETY: ICD-10-CM

## 2022-07-20 DIAGNOSIS — R25.2 MUSCLE CRAMPS: Primary | ICD-10-CM

## 2022-07-20 DIAGNOSIS — K90.0 CELIAC DISEASE: ICD-10-CM

## 2022-07-20 DIAGNOSIS — M25.572 PAIN IN JOINTS OF BOTH FEET: ICD-10-CM

## 2022-07-20 DIAGNOSIS — M25.541 JOINT PAIN IN BOTH HANDS: ICD-10-CM

## 2022-07-20 DIAGNOSIS — M25.542 JOINT PAIN IN BOTH HANDS: ICD-10-CM

## 2022-07-20 DIAGNOSIS — Z79.899 ON STATIN THERAPY: ICD-10-CM

## 2022-07-20 PROCEDURE — 99214 OFFICE O/P EST MOD 30 MIN: CPT | Performed by: NURSE PRACTITIONER

## 2022-07-20 RX ORDER — ARFORMOTEROL TARTRATE 15 UG/2ML
SOLUTION RESPIRATORY (INHALATION)
COMMUNITY
Start: 2022-06-21

## 2022-07-20 RX ORDER — BUSPIRONE HYDROCHLORIDE 10 MG/1
10 TABLET ORAL 2 TIMES DAILY
Qty: 180 TABLET | Refills: 1 | Status: SHIPPED | OUTPATIENT
Start: 2022-07-20 | End: 2023-01-23
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
## 2022-07-21 PROBLEM — K90.0 CELIAC DISEASE: Status: ACTIVE | Noted: 2022-07-21

## 2022-07-21 PROBLEM — F41.9 ANXIETY: Status: ACTIVE | Noted: 2022-07-21

## 2022-07-21 NOTE — ASSESSMENT & PLAN NOTE
Patient will continue to follow with gastroenterology.  She was given a list of foods that are part of the celiac diet and foods that are not.  Patient is also going to see a nutritionist.

## 2022-07-21 NOTE — ASSESSMENT & PLAN NOTE
Patient was started on buspirone by her pulmonologist as she was having some feelings of shortness of breath that the pulmonologist associated with anxiety.  I do feel as if patient can increase the dose since she is feeling more anxious regarding her new diet changes and life circumstances.  Patient will take buspirone 10 mg 2 times a day.

## 2022-07-23 LAB
ALBUMIN SERPL-MCNC: 4.4 G/DL (ref 3.7–4.7)
ALBUMIN/GLOB SERPL: 1.5 {RATIO} (ref 1.2–2.2)
ALP SERPL-CCNC: 144 IU/L (ref 44–121)
ALT SERPL-CCNC: 21 IU/L (ref 0–32)
AST SERPL-CCNC: 34 IU/L (ref 0–40)
BILIRUB SERPL-MCNC: 0.4 MG/DL (ref 0–1.2)
BUN SERPL-MCNC: 7 MG/DL (ref 8–27)
BUN/CREAT SERPL: 12 (ref 12–28)
CALCIUM SERPL-MCNC: 10 MG/DL (ref 8.7–10.3)
CCP IGA+IGG SERPL IA-ACNC: 9 UNITS (ref 0–19)
CHLORIDE SERPL-SCNC: 98 MMOL/L (ref 96–106)
CK SERPL-CCNC: 76 U/L (ref 32–182)
CO2 SERPL-SCNC: 25 MMOL/L (ref 20–29)
CREAT SERPL-MCNC: 0.57 MG/DL (ref 0.57–1)
CRP SERPL-MCNC: <1 MG/L (ref 0–10)
EGFRCR SERPLBLD CKD-EPI 2021: 94 ML/MIN/1.73
ERYTHROCYTE [SEDIMENTATION RATE] IN BLOOD BY WESTERGREN METHOD: 20 MM/HR (ref 0–40)
FOLATE SERPL-MCNC: >20 NG/ML
GLOBULIN SER CALC-MCNC: 2.9 G/DL (ref 1.5–4.5)
GLUCOSE SERPL-MCNC: 96 MG/DL (ref 65–99)
MAGNESIUM SERPL-MCNC: 2.2 MG/DL (ref 1.6–2.3)
POTASSIUM SERPL-SCNC: 4.3 MMOL/L (ref 3.5–5.2)
PROT SERPL-MCNC: 7.3 G/DL (ref 6–8.5)
RHEUMATOID FACT SERPL-ACNC: 10.9 IU/ML
SODIUM SERPL-SCNC: 139 MMOL/L (ref 134–144)
VIT B12 SERPL-MCNC: 969 PG/ML (ref 232–1245)

## 2022-07-27 ENCOUNTER — PATIENT ROUNDING (BHMG ONLY) (OUTPATIENT)
Dept: FAMILY MEDICINE CLINIC | Facility: CLINIC | Age: 77
End: 2022-07-27

## 2022-07-27 NOTE — PROGRESS NOTES
July 27, 2022    Hello, may I speak with Елена Beach?    My name is Cindy       [facility] am  [facility]  [address] [place] [id number] [phone].    Before we get started may I verify your date of birth? 1945    I am calling to officially welcome you to our practice and ask about your recent visit. Is this a good time to talk? No sent my chart message

## 2022-08-03 ENCOUNTER — HOSPITAL ENCOUNTER (OUTPATIENT)
Dept: BONE DENSITY | Facility: HOSPITAL | Age: 77
Discharge: HOME | End: 2022-08-03

## 2022-08-03 ENCOUNTER — HOSPITAL ENCOUNTER (OUTPATIENT)
Dept: MAMMOGRAPHY | Facility: HOSPITAL | Age: 77
Discharge: HOME | End: 2022-08-03

## 2022-08-03 DIAGNOSIS — Z92.89 HISTORY OF MAMMOGRAPHY, SCREENING: ICD-10-CM

## 2022-08-03 PROCEDURE — 77067 SCR MAMMO BI INCL CAD: CPT

## 2022-08-03 PROCEDURE — 77080 DXA BONE DENSITY AXIAL: CPT

## 2022-08-03 PROCEDURE — 77063 BREAST TOMOSYNTHESIS BI: CPT

## 2022-08-25 ENCOUNTER — HOSPITAL ENCOUNTER (EMERGENCY)
Facility: HOSPITAL | Age: 77
Discharge: HOME | End: 2022-08-25
Attending: EMERGENCY MEDICINE | Admitting: EMERGENCY MEDICINE

## 2022-08-25 ENCOUNTER — APPOINTMENT (OUTPATIENT)
Dept: GENERAL RADIOLOGY | Facility: HOSPITAL | Age: 77
End: 2022-08-25

## 2022-08-25 VITALS
SYSTOLIC BLOOD PRESSURE: 117 MMHG | BODY MASS INDEX: 17.85 KG/M2 | HEIGHT: 62 IN | WEIGHT: 97 LBS | HEART RATE: 81 BPM | OXYGEN SATURATION: 93 % | TEMPERATURE: 97.8 F | RESPIRATION RATE: 18 BRPM | DIASTOLIC BLOOD PRESSURE: 62 MMHG

## 2022-08-25 DIAGNOSIS — R50.9 FEVER, UNSPECIFIED FEVER CAUSE: Primary | ICD-10-CM

## 2022-08-25 DIAGNOSIS — J44.9 CHRONIC OBSTRUCTIVE PULMONARY DISEASE, UNSPECIFIED COPD TYPE: ICD-10-CM

## 2022-08-25 DIAGNOSIS — J18.9 PNEUMONIA DUE TO INFECTIOUS ORGANISM, UNSPECIFIED LATERALITY, UNSPECIFIED PART OF LUNG: ICD-10-CM

## 2022-08-25 DIAGNOSIS — R05.9 COUGH: ICD-10-CM

## 2022-08-25 LAB
ALBUMIN SERPL-MCNC: 3.6 G/DL (ref 3.5–5.2)
ALBUMIN/GLOB SERPL: 1.2 G/DL
ALP SERPL-CCNC: 106 U/L (ref 39–117)
ALT SERPL W P-5'-P-CCNC: 12 U/L (ref 1–33)
ANION GAP SERPL CALCULATED.3IONS-SCNC: 11.3 MMOL/L (ref 5–15)
AST SERPL-CCNC: 16 U/L (ref 1–32)
B PARAPERT DNA SPEC QL NAA+PROBE: NOT DETECTED
B PERT DNA SPEC QL NAA+PROBE: NOT DETECTED
BASOPHILS # BLD AUTO: 0.03 10*3/MM3 (ref 0–0.2)
BASOPHILS NFR BLD AUTO: 0.4 % (ref 0–1.5)
BILIRUB SERPL-MCNC: 0.6 MG/DL (ref 0–1.2)
BUN SERPL-MCNC: 10 MG/DL (ref 8–23)
BUN/CREAT SERPL: 17.5 (ref 7–25)
C PNEUM DNA NPH QL NAA+NON-PROBE: NOT DETECTED
CALCIUM SPEC-SCNC: 9.1 MG/DL (ref 8.6–10.5)
CHLORIDE SERPL-SCNC: 98 MMOL/L (ref 98–107)
CO2 SERPL-SCNC: 22.7 MMOL/L (ref 22–29)
CREAT SERPL-MCNC: 0.57 MG/DL (ref 0.57–1)
D-LACTATE SERPL-SCNC: 1.4 MMOL/L (ref 0.5–2)
DEPRECATED RDW RBC AUTO: 39.8 FL (ref 37–54)
EGFRCR SERPLBLD CKD-EPI 2021: 94.3 ML/MIN/1.73
EOSINOPHIL # BLD AUTO: 0.07 10*3/MM3 (ref 0–0.4)
EOSINOPHIL NFR BLD AUTO: 0.8 % (ref 0.3–6.2)
ERYTHROCYTE [DISTWIDTH] IN BLOOD BY AUTOMATED COUNT: 11.9 % (ref 12.3–15.4)
FLUAV SUBTYP SPEC NAA+PROBE: NOT DETECTED
FLUBV RNA ISLT QL NAA+PROBE: NOT DETECTED
GLOBULIN UR ELPH-MCNC: 3 GM/DL
GLUCOSE SERPL-MCNC: 195 MG/DL (ref 65–99)
HADV DNA SPEC NAA+PROBE: NOT DETECTED
HCOV 229E RNA SPEC QL NAA+PROBE: NOT DETECTED
HCOV HKU1 RNA SPEC QL NAA+PROBE: NOT DETECTED
HCOV NL63 RNA SPEC QL NAA+PROBE: NOT DETECTED
HCOV OC43 RNA SPEC QL NAA+PROBE: NOT DETECTED
HCT VFR BLD AUTO: 35.1 % (ref 34–46.6)
HGB BLD-MCNC: 12.2 G/DL (ref 12–15.9)
HMPV RNA NPH QL NAA+NON-PROBE: NOT DETECTED
HOLD SPECIMEN: NORMAL
HOLD SPECIMEN: NORMAL
HPIV1 RNA ISLT QL NAA+PROBE: NOT DETECTED
HPIV2 RNA SPEC QL NAA+PROBE: NOT DETECTED
HPIV3 RNA NPH QL NAA+PROBE: NOT DETECTED
HPIV4 P GENE NPH QL NAA+PROBE: NOT DETECTED
IMM GRANULOCYTES # BLD AUTO: 0.02 10*3/MM3 (ref 0–0.05)
IMM GRANULOCYTES NFR BLD AUTO: 0.2 % (ref 0–0.5)
LYMPHOCYTES # BLD AUTO: 0.52 10*3/MM3 (ref 0.7–3.1)
LYMPHOCYTES NFR BLD AUTO: 6.1 % (ref 19.6–45.3)
M PNEUMO IGG SER IA-ACNC: NOT DETECTED
MCH RBC QN AUTO: 31.4 PG (ref 26.6–33)
MCHC RBC AUTO-ENTMCNC: 34.8 G/DL (ref 31.5–35.7)
MCV RBC AUTO: 90.5 FL (ref 79–97)
MONOCYTES # BLD AUTO: 0.68 10*3/MM3 (ref 0.1–0.9)
MONOCYTES NFR BLD AUTO: 8 % (ref 5–12)
NEUTROPHILS NFR BLD AUTO: 7.17 10*3/MM3 (ref 1.7–7)
NEUTROPHILS NFR BLD AUTO: 84.5 % (ref 42.7–76)
NRBC BLD AUTO-RTO: 0 /100 WBC (ref 0–0.2)
NT-PROBNP SERPL-MCNC: 35.4 PG/ML (ref 0–1800)
PLATELET # BLD AUTO: 257 10*3/MM3 (ref 140–450)
PMV BLD AUTO: 9.2 FL (ref 6–12)
POTASSIUM SERPL-SCNC: 3.4 MMOL/L (ref 3.5–5.2)
PROCALCITONIN SERPL-MCNC: 0.4 NG/ML (ref 0–0.25)
PROT SERPL-MCNC: 6.6 G/DL (ref 6–8.5)
QT INTERVAL: 338 MS
RBC # BLD AUTO: 3.88 10*6/MM3 (ref 3.77–5.28)
RHINOVIRUS RNA SPEC NAA+PROBE: NOT DETECTED
RSV RNA NPH QL NAA+NON-PROBE: NOT DETECTED
SARS-COV-2 RNA NPH QL NAA+NON-PROBE: NOT DETECTED
SODIUM SERPL-SCNC: 132 MMOL/L (ref 136–145)
TROPONIN T SERPL-MCNC: <0.01 NG/ML (ref 0–0.03)
WBC NRBC COR # BLD: 8.49 10*3/MM3 (ref 3.4–10.8)
WHOLE BLOOD HOLD COAG: NORMAL
WHOLE BLOOD HOLD SPECIMEN: NORMAL

## 2022-08-25 PROCEDURE — 84484 ASSAY OF TROPONIN QUANT: CPT | Performed by: EMERGENCY MEDICINE

## 2022-08-25 PROCEDURE — 0202U NFCT DS 22 TRGT SARS-COV-2: CPT | Performed by: EMERGENCY MEDICINE

## 2022-08-25 PROCEDURE — 71045 X-RAY EXAM CHEST 1 VIEW: CPT

## 2022-08-25 PROCEDURE — 80053 COMPREHEN METABOLIC PANEL: CPT | Performed by: EMERGENCY MEDICINE

## 2022-08-25 PROCEDURE — 83880 ASSAY OF NATRIURETIC PEPTIDE: CPT | Performed by: EMERGENCY MEDICINE

## 2022-08-25 PROCEDURE — 85025 COMPLETE CBC W/AUTO DIFF WBC: CPT | Performed by: EMERGENCY MEDICINE

## 2022-08-25 PROCEDURE — 83605 ASSAY OF LACTIC ACID: CPT | Performed by: EMERGENCY MEDICINE

## 2022-08-25 PROCEDURE — 99284 EMERGENCY DEPT VISIT MOD MDM: CPT

## 2022-08-25 PROCEDURE — 93010 ELECTROCARDIOGRAM REPORT: CPT | Performed by: INTERNAL MEDICINE

## 2022-08-25 PROCEDURE — 84145 PROCALCITONIN (PCT): CPT | Performed by: EMERGENCY MEDICINE

## 2022-08-25 PROCEDURE — 93005 ELECTROCARDIOGRAM TRACING: CPT | Performed by: EMERGENCY MEDICINE

## 2022-08-25 RX ORDER — DOXYCYCLINE 100 MG/1
100 CAPSULE ORAL 2 TIMES DAILY
Qty: 20 CAPSULE | Refills: 0 | Status: SHIPPED | OUTPATIENT
Start: 2022-08-25 | End: 2022-09-04

## 2022-08-25 RX ORDER — DOXYCYCLINE 100 MG/1
100 CAPSULE ORAL ONCE
Status: COMPLETED | OUTPATIENT
Start: 2022-08-25 | End: 2022-08-25

## 2022-08-25 RX ORDER — ACETAMINOPHEN 500 MG
1000 TABLET ORAL ONCE
Status: DISCONTINUED | OUTPATIENT
Start: 2022-08-25 | End: 2022-08-25 | Stop reason: HOSPADM

## 2022-08-25 RX ORDER — SODIUM CHLORIDE 0.9 % (FLUSH) 0.9 %
10 SYRINGE (ML) INJECTION AS NEEDED
Status: DISCONTINUED | OUTPATIENT
Start: 2022-08-25 | End: 2022-08-25 | Stop reason: HOSPADM

## 2022-08-25 RX ADMIN — DOXYCYCLINE 100 MG: 100 CAPSULE ORAL at 13:35

## 2022-08-30 NOTE — PROGRESS NOTES
Chief Complaint  Pneumonia (Follow up ER last week and also following up from last office visit) and Cough (Dry cough NP)    Masks/face shield/appropriate PPE were worn for the entirety of the visit by the patient, MA, and provider.     Subjective        Елена Beach presents to [facility]  History of Present Illness  Елена Beach is a 77 y.o. female who presents to the clinic today for a routine follow-up after her last office visit for leg cramps.  She is also here to follow-up on anxiety and recent ED visit.    Leg cramps: Last office visit in July, patient was complaining of intermittent leg cramps.  Patient's blood work that was performed was overall unremarkable.  Patient reports that she has not had leg cramping for a week.  She is taking potassium supplements over-the-counter.    Anxiety: Last office visit, patient's buspirone was increased to 10 mg twice daily.  Patient feels this has helped and is not as anxious since making this change in dosing.     Since her last visit, patient has been seen in the ED on 8/25/2022 for concerns of shortness of breath, chills, and productive cough.  Respiratory panel was negative.  Chest x-ray was performed that showed emphysematous changes and no pulmonary consolidation, pleural effusion, or pneumothorax.  Cardiac silhouette was within normal limits.  Pneumonia was suspected.  Patient was discharged with doxycycline.  Patient reports her shortness of breath has improved, but she continues to have a dry cough.  Sometimes she will cough something up.  Patient does have COPD and follows with her pulmonologist.  Her next appointment with pulmonology will be in October.  Patient has been monitoring her oxygen levels at home and they have ranged from 89% to 90%.  Her average oxygen is 94% to 95%.  Patient continues on her COPD medications and as needed Ventolin.    Review of Systems   Respiratory: Positive for cough. Negative for chest  "tightness, shortness of breath, wheezing and stridor.    Cardiovascular: Negative for chest pain.   Psychiatric/Behavioral: The patient is not nervous/anxious.        Objective   Vital Signs:  BP 90/60 (BP Location: Right arm, Patient Position: Sitting, Cuff Size: Adult)   Pulse 90   Temp 97.5 °F (36.4 °C)   Ht 157.5 cm (62\")   Wt 45.6 kg (100 lb 9.6 oz)   SpO2 93%   BMI 18.40 kg/m²   Estimated body mass index is 18.4 kg/m² as calculated from the following:    Height as of this encounter: 157.5 cm (62\").    Weight as of this encounter: 45.6 kg (100 lb 9.6 oz).          Physical Exam  Constitutional:       General: She is not in acute distress.     Appearance: Normal appearance. She is not ill-appearing, toxic-appearing or diaphoretic.   HENT:      Head: Normocephalic and atraumatic.   Eyes:      General: No scleral icterus.        Right eye: No discharge.         Left eye: No discharge.      Extraocular Movements: Extraocular movements intact.   Cardiovascular:      Rate and Rhythm: Normal rate and regular rhythm.   Pulmonary:      Effort: Pulmonary effort is normal. No respiratory distress.      Breath sounds: Normal breath sounds. No stridor. No wheezing or rhonchi.   Neurological:      General: No focal deficit present.      Mental Status: She is alert and oriented to person, place, and time.      Motor: No weakness.      Gait: Gait normal.   Psychiatric:         Mood and Affect: Mood normal.         Behavior: Behavior normal.         Thought Content: Thought content normal.        Result Review :    Common labs    Common Labsle 7/20/22 8/25/22 8/25/22 8/31/22     1129 1129    Glucose 96  195 (A)    BUN 7 (A)  10    Creatinine 0.57  0.57    Sodium 139  132 (A)    Potassium 4.3  3.4 (A)    Chloride 98  98    Calcium 10.0  9.1    Total Protein 7.3      Albumin 4.4  3.60    Total Bilirubin 0.4  0.6    Alkaline Phosphatase 144 (A)  106    AST (SGOT) 34  16    ALT (SGPT) 21  12    WBC  8.49     Hemoglobin  12.2  "    Hematocrit  35.1     Platelets  257     Hemoglobin A1C    5.9   (A) Abnormal value               XR Chest 1 View (08/25/2022 11:34)  ED Provider Notes by Manny Starr MD (08/25/2022 11:18)             Assessment and Plan   Diagnoses and all orders for this visit:    1. COPD with exacerbation (HCC) (Primary)  -     benzonatate (Tessalon Perles) 100 MG capsule; Take 1 capsule by mouth 3 (Three) Times a Day As Needed for Cough.  Dispense: 30 capsule; Refill: 0  -     predniSONE (DELTASONE) 20 MG tablet; Take 2 tablets by mouth Daily for 5 days.  Dispense: 10 tablet; Refill: 0    2. Cough  -     benzonatate (Tessalon Perles) 100 MG capsule; Take 1 capsule by mouth 3 (Three) Times a Day As Needed for Cough.  Dispense: 30 capsule; Refill: 0  -     predniSONE (DELTASONE) 20 MG tablet; Take 2 tablets by mouth Daily for 5 days.  Dispense: 10 tablet; Refill: 0    3. Screening for diabetes mellitus  -     POC Glycosylated Hemoglobin (Hb A1C)    4. Elevated glucose  -     POC Glycosylated Hemoglobin (Hb A1C)      Patient continues to have a cough, which is likely related to her recent pneumonia.  Patient will complete her course of doxycycline.  She has been prescribed Tessalon Perles as needed.  I have also prescribed prednisone.  Patient was advised to follow-up in 2 to 3 weeks if her symptoms persist or she develops a productive cough or fever.  Patient was advised to take the steroid in the morning with food.  Patient advised to continue to monitor her oxygen saturations.    In the hospital, patient's glucose was elevated.  She was evaluated for diabetes and hemoglobin A1c does indicate prediabetes.  We discussed limiting added sugars in the diet.  Patient has been eating ice cream at night and we discussed decreasing her ice cream intake.  Sodium and potassium are low, but this could have been due to dehydration and poor po intake.  Will reevaluate prior to next appointment.     Patient's anxiety, is currently well  controlled on her current dose of BuSpar.  Her muscle cramps are intermittent and appear to be improving.  Patient was advised to call our office if they become more persistent and we could consider restless leg medication.  Patient's blood pressure is also slightly low today.  Patient is asymptomatic.  Patient advised to maintain adequate nutrition.  She has been to see a nutritionist.  We discussed increasing fluid intake.  Patient is going to keep track of her blood pressures daily and call if consistently less than 90 systolically or less than 60 diastolically and if she experiences new symptoms.         Follow Up   Return in about 17 months (around 2/1/2024), or if symptoms worsen or fail to improve, for Medicare Wellness- fasting labs 1-2 weeks prior.  Patient was given instructions and counseling regarding her condition or for health maintenance advice. Please see specific information pulled into the AVS if appropriate.       Answers for HPI/ROS submitted by the patient on 8/25/2022  Please describe your symptoms.: Follow up  Have you had these symptoms before?: Yes  How long have you been having these symptoms?: 1-4 days  Please list any medications you are currently taking for this condition.: None  What is the primary reason for your visit?: Other    Electronically signed by BEATRIS Steele, 08/31/22, 3:18 PM EDT.

## 2022-08-31 ENCOUNTER — OFFICE VISIT (OUTPATIENT)
Dept: FAMILY MEDICINE CLINIC | Facility: CLINIC | Age: 77
End: 2022-08-31

## 2022-08-31 VITALS
BODY MASS INDEX: 18.51 KG/M2 | TEMPERATURE: 97.5 F | HEIGHT: 62 IN | DIASTOLIC BLOOD PRESSURE: 60 MMHG | HEART RATE: 90 BPM | WEIGHT: 100.6 LBS | SYSTOLIC BLOOD PRESSURE: 90 MMHG | OXYGEN SATURATION: 93 %

## 2022-08-31 DIAGNOSIS — Z13.1 SCREENING FOR DIABETES MELLITUS: ICD-10-CM

## 2022-08-31 DIAGNOSIS — R73.09 ELEVATED GLUCOSE: ICD-10-CM

## 2022-08-31 DIAGNOSIS — R05.9 COUGH: ICD-10-CM

## 2022-08-31 DIAGNOSIS — J44.1 COPD WITH EXACERBATION: Primary | ICD-10-CM

## 2022-08-31 LAB
EXPIRATION DATE: ABNORMAL
HBA1C MFR BLD: 5.9 %
Lab: ABNORMAL

## 2022-08-31 PROCEDURE — 99214 OFFICE O/P EST MOD 30 MIN: CPT | Performed by: NURSE PRACTITIONER

## 2022-08-31 PROCEDURE — 83036 HEMOGLOBIN GLYCOSYLATED A1C: CPT | Performed by: NURSE PRACTITIONER

## 2022-08-31 RX ORDER — BENZONATATE 100 MG/1
100 CAPSULE ORAL 3 TIMES DAILY PRN
Qty: 30 CAPSULE | Refills: 0 | Status: SHIPPED | OUTPATIENT
Start: 2022-08-31 | End: 2022-10-11

## 2022-08-31 RX ORDER — PREDNISONE 20 MG/1
40 TABLET ORAL DAILY
Qty: 10 TABLET | Refills: 0 | Status: SHIPPED | OUTPATIENT
Start: 2022-08-31 | End: 2022-09-05

## 2022-09-20 ENCOUNTER — IMMUNIZATION (OUTPATIENT)
Dept: VACCINE CLINIC | Facility: HOSPITAL | Age: 77
End: 2022-09-20

## 2022-09-20 DIAGNOSIS — Z23 NEED FOR VACCINATION: Primary | ICD-10-CM

## 2022-09-20 PROCEDURE — 0124A: CPT | Performed by: INTERNAL MEDICINE

## 2022-09-20 PROCEDURE — 91312 HC SARSCOV2 VAC 30MCG/0.3ML IM BIVALENT BOOSTER 12 YRS AND OLDER: CPT | Performed by: INTERNAL MEDICINE

## 2022-10-11 ENCOUNTER — OFFICE VISIT (OUTPATIENT)
Dept: GASTROENTEROLOGY | Facility: CLINIC | Age: 77
End: 2022-10-11

## 2022-10-11 VITALS
DIASTOLIC BLOOD PRESSURE: 76 MMHG | WEIGHT: 99 LBS | SYSTOLIC BLOOD PRESSURE: 117 MMHG | TEMPERATURE: 97.5 F | HEIGHT: 62 IN | BODY MASS INDEX: 18.22 KG/M2

## 2022-10-11 DIAGNOSIS — K90.0 CELIAC DISEASE: ICD-10-CM

## 2022-10-11 DIAGNOSIS — Z87.19 HISTORY OF BARRETT'S ESOPHAGUS: ICD-10-CM

## 2022-10-11 DIAGNOSIS — K21.9 GASTROESOPHAGEAL REFLUX DISEASE WITHOUT ESOPHAGITIS: Primary | ICD-10-CM

## 2022-10-11 DIAGNOSIS — K29.80 DUODENITIS: ICD-10-CM

## 2022-10-11 PROCEDURE — 99214 OFFICE O/P EST MOD 30 MIN: CPT | Performed by: NURSE PRACTITIONER

## 2022-10-11 NOTE — PROGRESS NOTES
Chief Complaint   Patient presents with   • gastroesophageal reflux disease       Елена Beach is a  77 y.o. female here for a follow up visit for GERD.    HPI  77-year-old female presents today for follow-up visit for GERD.  She is a patient of Dr. Marshall.  She was last seen in the office by me on 7/7/2022.  She has a history of GERD with esophagitis/Levin's esophagus without dysplasia and admits that she has been doing really well on omeprazole 40 mg twice daily.  She denies any breakthrough reflux at this time.  Reports her bowels are moving well.  She does try to maintain a gluten-free diet.  She is also on daily probiotics.  Last EGD was on 6/22.  Last colonoscopy was several years ago the patient cannot recall what year.  She denies any dysphagia, reflux, abdominal pain, nausea and vomiting, diarrhea, constipation, rectal bleeding or melena.  She admits her appetite is good and her weight is stable.  She will be due for surveillance EGD in 2024.  She denies any significant GI family history at this time.  Past Medical History:   Diagnosis Date   • Anemia    • Arthritis    • Asthma    • Back pain    • Bursitis 1995   • Celiac disease    • COPD (chronic obstructive pulmonary disease) ([facility])    • COVID-19 01/2022   • Fibromyalgia    • Fibromyalgia, primary    • Fibromyositis 1995   • GERD (gastroesophageal reflux disease)    • Hernia    • Hiatal hernia    • History of pneumonia    • Hyperlipidemia    • Knee pain    • Osteoarthritis 2008   • Osteoporosis    • PONV (postoperative nausea and vomiting)    • Scoliosis 1995       Past Surgical History:   Procedure Laterality Date   • COLONOSCOPY     • ENDOSCOPY     • ENDOSCOPY N/A 06/22/2022    Procedure: ESOPHAGOGASTRODUODENOSCOPY with biopsies;  Surgeon: Igor Marshall MD;  Location: [facility] ENDOSCOPY;  Service: Gastroenterology;  Laterality: N/A;  pre- reflux, nausea, gluten enteropathy  post- mild esophagitis, gastritis   • FOOT SURGERY Right 2006   •  "HAND SURGERY     • JOINT REPLACEMENT  Knee   • SHOULDER ARTHROSCOPY     • SHOULDER SURGERY Left    • TOE SURGERY Right    • TOOTH EXTRACTION     • TOTAL KNEE ARTHROPLASTY Right 2018    Procedure: TOTAL KNEE ARTHROPLASTY;  Surgeon: Gerry Estevez MD;  Location: [facility] OR;  Service: Orthopedics   • TUBAL ABDOMINAL LIGATION     • UPPER GASTROINTESTINAL ENDOSCOPY     • WRIST SURGERY         Scheduled Meds:    Continuous Infusions:No current facility-administered medications for this visit.      PRN Meds:.    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   • Cephalosporins Irritability     Mental changes   • Ibuprofen Other (See Comments)     Can't take due to hiatal hernia   • Pseudoephedrine Other (See Comments)     Heart palpitations   • Codeine Other (See Comments)     Keeps awake   • Keflex [Cephalexin] Other (See Comments)     Makes feel \"goofy\"       Social History     Socioeconomic History   • Marital status: Single   • Number of children: 1   Tobacco Use   • Smoking status: Former     Packs/day: 1.00     Years: 40.00     Pack years: 40.00     Types: Cigarettes     Quit date: 2016     Years since quittin.9   • Smokeless tobacco: Never   Vaping Use   • Vaping Use: Never used   Substance and Sexual Activity   • Alcohol use: Yes     Alcohol/week: 1.0 standard drink     Types: 1 Shots of liquor per week     Comment: occ   • Drug use: No   • Sexual activity: Not Currently     Partners: Male     Birth control/protection: Other       Family History   Problem Relation Age of Onset   • Heart failure Mother    • Hypertension Mother    • Cancer Mother         breast   • Cancer Father         prostate   • Malig Hyperthermia Neg Hx        Review of Systems   Constitutional: Negative for appetite change, chills, diaphoresis, fatigue, fever and unexpected " weight change.   HENT: Negative for nosebleeds, postnasal drip, sore throat, trouble swallowing and voice change.    Respiratory: Negative for cough, choking, chest tightness, shortness of breath, wheezing and stridor.    Cardiovascular: Negative for chest pain, palpitations and leg swelling.   Gastrointestinal: Negative for abdominal distention, abdominal pain, anal bleeding, blood in stool, constipation, diarrhea, nausea, rectal pain and vomiting.   Endocrine: Negative for polydipsia, polyphagia and polyuria.   Musculoskeletal: Negative for gait problem.   Skin: Negative for rash and wound.   Allergic/Immunologic: Negative for food allergies.   Neurological: Negative for dizziness, speech difficulty and light-headedness.   Psychiatric/Behavioral: Negative for confusion, self-injury, sleep disturbance and suicidal ideas.       Vitals:    10/11/22 1301   BP: 117/76   Temp: 97.5 °F (36.4 °C)       Physical Exam  Constitutional:       General: She is not in acute distress.     Appearance: She is well-developed. She is not ill-appearing.   HENT:      Head: Normocephalic.   Eyes:      Pupils: Pupils are equal, round, and reactive to light.   Cardiovascular:      Rate and Rhythm: Normal rate and regular rhythm.      Heart sounds: Normal heart sounds.   Pulmonary:      Effort: Pulmonary effort is normal.      Breath sounds: Normal breath sounds.   Abdominal:      General: Bowel sounds are normal. There is no distension.      Palpations: Abdomen is soft. There is no mass.      Tenderness: There is no abdominal tenderness. There is no guarding or rebound.      Hernia: No hernia is present.   Musculoskeletal:         General: Normal range of motion.   Skin:     General: Skin is warm and dry.   Neurological:      Mental Status: She is alert and oriented to person, place, and time.   Psychiatric:         Speech: Speech normal.         Behavior: Behavior normal.         Judgment: Judgment normal.         No radiology results  for the last 7 days     Diagnoses and all orders for this visit:    1. Gastroesophageal reflux disease without esophagitis (Primary)  Overview:  Added automatically from request for surgery [id number]      2. Duodenitis  Overview:  Added automatically from request for surgery [id number]      3. Celiac disease    4. History of Levin's esophagus      GERD seems well controlled on omeprazole 40 mg twice a day.  Continue GERD precautions.  She will need repeat EGD to assess Levin's in 2024.  Bowels moving well currently.  Continue gluten-free diet.  When she stays gluten-free she really does do better.  Continue daily probiotics.  Patient to call the office with any issues.  Patient to follow-up with me in 1 year.  Patient is agreeable to the plan.

## 2022-11-21 ENCOUNTER — OFFICE VISIT (OUTPATIENT)
Dept: FAMILY MEDICINE CLINIC | Facility: CLINIC | Age: 77
End: 2022-11-21

## 2022-11-21 VITALS
HEIGHT: 62 IN | SYSTOLIC BLOOD PRESSURE: 100 MMHG | BODY MASS INDEX: 17.85 KG/M2 | DIASTOLIC BLOOD PRESSURE: 74 MMHG | OXYGEN SATURATION: 96 % | WEIGHT: 97 LBS | HEART RATE: 98 BPM | TEMPERATURE: 100.4 F

## 2022-11-21 DIAGNOSIS — J44.1 COPD WITH EXACERBATION: ICD-10-CM

## 2022-11-21 DIAGNOSIS — R05.1 ACUTE COUGH: ICD-10-CM

## 2022-11-21 DIAGNOSIS — J44.1 COPD WITH EXACERBATION: Primary | ICD-10-CM

## 2022-11-21 LAB
EXPIRATION DATE: NORMAL
FLUAV AG UPPER RESP QL IA.RAPID: NOT DETECTED
FLUBV AG UPPER RESP QL IA.RAPID: NOT DETECTED
INTERNAL CONTROL: NORMAL
Lab: NORMAL
SARS-COV-2 AG UPPER RESP QL IA.RAPID: NORMAL

## 2022-11-21 PROCEDURE — 99213 OFFICE O/P EST LOW 20 MIN: CPT | Performed by: NURSE PRACTITIONER

## 2022-11-21 PROCEDURE — 87428 SARSCOV & INF VIR A&B AG IA: CPT | Performed by: NURSE PRACTITIONER

## 2022-11-21 RX ORDER — BENZONATATE 100 MG/1
100 CAPSULE ORAL 3 TIMES DAILY PRN
Qty: 30 CAPSULE | Refills: 0 | Status: CANCELLED | OUTPATIENT
Start: 2022-11-21

## 2022-11-21 RX ORDER — AZITHROMYCIN 250 MG/1
250 TABLET, FILM COATED ORAL 3 TIMES WEEKLY
COMMUNITY
Start: 2022-11-21

## 2022-11-21 RX ORDER — PREDNISONE 20 MG/1
40 TABLET ORAL DAILY
Qty: 10 TABLET | Refills: 0 | Status: SHIPPED | OUTPATIENT
Start: 2022-11-21 | End: 2022-11-26

## 2022-11-21 RX ORDER — AMOXICILLIN AND CLAVULANATE POTASSIUM 875; 125 MG/1; MG/1
1 TABLET, FILM COATED ORAL 2 TIMES DAILY
Qty: 14 TABLET | Refills: 0 | Status: SHIPPED | OUTPATIENT
Start: 2022-11-21 | End: 2022-11-28

## 2022-11-21 RX ORDER — BENZONATATE 100 MG/1
100 CAPSULE ORAL 3 TIMES DAILY PRN
Qty: 30 CAPSULE | Refills: 0 | Status: SHIPPED | OUTPATIENT
Start: 2022-11-21 | End: 2022-11-22 | Stop reason: SDUPTHER

## 2022-11-21 NOTE — PROGRESS NOTES
"Chief Complaint  Cough (Productive cough, congestion, \"tight breathing\", SOB. Some relief w cough rx. Worsening since Friday. )    Masks/face shield/appropriate PPE were worn for the entirety of the visit by the patient, MA, and provider.     Subjective        Елена Beach presents to [facility]  History of Present Illness  Елена Beach is a 77 y.o. female who presents to the clinic today with complaints of scratchy throat and nasal congestion that started about 3-4 days ago. She then developed a productive cough. She denies chills or body aches. She does have COPD and is using her nebulizer twice a day. She is drinking hot tea and honey. She has been taking azithromycin since October 3 days a week as prescribed by her pulmonologist. She is taking Mucinex daily as well as tessalon perles. She did play cards with a friend 5 days ago who recently tested positive for flu.    Review of Systems   Constitutional: Negative for chills and fever.   HENT: Positive for congestion and rhinorrhea. Negative for ear pain, postnasal drip and sore throat.    Respiratory: Positive for cough and chest tightness. Negative for shortness of breath and wheezing.    Cardiovascular: Negative for chest pain.   Musculoskeletal: Negative for myalgias.   Skin: Negative for rash.   Neurological: Negative for headaches.       Objective   Vital Signs:  /74   Pulse 98   Temp 100.4 °F (38 °C)   Ht 157.5 cm (62\")   Wt 44 kg (97 lb)   SpO2 96%   BMI 17.74 kg/m²   Estimated body mass index is 17.74 kg/m² as calculated from the following:    Height as of this encounter: 157.5 cm (62\").    Weight as of this encounter: 44 kg (97 lb).          Physical Exam  Constitutional:       General: She is not in acute distress.     Appearance: Normal appearance. She is not ill-appearing, toxic-appearing or diaphoretic.   HENT:      Head: Normocephalic and atraumatic.      Nose: Congestion present.   Eyes:      " General: No scleral icterus.        Right eye: No discharge.         Left eye: No discharge.   Cardiovascular:      Rate and Rhythm: Normal rate and regular rhythm.   Pulmonary:      Effort: Pulmonary effort is normal. No respiratory distress.      Breath sounds: No stridor. Wheezing present. No rhonchi.   Neurological:      General: No focal deficit present.      Mental Status: She is alert and oriented to person, place, and time.   Psychiatric:         Mood and Affect: Mood normal.         Behavior: Behavior normal.        Result Review :         POCT SARS-CoV-2 Antigen AGUSTIN + Flu (11/21/2022 15:45)           Assessment and Plan   Diagnoses and all orders for this visit:    1. COPD with exacerbation (HCC) (Primary)  -     Discontinue: benzonatate (TESSALON) 100 MG capsule; Take 1 capsule by mouth 3 (Three) Times a Day As Needed for Cough.  Dispense: 30 capsule; Refill: 0  -     predniSONE (DELTASONE) 20 MG tablet; Take 2 tablets by mouth Daily for 5 days.  Dispense: 10 tablet; Refill: 0  -     amoxicillin-clavulanate (Augmentin) 875-125 MG per tablet; Take 1 tablet by mouth 2 (Two) Times a Day for 7 days.  Dispense: 14 tablet; Refill: 0    2. Acute cough  -     POCT SARS-CoV-2 Antigen AGUSTIN + Flu  -     Discontinue: benzonatate (TESSALON) 100 MG capsule; Take 1 capsule by mouth 3 (Three) Times a Day As Needed for Cough.  Dispense: 30 capsule; Refill: 0  -     predniSONE (DELTASONE) 20 MG tablet; Take 2 tablets by mouth Daily for 5 days.  Dispense: 10 tablet; Refill: 0  -     amoxicillin-clavulanate (Augmentin) 875-125 MG per tablet; Take 1 tablet by mouth 2 (Two) Times a Day for 7 days.  Dispense: 14 tablet; Refill: 0      Advised Augmentin can cause stomach upset and encouraged to take with food. She reports tolerating this well in the past.          Follow Up   Return if symptoms worsen or fail to improve.  Patient was given instructions and counseling regarding her condition or for health maintenance advice.  Please see specific information pulled into the AVS if appropriate.       Answers for HPI/ROS submitted by the patient on 11/21/2022  What is the primary reason for your visit?: Cough    Electronically signed by BEATRIS Steele, 11/27/22, 4:16 PM EST.

## 2022-11-22 ENCOUNTER — TELEPHONE (OUTPATIENT)
Dept: FAMILY MEDICINE CLINIC | Facility: CLINIC | Age: 77
End: 2022-11-22

## 2022-11-22 DIAGNOSIS — J44.1 COPD WITH EXACERBATION: ICD-10-CM

## 2022-11-22 DIAGNOSIS — R05.1 ACUTE COUGH: ICD-10-CM

## 2022-11-22 RX ORDER — BENZONATATE 100 MG/1
100 CAPSULE ORAL 3 TIMES DAILY PRN
Qty: 30 CAPSULE | Refills: 0 | Status: SHIPPED | OUTPATIENT
Start: 2022-11-22 | End: 2022-12-06

## 2022-11-22 NOTE — TELEPHONE ENCOUNTER
Pt requested an update on the status of the tessalon pearls that have been prescribed to her. She explained that each medication has gone through except for this.

## 2022-12-06 DIAGNOSIS — R05.1 ACUTE COUGH: ICD-10-CM

## 2022-12-06 DIAGNOSIS — J44.1 COPD WITH EXACERBATION: ICD-10-CM

## 2022-12-06 RX ORDER — BENZONATATE 100 MG/1
CAPSULE ORAL
Qty: 30 CAPSULE | Refills: 0 | Status: SHIPPED | OUTPATIENT
Start: 2022-12-06 | End: 2023-02-02

## 2022-12-29 RX ORDER — AZELASTINE HYDROCHLORIDE 137 UG/1
SPRAY, METERED NASAL
Qty: 90 ML | Refills: 0 | Status: SHIPPED | OUTPATIENT
Start: 2022-12-29

## 2023-01-09 DIAGNOSIS — E78.5 HYPERLIPIDEMIA, UNSPECIFIED HYPERLIPIDEMIA TYPE: ICD-10-CM

## 2023-01-09 RX ORDER — ATORVASTATIN CALCIUM 10 MG/1
TABLET, FILM COATED ORAL
Qty: 90 TABLET | Refills: 0 | Status: SHIPPED | OUTPATIENT
Start: 2023-01-09

## 2023-01-23 DIAGNOSIS — F41.9 ANXIETY: ICD-10-CM

## 2023-01-23 RX ORDER — BUSPIRONE HYDROCHLORIDE 10 MG/1
TABLET ORAL
Qty: 180 TABLET | Refills: 1 | Status: SHIPPED | OUTPATIENT
Start: 2023-01-23

## 2023-01-23 NOTE — TELEPHONE ENCOUNTER
Rx Refill Note  Requested Prescriptions     Pending Prescriptions Disp Refills   • busPIRone (BUSPAR) 10 MG tablet [Pharmacy Med Name: BUSPIRONE HYDROCHLORIDE 10 MG Tablet] 180 tablet 1     Sig: TAKE 1 TABLET TWICE DAILY      Last office visit with prescribing clinician: 11/21/2022   Last telemedicine visit with prescribing clinician: 1/30/2023   Next office visit with prescribing clinician: 2/2/2023                         Would you like a call back once the refill request has been completed: [] Yes [] No    If the office needs to give you a call back, can they leave a voicemail: [] Yes [] No    Anne Islas MA/LMR  01/23/23, 12:57 EST

## 2023-01-30 DIAGNOSIS — E78.5 HYPERLIPIDEMIA, UNSPECIFIED HYPERLIPIDEMIA TYPE: Primary | ICD-10-CM

## 2023-01-30 DIAGNOSIS — Z13.1 SCREENING FOR DIABETES MELLITUS: ICD-10-CM

## 2023-01-30 DIAGNOSIS — R73.09 ELEVATED GLUCOSE: ICD-10-CM

## 2023-01-30 DIAGNOSIS — Z13.0 SCREENING FOR IRON DEFICIENCY ANEMIA: ICD-10-CM

## 2023-01-30 DIAGNOSIS — Z00.00 MEDICARE ANNUAL WELLNESS VISIT, SUBSEQUENT: ICD-10-CM

## 2023-01-31 LAB
ALBUMIN SERPL-MCNC: 4.4 G/DL (ref 3.5–5.2)
ALBUMIN/GLOB SERPL: 1.8 G/DL
ALP SERPL-CCNC: 117 U/L (ref 39–117)
ALT SERPL-CCNC: 17 U/L (ref 1–33)
AST SERPL-CCNC: 23 U/L (ref 1–32)
BASOPHILS # BLD AUTO: 0.05 10*3/MM3 (ref 0–0.2)
BASOPHILS NFR BLD AUTO: 0.9 % (ref 0–1.5)
BILIRUB SERPL-MCNC: 0.3 MG/DL (ref 0–1.2)
BUN SERPL-MCNC: 6 MG/DL (ref 8–23)
BUN/CREAT SERPL: 9.4 (ref 7–25)
CALCIUM SERPL-MCNC: 9.8 MG/DL (ref 8.6–10.5)
CHLORIDE SERPL-SCNC: 97 MMOL/L (ref 98–107)
CHOLEST SERPL-MCNC: 162 MG/DL (ref 0–200)
CO2 SERPL-SCNC: 32.4 MMOL/L (ref 22–29)
CREAT SERPL-MCNC: 0.64 MG/DL (ref 0.57–1)
EGFRCR SERPLBLD CKD-EPI 2021: 91.7 ML/MIN/1.73
EOSINOPHIL # BLD AUTO: 0.57 10*3/MM3 (ref 0–0.4)
EOSINOPHIL NFR BLD AUTO: 10.1 % (ref 0.3–6.2)
ERYTHROCYTE [DISTWIDTH] IN BLOOD BY AUTOMATED COUNT: 11.7 % (ref 12.3–15.4)
GLOBULIN SER CALC-MCNC: 2.4 GM/DL
GLUCOSE SERPL-MCNC: 91 MG/DL (ref 65–99)
HBA1C MFR BLD: 5.9 % (ref 4.8–5.6)
HCT VFR BLD AUTO: 40.9 % (ref 34–46.6)
HDLC SERPL-MCNC: 75 MG/DL (ref 40–60)
HGB BLD-MCNC: 13.8 G/DL (ref 12–15.9)
IMM GRANULOCYTES # BLD AUTO: 0.02 10*3/MM3 (ref 0–0.05)
IMM GRANULOCYTES NFR BLD AUTO: 0.4 % (ref 0–0.5)
LDLC SERPL CALC-MCNC: 72 MG/DL (ref 0–100)
LYMPHOCYTES # BLD AUTO: 1.56 10*3/MM3 (ref 0.7–3.1)
LYMPHOCYTES NFR BLD AUTO: 27.8 % (ref 19.6–45.3)
MCH RBC QN AUTO: 31.7 PG (ref 26.6–33)
MCHC RBC AUTO-ENTMCNC: 33.7 G/DL (ref 31.5–35.7)
MCV RBC AUTO: 93.8 FL (ref 79–97)
MONOCYTES # BLD AUTO: 0.5 10*3/MM3 (ref 0.1–0.9)
MONOCYTES NFR BLD AUTO: 8.9 % (ref 5–12)
NEUTROPHILS # BLD AUTO: 2.92 10*3/MM3 (ref 1.7–7)
NEUTROPHILS NFR BLD AUTO: 51.9 % (ref 42.7–76)
NRBC BLD AUTO-RTO: 0 /100 WBC (ref 0–0.2)
PLATELET # BLD AUTO: 326 10*3/MM3 (ref 140–450)
POTASSIUM SERPL-SCNC: 4 MMOL/L (ref 3.5–5.2)
PROT SERPL-MCNC: 6.8 G/DL (ref 6–8.5)
RBC # BLD AUTO: 4.36 10*6/MM3 (ref 3.77–5.28)
SODIUM SERPL-SCNC: 135 MMOL/L (ref 136–145)
TRIGL SERPL-MCNC: 79 MG/DL (ref 0–150)
VLDLC SERPL CALC-MCNC: 15 MG/DL (ref 5–40)
WBC # BLD AUTO: 5.62 10*3/MM3 (ref 3.4–10.8)

## 2023-02-01 NOTE — PROGRESS NOTES
The ABCs of the Annual Wellness Visit  Subsequent Medicare Wellness Visit    Masks/face shield/appropriate PPE were worn for the entirety of the visit by the patient, MA, and provider.     Chief Complaint   Patient presents with   • Medicare Wellness-subsequent     SMW - labs 1/30/23 - VIDHI 0 PHQ 1      Subjective    History of Present Illness:  Елена Beach is a 77 y.o. female who presents for a Subsequent Medicare Wellness Visit.  Patient has a history of GERD, celiac disease, hyperlipidemia, anemia, COPD, osteopenia, fibromyalgia, and anxiety.    The following portions of the patient's history were reviewed and   updated as appropriate: allergies, current medications, past family history, past medical history, past social history, past surgical history and problem list.    Compared to one year ago, the patient feels her physical   health is the same.    Compared to one year ago, the patient feels her mental   health is the same.    Recent Hospitalizations:  She was not admitted to the hospital during the last year.       Current Medical Providers:  Patient Care Team:  Denisse Edwards APRN as PCP - General (Nurse Practitioner)  Izabel Villa MD as Consulting Physician (Pulmonary Disease)    Outpatient Medications Prior to Visit   Medication Sig Dispense Refill   • arformoterol (BROVANA) 15 MCG/2ML nebulizer solution USE 1 VIAL IN NEBULIZER TWICE DAILY     • atorvastatin (LIPITOR) 10 MG tablet TAKE 1 TABLET EVERY NIGHT 90 tablet 0   • azithromycin (ZITHROMAX) 250 MG tablet Take 250 mg by mouth 3 (Three) Times a Week.     • budesonide (PULMICORT) 0.5 MG/2ML nebulizer solution Take 0.5 mg by nebulization Daily.     • busPIRone (BUSPAR) 10 MG tablet TAKE 1 TABLET TWICE DAILY 180 tablet 1   • Calcium Citrate-Vitamin D (CALCIUM CITRATE + D PO) Take 1 tablet by mouth Daily.     • Ferrous Gluconate (IRON 27 PO) Take 1 tablet by mouth Every Other Day.     • glucosamine sulfate 500 MG capsule capsule Take  by mouth  Daily.     • Multiple Vitamins-Minerals (MULTIVITAMIN ADULT PO) Take 1 tablet by mouth Daily.     • omeprazole (priLOSEC) 40 MG capsule Take 1 capsule by mouth 2 (Two) Times a Day Before Meals. 180 capsule 3   • probiotic (CULTURELLE) capsule capsule Take  by mouth Daily.     • Azelastine HCl 137 MCG/SPRAY solution USE 1 TO 2 SPRAY(S) IN EACH NOSTRIL TWICE DAILY 90 mL 0   • Melatonin 10 MG capsule Take  by mouth.     • benzonatate (TESSALON) 100 MG capsule Take 1 capsule by mouth three times daily as needed for cough 30 capsule 0     No facility-administered medications prior to visit.       No opioid medication identified on active medication list. I have reviewed chart for other potential  high risk medication/s and harmful drug interactions in the elderly.          Aspirin is not on active medication list.  Aspirin use is not indicated based on review of current medical condition/s. Risk of harm outweighs potential benefits.  .    Patient Active Problem List   Diagnosis   • Fibromyalgia   • Arthritis   • Hiatal hernia   • Depression   • Asthma   • Hyperlipidemia   • Iron deficiency anemia   • Chronic obstructive pulmonary disease (HCC)   • Chronic fatigue   • Snoring   • Dependence on nicotine from cigarettes   • Insomnia   • Osteopenia   • Screening for iron deficiency anemia   • Shortness of breath   • Family history of hypothyroidism   • Diarrhea   • Lymphadenopathy   • Nausea   • Gastroesophageal reflux disease without esophagitis   • Duodenitis   • Early satiety   • Anxiety   • Celiac disease     Advance Care Planning  Advance Directive is on file.  ACP discussion was held with the patient during this visit. Patient has an advance directive in EMR which is still valid.     Review of Systems   Constitutional: Negative.  Negative for chills, fatigue and fever.   HENT: Negative.    Eyes: Negative for photophobia and visual disturbance.   Respiratory: Negative.    Cardiovascular: Negative.    Genitourinary:  "Negative.    Musculoskeletal: Negative.    Skin:        Neck nodule   Neurological: Negative.    Psychiatric/Behavioral: Negative.         Objective    Vitals:    02/02/23 1302   BP: 102/66   Pulse: 81   Temp: 97.1 °F (36.2 °C)   SpO2: 100%   Weight: 42.2 kg (93 lb)   Height: 157.5 cm (62\")     Estimated body mass index is 17.01 kg/m² as calculated from the following:    Height as of this encounter: 157.5 cm (62\").    Weight as of this encounter: 42.2 kg (93 lb).    BMI is below normal parameters (malnutrition). Recommendations: Spent time discussing diet changes and increase in protein in the diet      Does the patient have evidence of cognitive impairment? No    Physical Exam  Vitals reviewed.   Constitutional:       General: She is not in acute distress.     Appearance: Normal appearance. She is well-developed. She is not ill-appearing, toxic-appearing or diaphoretic.   HENT:      Head: Normocephalic and atraumatic.   Eyes:      General: No scleral icterus.        Right eye: No discharge.         Left eye: No discharge.      Extraocular Movements: Extraocular movements intact.   Neck:      Comments: Movable, compressible nodule to left posterior neck.    Cardiovascular:      Rate and Rhythm: Normal rate and regular rhythm.      Heart sounds: Normal heart sounds.   Pulmonary:      Effort: Pulmonary effort is normal. No respiratory distress.      Breath sounds: Normal breath sounds. No stridor. No wheezing or rhonchi.   Abdominal:      General: Bowel sounds are normal.      Palpations: Abdomen is soft.   Musculoskeletal:         General: Normal range of motion.      Cervical back: Normal range of motion.      Right lower leg: No edema.      Left lower leg: No edema.   Skin:     General: Skin is warm.   Neurological:      General: No focal deficit present.      Mental Status: She is alert and oriented to person, place, and time.      Motor: No weakness.      Gait: Gait normal.   Psychiatric:         Mood and Affect: " Mood normal.         Behavior: Behavior normal.       Lab Results   Component Value Date    CHLPL 162 2023    TRIG 79 2023    HDL 75 (H) 2023    LDL 72 2023    VLDL 15 2023    HGBA1C 5.90 (H) 2023            HEALTH RISK ASSESSMENT    Smoking Status:  Social History     Tobacco Use   Smoking Status Former   • Packs/day: 1.00   • Years: 40.00   • Pack years: 40.00   • Types: Cigarettes   • Quit date: 2016   • Years since quittin.2   Smokeless Tobacco Never     Alcohol Consumption:  Social History     Substance and Sexual Activity   Alcohol Use Yes   • Alcohol/week: 1.0 standard drink   • Types: 1 Shots of liquor per week    Comment: occ     Fall Risk Screen:    ROMEL Fall Risk Assessment was completed, and patient is at LOW risk for falls.Assessment completed on:2023    Depression Screening:  PHQ-2/PHQ-9 Depression Screening 2023   Retired PHQ-9 Total Score -   Retired Total Score -   Little Interest or Pleasure in Doing Things 0-->not at all   Feeling Down, Depressed or Hopeless 1-->several days   PHQ-9: Brief Depression Severity Measure Score 1       Health Habits and Functional and Cognitive Screening:  Functional & Cognitive Status 2023   Do you have difficulty preparing food and eating? Yes   Do you have difficulty bathing yourself, getting dressed or grooming yourself? No   Do you have difficulty using the toilet? No   Do you have difficulty moving around from place to place? No   Do you have trouble with steps or getting out of a bed or a chair? No   Current Diet Well Balanced Diet   Dental Exam Up to date   Eye Exam Up to date   Exercise (times per week) 7 times per week   Current Exercises Include Walking   Current Exercise Activities Include -   Do you need help using the phone?  No   Are you deaf or do you have serious difficulty hearing?  No   Do you need help with transportation? No   Do you need help shopping? No   Do you need help preparing  meals?  -   Do you need help with housework?  No   Do you need help with laundry? No   Do you need help taking your medications? No   Do you need help managing money? No   Do you ever drive or ride in a car without wearing a seat belt? No   Have you felt unusual stress, anger or loneliness in the last month? No   Who do you live with? Alone   If you need help, do you have trouble finding someone available to you? No   Have you been bothered in the last four weeks by sexual problems? No   Do you have difficulty concentrating, remembering or making decisions? No       Age-appropriate Screening Schedule:  Refer to the list below for future screening recommendations based on patient's age, sex and/or medical conditions. Orders for these recommended tests are listed in the plan section. The patient has been provided with a written plan.    Health Maintenance   Topic Date Due   • ZOSTER VACCINE (1 of 2) Never done   • LIPID PANEL  01/30/2024   • MAMMOGRAM  08/03/2024   • DXA SCAN  08/03/2024   • TDAP/TD VACCINES (3 - Td or Tdap) 10/03/2026   • INFLUENZA VACCINE  Completed              Assessment & Plan   CMS Preventative Services Quick Reference  Risk Factors Identified During Encounter  Underweight  The above risks/problems have been discussed with the patient.  Follow up actions/plans if indicated are seen below in the Assessment/Plan Section.  Pertinent information has been shared with the patient in the After Visit Summary.    Diagnoses and all orders for this visit:    1. Medicare annual wellness visit, subsequent (Primary)    2. Posterior cervical lymphadenopathy  -     US Head Neck Soft Tissue; Future       Patient is a pleasant 77-year-old female here today for her Medicare wellness visit.  Patient's vital signs are within normal limits.  Patient's BMI is 17.  She does have celiac disease and has made diet changes and is gluten-free.  I do think, after discussing her diet today, patient needs to incorporate more  protein.  We discussed ways to incorporate more protein in her diet.  She does have prediabetes, so we discussed limiting the amount of sugar in her diet as well.  Patient verbalized understanding.  Patient would like to decrease her buspirone.  Recommended taking 10 mg daily as she is taking 10 mg twice daily now.  Follow-up if anxiety symptoms worsen.  Reviewed her lab work today as well.  Patient will see her pulmonologist in April and gastroenterologist and August.    Health maintenance screenings:  · Colon cancer screening: Colonoscopy performed 2016.  Repeat in 10 years.  · Breast cancer screening: Mammogram last performed August 2022.  Patient prefers screening every 2 years.  She will be due again in August 2024.  Her mother was diagnosed with breast cancer at 93 and underwent a lumpectomy.  · Osteoporosis screening: Bone density last performed August 2022 revealing osteopenia.  Patient continues with calcium and vitamin D supplementation.  Recommended increasing weightbearing activity.  Repeat in August 2024.  · Medications: Up-to-date.  · Vision/dental: Up-to-date    Follow Up:   Return in about 6 months (around 8/2/2023) for Recheck- weight and anxiety.     An After Visit Summary and PPPS were made available to the patient.                   Electronically signed by BEATRIS Steele, 02/02/23, 3:09 PM EST.

## 2023-02-02 ENCOUNTER — OFFICE VISIT (OUTPATIENT)
Dept: FAMILY MEDICINE CLINIC | Facility: CLINIC | Age: 78
End: 2023-02-02
Payer: MEDICARE

## 2023-02-02 VITALS
HEART RATE: 81 BPM | TEMPERATURE: 97.1 F | OXYGEN SATURATION: 100 % | BODY MASS INDEX: 17.11 KG/M2 | WEIGHT: 93 LBS | SYSTOLIC BLOOD PRESSURE: 102 MMHG | HEIGHT: 62 IN | DIASTOLIC BLOOD PRESSURE: 66 MMHG

## 2023-02-02 DIAGNOSIS — Z00.00 MEDICARE ANNUAL WELLNESS VISIT, SUBSEQUENT: Primary | ICD-10-CM

## 2023-02-02 DIAGNOSIS — R59.0 POSTERIOR CERVICAL LYMPHADENOPATHY: ICD-10-CM

## 2023-02-02 PROCEDURE — 1170F FXNL STATUS ASSESSED: CPT | Performed by: NURSE PRACTITIONER

## 2023-02-02 PROCEDURE — G0439 PPPS, SUBSEQ VISIT: HCPCS | Performed by: NURSE PRACTITIONER

## 2023-02-02 PROCEDURE — 1159F MED LIST DOCD IN RCRD: CPT | Performed by: NURSE PRACTITIONER

## 2023-02-02 RX ORDER — MELATONIN 10 MG
CAPSULE ORAL
COMMUNITY

## 2023-02-28 ENCOUNTER — HOSPITAL ENCOUNTER (OUTPATIENT)
Dept: ULTRASOUND IMAGING | Facility: HOSPITAL | Age: 78
Discharge: HOME | End: 2023-02-28
Admitting: NURSE PRACTITIONER
Payer: MEDICARE

## 2023-02-28 DIAGNOSIS — R59.0 POSTERIOR CERVICAL LYMPHADENOPATHY: ICD-10-CM

## 2023-02-28 PROCEDURE — 76536 US EXAM OF HEAD AND NECK: CPT

## 2023-04-26 ENCOUNTER — TRANSCRIBE ORDERS (OUTPATIENT)
Dept: ADMINISTRATIVE | Facility: HOSPITAL | Age: 78
End: 2023-04-26
Payer: MEDICARE

## 2023-04-26 DIAGNOSIS — Z12.2 SCREENING FOR LUNG CANCER: Primary | ICD-10-CM

## 2023-05-11 DIAGNOSIS — F41.9 ANXIETY: ICD-10-CM

## 2023-05-11 RX ORDER — BUSPIRONE HYDROCHLORIDE 10 MG/1
10 TABLET ORAL 2 TIMES DAILY
Qty: 60 TABLET | Refills: 0 | Status: SHIPPED | OUTPATIENT
Start: 2023-05-11

## 2023-05-11 NOTE — TELEPHONE ENCOUNTER
Please fill while Denisse is out of the office     Rx Refill Note  Requested Prescriptions     Pending Prescriptions Disp Refills   • busPIRone (BUSPAR) 10 MG tablet 60 tablet 0     Sig: Take 1 tablet by mouth 2 (Two) Times a Day.      Last office visit with prescribing clinician: 2/2/2023   Last telemedicine visit with prescribing clinician: 2/2/2023   Next office visit with prescribing clinician: 8/3/2023                         Would you like a call back once the refill request has been completed: [] Yes [] No    If the office needs to give you a call back, can they leave a voicemail: [] Yes [] No    Anne Isals MA/LMR  05/11/23, 08:58 EDT

## 2023-05-11 NOTE — TELEPHONE ENCOUNTER
Caller: Елена Beach    Relationship: Self    Best call back number: [phone]     Requested Prescriptions:   Requested Prescriptions     Pending Prescriptions Disp Refills   • busPIRone (BUSPAR) 10 MG tablet 180 tablet 1     Sig: Take 1 tablet by mouth 2 (Two) Times a Day.        Pharmacy where request should be sent: [address] [phone] [facility] [phone]      Last office visit with prescribing clinician: 2/2/2023   Last telemedicine visit with prescribing clinician: 2/2/2023   Next office visit with prescribing clinician: 8/3/2023     Additional details provided by patient: PATIENT NEEDS IT FILLED BY TOMORROW. WILL BE GOING OUT OF TOWN. WANTS TO KNOW IF SHE COULD JUST HAVE A 5 DAY QUANTITY TO HOLD HER OFF WHILE SHE'S GONE    Does the patient have less than a 3 day supply:  [x] Yes  [] No    Would you like a call back once the refill request has been completed: [x] Yes [] No    If the office needs to give you a call back, can they leave a voicemail: [x] Yes [] No    Rafat Hicks Rep   05/11/23 08:54 EDT

## 2023-07-20 ENCOUNTER — HOSPITAL ENCOUNTER (OUTPATIENT)
Dept: CT IMAGING | Facility: HOSPITAL | Age: 78
Discharge: HOME | End: 2023-07-20
Admitting: INTERNAL MEDICINE
Payer: MEDICARE

## 2023-07-20 DIAGNOSIS — Z12.2 SCREENING FOR LUNG CANCER: ICD-10-CM

## 2023-07-20 PROCEDURE — 71271 CT THORAX LUNG CANCER SCR C-: CPT

## 2023-08-03 ENCOUNTER — OFFICE VISIT (OUTPATIENT)
Dept: FAMILY MEDICINE CLINIC | Facility: CLINIC | Age: 78
End: 2023-08-03
Payer: MEDICARE

## 2023-08-03 VITALS
TEMPERATURE: 98 F | OXYGEN SATURATION: 97 % | HEART RATE: 88 BPM | WEIGHT: 90 LBS | SYSTOLIC BLOOD PRESSURE: 92 MMHG | HEIGHT: 62 IN | DIASTOLIC BLOOD PRESSURE: 64 MMHG | BODY MASS INDEX: 16.56 KG/M2

## 2023-08-03 DIAGNOSIS — F41.9 ANXIETY: Primary | ICD-10-CM

## 2023-08-03 DIAGNOSIS — K90.0 CELIAC DISEASE: ICD-10-CM

## 2023-08-03 PROCEDURE — 99214 OFFICE O/P EST MOD 30 MIN: CPT | Performed by: NURSE PRACTITIONER

## 2023-08-03 RX ORDER — GUAIFENESIN 600 MG/1
1200 TABLET, EXTENDED RELEASE ORAL DAILY
COMMUNITY

## 2023-08-03 NOTE — PROGRESS NOTES
"Chief Complaint  Anxiety (6M fu - VIDHI 5 PHQ 9) and Weight Check (6M fu)    Subjective        Елена Beach presents to [facility]  History of Present Illness  Елена Beach is a 78 y.o. female to the clinic today to follow-up on anxiety and for weight reevaluation. She is currently taking BuSpar 2 times daily for anxiety. Her weight in November 2022 was 97 lbs. Weight in February was 93 lbs and weight today is 90 lbs.  She has had an appetite and has been eating foods like fruits, cottage cheese, popcorn, and ice cream.  We discussed increasing protein in her diet last office visit, but she has not made these changes.  She denies nausea, vomiting, or diarrhea.  She did have an issue with her jaw and gum recently that was addressed by her dentist, which limited her ability to chew.  She does feel as if her anxiety is better controlled with BuSpar.  However, she does have occasional situational anxiety, like with flying.  As far as her weight is concerned, she has had periods in her life where she has been 90 pounds consistently.  Highest weight 95.      Review of Systems   Constitutional:  Negative for chills, fatigue and fever.   Respiratory:  Negative for cough and shortness of breath.    Gastrointestinal:  Negative for abdominal pain, constipation, diarrhea, nausea and vomiting.     Objective   Vital Signs:  BP 92/64   Pulse 88   Temp 98 øF (36.7 øC)   Ht 157.5 cm (62\")   Wt 40.8 kg (90 lb)   SpO2 97%   BMI 16.46 kg/mý   Estimated body mass index is 16.46 kg/mý as calculated from the following:    Height as of this encounter: 157.5 cm (62\").    Weight as of this encounter: 40.8 kg (90 lb).               Physical Exam  Vitals reviewed.   Constitutional:       General: She is not in acute distress.     Appearance: Normal appearance. She is not ill-appearing, toxic-appearing or diaphoretic.   HENT:      Head: Normocephalic and atraumatic.   Pulmonary:      Effort: Pulmonary " effort is normal. No respiratory distress.   Neurological:      General: No focal deficit present.      Mental Status: She is alert and oriented to person, place, and time.      Motor: No weakness.      Gait: Gait normal.   Psychiatric:         Mood and Affect: Mood normal.         Behavior: Behavior normal.      Result Review :                   Assessment and Plan   Diagnoses and all orders for this visit:    1. Anxiety (Primary)    2. Celiac disease      Patient's weight is 90 pounds today.  She does have slight hypotension, but is asymptomatic.  I did recommend she increase the protein in her diet.  We discussed how to incorporate more protein.  Advised her to continue to monitor her weight.  Her celiac disease could be contributing.  She does have a follow-up with her gastroenterologist.  As far as her anxiety is concerned, she seems to be doing well with the BuSpar 2 times daily.  She is considering stopping anxiety medication.  I recommended that she start by taking BuSpar once daily.       Follow Up   Return in about 6 months (around 2/3/2024) for Annual physical- fasting labs 1 week prior.  Patient was given instructions and counseling regarding her condition or for health maintenance advice. Please see specific information pulled into the AVS if appropriate.     Electronically signed by BEATRIS Steele, 08/06/23, 5:25 PM EDT.

## 2023-08-10 NOTE — PROGRESS NOTES
"Subjective   Елена Beach is a 73 y.o. female.     History of Present Illness   Upper Respiratory Infection: Patient complains of symptoms of a URI. Symptoms include congestion, cough and sore throat. Onset of symptoms was 3 days ago, gradually worsening since that time. She also c/o shortness of breath and chest tightness for the past 3 days .  She is drinking plenty of fluids. Evaluation to date: none. Treatment to date: antihistamines.      The following portions of the patient's history were reviewed and updated as appropriate: allergies, current medications, past social history and problem list.    Review of Systems   HENT: Positive for congestion and postnasal drip.    Respiratory: Positive for cough.    All other systems reviewed and are negative.      Objective   /70 (BP Location: Right arm, Patient Position: Sitting)   Pulse 70   Temp 98.3 °F (36.8 °C)   Ht 157.5 cm (62.01\")   Wt 46.3 kg (102 lb)   SpO2 98%   BMI 18.65 kg/m²   Physical Exam   Constitutional: She is oriented to person, place, and time. Vital signs are normal. She appears well-developed and well-nourished. No distress.   HENT:   Head: Normocephalic.   Cardiovascular: Normal rate, regular rhythm and normal heart sounds.    Pulmonary/Chest: Effort normal and breath sounds normal.   Neurological: She is alert and oriented to person, place, and time. Gait normal.   Psychiatric: She has a normal mood and affect. Her behavior is normal. Judgment and thought content normal.   Vitals reviewed.      Assessment/Plan   Problem List Items Addressed This Visit        Respiratory    Acute URI - Primary    Bronchitis    Relevant Medications    Fexofenadine HCl (MUCINEX ALLERGY PO)    MethylPREDNISolone (MEDROL, SONIA,) 4 MG tablet      Other Visit Diagnoses    None.       rto prn     " not applicable

## 2023-08-24 DIAGNOSIS — E78.5 HYPERLIPIDEMIA, UNSPECIFIED HYPERLIPIDEMIA TYPE: ICD-10-CM

## 2023-08-24 RX ORDER — ATORVASTATIN CALCIUM 10 MG/1
TABLET, FILM COATED ORAL
Qty: 90 TABLET | Refills: 0 | Status: SHIPPED | OUTPATIENT
Start: 2023-08-24

## 2023-09-05 RX ORDER — OMEPRAZOLE 40 MG/1
CAPSULE, DELAYED RELEASE ORAL
Qty: 180 CAPSULE | Refills: 0 | Status: SHIPPED | OUTPATIENT
Start: 2023-09-05

## 2023-11-14 ENCOUNTER — OFFICE VISIT (OUTPATIENT)
Dept: GASTROENTEROLOGY | Facility: CLINIC | Age: 78
End: 2023-11-14
Payer: MEDICARE

## 2023-11-14 VITALS
WEIGHT: 87 LBS | HEIGHT: 62 IN | HEART RATE: 74 BPM | OXYGEN SATURATION: 94 % | BODY MASS INDEX: 16.01 KG/M2 | TEMPERATURE: 96.2 F | DIASTOLIC BLOOD PRESSURE: 67 MMHG | SYSTOLIC BLOOD PRESSURE: 108 MMHG

## 2023-11-14 DIAGNOSIS — R63.4 WEIGHT LOSS: ICD-10-CM

## 2023-11-14 DIAGNOSIS — K21.9 GASTROESOPHAGEAL REFLUX DISEASE, UNSPECIFIED WHETHER ESOPHAGITIS PRESENT: Primary | ICD-10-CM

## 2023-11-14 PROCEDURE — 1160F RVW MEDS BY RX/DR IN RCRD: CPT | Performed by: INTERNAL MEDICINE

## 2023-11-14 PROCEDURE — 1159F MED LIST DOCD IN RCRD: CPT | Performed by: INTERNAL MEDICINE

## 2023-11-14 PROCEDURE — 99214 OFFICE O/P EST MOD 30 MIN: CPT | Performed by: INTERNAL MEDICINE

## 2023-11-14 RX ORDER — SODIUM CHLORIDE, SODIUM LACTATE, POTASSIUM CHLORIDE, CALCIUM CHLORIDE 600; 310; 30; 20 MG/100ML; MG/100ML; MG/100ML; MG/100ML
30 INJECTION, SOLUTION INTRAVENOUS CONTINUOUS
OUTPATIENT
Start: 2023-11-14

## 2023-11-14 NOTE — PROGRESS NOTES
Chief Complaint   Patient presents with    Heartburn     Gerd          Елена Beach is a  78 y.o. female here for a follow up visit for GERD, celiac disease, weight loss    HPI this 78-year-old white female patient of BEATRIS Steele presents since last seen in this office in October 2022.  She has a history of gluten enteropathy and follows a gluten-free diet.  She has had a weight loss of 10 pounds over the past year which has not been by choice.  She does admit to some early satiety.  Her reflux is well managed with proton pump inhibitor therapy.  Her last upper endoscopic evaluation was performed in June 2022.  She states her last colonoscopy was done several years ago.  We talked about further evaluation and I would offer upper endoscopy at this time because of persistent nature of her symptoms.  Also encouraged her to use a nutritional supplement on a regular basis.  She states her bowel pattern is routine at present.  She does complain of some excess gas and we talked about dietary adjustment as well.  Last colonoscopy was performed in 2016 and she would be due for follow-up in 2026.    Past Medical History:   Diagnosis Date    Anemia     Arthritis     Asthma     Back pain     Bursitis 1995    Celiac disease     COPD (chronic obstructive pulmonary disease)     COVID-19 01/2022    Fibromyalgia     Fibromyalgia, primary     Fibromyositis 1995    GERD (gastroesophageal reflux disease)     Hernia     Hiatal hernia     History of pneumonia     Hyperlipidemia     Knee pain     Osteoarthritis 2008    Osteoporosis     PONV (postoperative nausea and vomiting)     Scoliosis 1995    Screening for iron deficiency anemia 02/20/2018       Current Outpatient Medications   Medication Sig Dispense Refill    arformoterol (BROVANA) 15 MCG/2ML nebulizer solution USE 1 VIAL IN NEBULIZER TWICE DAILY      atorvastatin (LIPITOR) 10 MG tablet TAKE 1 TABLET EVERY NIGHT 90 tablet 0    Azelastine HCl 137 MCG/SPRAY solution USE 1  "TO 2 SPRAY(S) IN EACH NOSTRIL TWICE DAILY 90 mL 0    azithromycin (ZITHROMAX) 250 MG tablet Take 1 tablet by mouth 3 (Three) Times a Week.      budesonide (PULMICORT) 0.5 MG/2ML nebulizer solution Take 2 mL by nebulization Daily.      busPIRone (BUSPAR) 10 MG tablet Take 1 tablet by mouth 2 (Two) Times a Day. 60 tablet 0    Calcium Citrate-Vitamin D (CALCIUM CITRATE + D PO) Take 1 tablet by mouth Daily.      Ferrous Gluconate (IRON 27 PO) Take 1 tablet by mouth Every Other Day.      guaiFENesin (MUCINEX) 600 MG 12 hr tablet Take 2 tablets by mouth Daily.      Melatonin 10 MG capsule Take  by mouth.      Multiple Vitamins-Minerals (MULTIVITAMIN ADULT PO) Take 1 tablet by mouth Daily.      omeprazole (priLOSEC) 40 MG capsule TAKE 1 CAPSULE TWICE DAILY BEFORE MEALS 180 capsule 0    probiotic (CULTURELLE) capsule capsule Take  by mouth Daily.      glucosamine sulfate 500 MG capsule capsule Take  by mouth Daily. (Patient not taking: Reported on 2023)       No current facility-administered medications for this visit.       PRN Meds:.    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    Cephalosporins Irritability     Mental changes    Ibuprofen Other (See Comments)     Can't take due to hiatal hernia    Pseudoephedrine Other (See Comments)     Heart palpitations    Codeine Other (See Comments)     Keeps awake    Keflex [Cephalexin] Other (See Comments)     Makes feel \"goofy\"       Social History     Socioeconomic History    Marital status: Single    Number of children: 1   Tobacco Use    Smoking status: Former     Packs/day: 1.00     Years: 40.00     Additional pack years: 0.00     Total pack years: 40.00     Types: Cigarettes     Quit date: 2016     Years since quittin.0    Smokeless tobacco: Never   Vaping Use    Vaping Use: Never used   Substance and Sexual " Activity    Alcohol use: Yes     Alcohol/week: 1.0 standard drink of alcohol     Types: 1 Shots of liquor per week     Comment: occ    Drug use: No    Sexual activity: Not Currently     Partners: Male     Birth control/protection: Other       Family History   Problem Relation Age of Onset    Heart failure Mother     Hypertension Mother     Cancer Mother         breast    Cancer Father         prostate    Malig Hyperthermia Neg Hx        Review of Systems   Constitutional:  Negative for activity change, appetite change, fatigue and unexpected weight change.   HENT:  Negative for congestion, facial swelling, sore throat, trouble swallowing and voice change.    Eyes:  Negative for photophobia and visual disturbance.   Respiratory:  Negative for cough and choking.    Cardiovascular:  Negative for chest pain.   Gastrointestinal:  Negative for abdominal distention, abdominal pain, anal bleeding, blood in stool, constipation, diarrhea, nausea, rectal pain and vomiting.        GERD  Early satiety   Endocrine: Negative for polyphagia.   Musculoskeletal:  Negative for arthralgias, gait problem and joint swelling.   Skin:  Negative for color change, pallor and rash.   Allergic/Immunologic: Negative for food allergies.   Neurological:  Negative for speech difficulty and headaches.   Hematological:  Does not bruise/bleed easily.   Psychiatric/Behavioral:  Negative for agitation, confusion and sleep disturbance.        Vitals:    11/14/23 1142   BP: 108/67   Pulse: 74   Temp: 96.2 °F (35.7 °C)   SpO2: 94%       Physical Exam  Constitutional:       Appearance: She is well-developed.   HENT:      Head: Normocephalic.   Eyes:      Conjunctiva/sclera: Conjunctivae normal.   Cardiovascular:      Rate and Rhythm: Normal rate and regular rhythm.   Pulmonary:      Breath sounds: Normal breath sounds.   Abdominal:      General: Bowel sounds are normal.      Palpations: Abdomen is soft.   Musculoskeletal:         General: Normal range of  motion.      Cervical back: Normal range of motion.   Skin:     General: Skin is warm and dry.   Neurological:      Mental Status: She is alert and oriented to person, place, and time.   Psychiatric:         Behavior: Behavior normal.         ASSESSMENT   #1 GERD  #2 early satiety  #3 weight loss  #4 gluten enteropathy      PLAN  Schedule EGD  Increase caloric intake with nutritional supplements  Continue current medical regimen  Consider referral to dietitian if weight continues to drop    No diagnosis found.

## 2024-01-18 DIAGNOSIS — E78.5 HYPERLIPIDEMIA, UNSPECIFIED HYPERLIPIDEMIA TYPE: Primary | ICD-10-CM

## 2024-01-18 DIAGNOSIS — Z13.0 SCREENING FOR DEFICIENCY ANEMIA: ICD-10-CM

## 2024-01-18 DIAGNOSIS — Z13.0 SCREENING FOR IRON DEFICIENCY ANEMIA: ICD-10-CM

## 2024-01-18 DIAGNOSIS — Z13.29 SCREENING FOR THYROID DISORDER: ICD-10-CM

## 2024-01-18 DIAGNOSIS — R73.09 ELEVATED GLUCOSE: ICD-10-CM

## 2024-01-18 DIAGNOSIS — Z79.899 ON STATIN THERAPY: ICD-10-CM

## 2024-02-04 DIAGNOSIS — F41.9 ANXIETY: ICD-10-CM

## 2024-02-05 RX ORDER — BUSPIRONE HYDROCHLORIDE 10 MG/1
10 TABLET ORAL 2 TIMES DAILY
Qty: 180 TABLET | Refills: 1 | Status: SHIPPED | OUTPATIENT
Start: 2024-02-05

## 2024-02-05 NOTE — TELEPHONE ENCOUNTER
Rx Refill Note  Requested Prescriptions     Pending Prescriptions Disp Refills    busPIRone (BUSPAR) 10 MG tablet [Pharmacy Med Name: BUSPIRONE HYDROCHLORIDE 10 MG Tablet] 180 tablet 3     Sig: TAKE 1 TABLET TWICE DAILY      Last office visit with prescribing clinician: 8/3/2023   Last telemedicine visit with prescribing clinician: Visit date not found   Next office visit with prescribing clinician: 2/15/2024                         Would you like a call back once the refill request has been completed: [] Yes [] No    If the office needs to give you a call back, can they leave a voicemail: [] Yes [] No    Marino Luciano CMA/LMR  02/05/24, 08:03 EST

## 2024-02-15 ENCOUNTER — OFFICE VISIT (OUTPATIENT)
Dept: FAMILY MEDICINE CLINIC | Facility: CLINIC | Age: 79
End: 2024-02-15
Payer: MEDICARE

## 2024-02-15 VITALS
TEMPERATURE: 97.7 F | BODY MASS INDEX: 16.01 KG/M2 | WEIGHT: 87 LBS | SYSTOLIC BLOOD PRESSURE: 108 MMHG | OXYGEN SATURATION: 98 % | HEART RATE: 58 BPM | DIASTOLIC BLOOD PRESSURE: 70 MMHG | HEIGHT: 62 IN

## 2024-02-15 DIAGNOSIS — Z13.820 SCREENING FOR OSTEOPOROSIS: ICD-10-CM

## 2024-02-15 DIAGNOSIS — Z78.0 POST-MENOPAUSAL: ICD-10-CM

## 2024-02-15 DIAGNOSIS — E78.5 HYPERLIPIDEMIA, UNSPECIFIED HYPERLIPIDEMIA TYPE: ICD-10-CM

## 2024-02-15 DIAGNOSIS — Z00.00 MEDICARE ANNUAL WELLNESS VISIT, SUBSEQUENT: Primary | ICD-10-CM

## 2024-02-15 DIAGNOSIS — Z12.31 ENCOUNTER FOR SCREENING MAMMOGRAM FOR MALIGNANT NEOPLASM OF BREAST: ICD-10-CM

## 2024-02-15 DIAGNOSIS — Z13.6 ENCOUNTER FOR SCREENING FOR CORONARY ARTERY DISEASE: ICD-10-CM

## 2024-02-15 RX ORDER — ATORVASTATIN CALCIUM 10 MG/1
10 TABLET, FILM COATED ORAL NIGHTLY
Qty: 90 TABLET | Refills: 1 | Status: SHIPPED | OUTPATIENT
Start: 2024-02-15

## 2024-03-04 ENCOUNTER — OFFICE VISIT (OUTPATIENT)
Dept: FAMILY MEDICINE CLINIC | Facility: CLINIC | Age: 79
End: 2024-03-04
Payer: MEDICARE

## 2024-03-04 VITALS
WEIGHT: 88 LBS | TEMPERATURE: 98 F | BODY MASS INDEX: 16.2 KG/M2 | SYSTOLIC BLOOD PRESSURE: 104 MMHG | DIASTOLIC BLOOD PRESSURE: 64 MMHG | HEIGHT: 62 IN | HEART RATE: 94 BPM | OXYGEN SATURATION: 96 %

## 2024-03-04 DIAGNOSIS — B00.1 COLD SORE: Primary | ICD-10-CM

## 2024-03-04 DIAGNOSIS — J02.9 SORE THROAT: ICD-10-CM

## 2024-03-04 LAB
EXPIRATION DATE: NORMAL
INTERNAL CONTROL: NORMAL
Lab: NORMAL
S PYO RRNA THROAT QL PROBE: NEGATIVE

## 2024-03-04 PROCEDURE — 1160F RVW MEDS BY RX/DR IN RCRD: CPT | Performed by: NURSE PRACTITIONER

## 2024-03-04 PROCEDURE — 1159F MED LIST DOCD IN RCRD: CPT | Performed by: NURSE PRACTITIONER

## 2024-03-04 PROCEDURE — 87651 STREP A DNA AMP PROBE: CPT | Performed by: NURSE PRACTITIONER

## 2024-03-04 PROCEDURE — 99213 OFFICE O/P EST LOW 20 MIN: CPT | Performed by: NURSE PRACTITIONER

## 2024-03-04 RX ORDER — VALACYCLOVIR HYDROCHLORIDE 1 G/1
1000 TABLET, FILM COATED ORAL 2 TIMES DAILY
Qty: 14 TABLET | Refills: 0 | Status: SHIPPED | OUTPATIENT
Start: 2024-03-04 | End: 2024-03-11

## 2024-03-04 NOTE — PROGRESS NOTES
"Chief Complaint  Sore Throat (Sore throat x4D Red spot above lip since Friday )    Subjective        Елена Beach presents to [facility]  History of Present Illness  Елена Beach is a 78 y.o. female who presents to the clinic today with complaints of a sore throat for 4 days and a \"red spot\" above her lip for 3 days. She denies body aches, chills, fever, or cough. She does have rhinorrhea, but this is not uncommon for her. The sore above her lip has not grown in size or had drainage present. Her lower lip is starting a sore and it burns and tingles. She denies new soaps, lotions, or detergents. She denies history of cold sores.     Objective   Vital Signs:  /64   Pulse 94   Temp 98 °F (36.7 °C)   Ht 157.5 cm (62.01\")   Wt 39.9 kg (88 lb)   SpO2 96%   BMI 16.09 kg/m²   Estimated body mass index is 16.09 kg/m² as calculated from the following:    Height as of this encounter: 157.5 cm (62.01\").    Weight as of this encounter: 39.9 kg (88 lb).               Physical Exam  Vitals reviewed.   Constitutional:       Appearance: Normal appearance.   HENT:      Head: Normocephalic and atraumatic.      Mouth/Throat:      Pharynx: Posterior oropharyngeal erythema present. No oropharyngeal exudate.   Pulmonary:      Effort: Pulmonary effort is normal. No respiratory distress.   Skin:     Findings: Rash present.      Comments: Clustered erythematous, scabbed papular rash above left lip. Erythema and sore to lower left lip.    Neurological:      General: No focal deficit present.      Mental Status: She is alert and oriented to person, place, and time.      Motor: No weakness.      Gait: Gait normal.   Psychiatric:         Mood and Affect: Mood normal.         Behavior: Behavior normal.        Result Review :          POCT Strep A, molecular (03/04/2024 14:19)            Assessment and Plan     Diagnoses and all orders for this visit:    1. Cold sore (Primary)  -     valACYclovir " (VALTREX) 1000 MG tablet; Take 1 tablet by mouth 2 (Two) Times a Day for 7 days.  Dispense: 14 tablet; Refill: 0    2. Sore throat  -     POCT Strep A, molecular      Given erythematous and clustered nature of the sore above her lip and symptoms of burning and tingling, concern for cold sore.  Valacyclovir prescribed.  Patient sore throat could be result of postnasal drip and recommended Flonase, hot tea, and honey. Call if symptoms do not improve.          Follow Up     Return if symptoms worsen or fail to improve.  Patient was given instructions and counseling regarding her condition or for health maintenance advice. Please see specific information pulled into the AVS if appropriate.       Electronically signed by BEATRIS Steele, 03/05/24, 12:51 PM EST.

## 2024-03-07 RX ORDER — GUAIFENESIN 600 MG/1
600 TABLET, EXTENDED RELEASE ORAL 2 TIMES DAILY
COMMUNITY

## 2024-03-08 ENCOUNTER — HOSPITAL ENCOUNTER (OUTPATIENT)
Facility: HOSPITAL | Age: 79
Setting detail: HOSPITAL OUTPATIENT SURGERY
Discharge: HOME | End: 2024-03-08
Attending: INTERNAL MEDICINE | Admitting: INTERNAL MEDICINE
Payer: MEDICARE

## 2024-03-08 ENCOUNTER — ANESTHESIA (OUTPATIENT)
Dept: GASTROENTEROLOGY | Facility: HOSPITAL | Age: 79
End: 2024-03-08
Payer: MEDICARE

## 2024-03-08 ENCOUNTER — ANESTHESIA EVENT (OUTPATIENT)
Dept: GASTROENTEROLOGY | Facility: HOSPITAL | Age: 79
End: 2024-03-08
Payer: MEDICARE

## 2024-03-08 VITALS
WEIGHT: 83 LBS | RESPIRATION RATE: 16 BRPM | BODY MASS INDEX: 15.27 KG/M2 | SYSTOLIC BLOOD PRESSURE: 107 MMHG | HEART RATE: 70 BPM | OXYGEN SATURATION: 96 % | HEIGHT: 62 IN | DIASTOLIC BLOOD PRESSURE: 58 MMHG

## 2024-03-08 DIAGNOSIS — R63.4 WEIGHT LOSS: ICD-10-CM

## 2024-03-08 DIAGNOSIS — K21.9 GASTROESOPHAGEAL REFLUX DISEASE, UNSPECIFIED WHETHER ESOPHAGITIS PRESENT: ICD-10-CM

## 2024-03-08 PROCEDURE — 25010000002 PROPOFOL 10 MG/ML EMULSION: Performed by: ANESTHESIOLOGY

## 2024-03-08 PROCEDURE — 43239 EGD BIOPSY SINGLE/MULTIPLE: CPT | Performed by: INTERNAL MEDICINE

## 2024-03-08 PROCEDURE — 88305 TISSUE EXAM BY PATHOLOGIST: CPT | Performed by: INTERNAL MEDICINE

## 2024-03-08 PROCEDURE — S0260 H&P FOR SURGERY: HCPCS | Performed by: INTERNAL MEDICINE

## 2024-03-08 PROCEDURE — 87081 CULTURE SCREEN ONLY: CPT | Performed by: INTERNAL MEDICINE

## 2024-03-08 PROCEDURE — 25810000003 LACTATED RINGERS PER 1000 ML: Performed by: INTERNAL MEDICINE

## 2024-03-08 RX ORDER — SODIUM CHLORIDE, SODIUM LACTATE, POTASSIUM CHLORIDE, CALCIUM CHLORIDE 600; 310; 30; 20 MG/100ML; MG/100ML; MG/100ML; MG/100ML
30 INJECTION, SOLUTION INTRAVENOUS CONTINUOUS
Status: DISCONTINUED | OUTPATIENT
Start: 2024-03-08 | End: 2024-03-08 | Stop reason: HOSPADM

## 2024-03-08 RX ORDER — EPHEDRINE SULFATE 50 MG/ML
INJECTION, SOLUTION INTRAVENOUS AS NEEDED
Status: DISCONTINUED | OUTPATIENT
Start: 2024-03-08 | End: 2024-03-08

## 2024-03-08 RX ORDER — LIDOCAINE HYDROCHLORIDE 20 MG/ML
INJECTION, SOLUTION INFILTRATION; PERINEURAL AS NEEDED
Status: DISCONTINUED | OUTPATIENT
Start: 2024-03-08 | End: 2024-03-08 | Stop reason: SURG

## 2024-03-08 RX ORDER — PROPOFOL 10 MG/ML
VIAL (ML) INTRAVENOUS AS NEEDED
Status: DISCONTINUED | OUTPATIENT
Start: 2024-03-08 | End: 2024-03-08 | Stop reason: SURG

## 2024-03-08 RX ADMIN — PROPOFOL 80 MG: 10 INJECTION, EMULSION INTRAVENOUS at 09:06

## 2024-03-08 RX ADMIN — SODIUM CHLORIDE, POTASSIUM CHLORIDE, SODIUM LACTATE AND CALCIUM CHLORIDE 30 ML/HR: 600; 310; 30; 20 INJECTION, SOLUTION INTRAVENOUS at 08:21

## 2024-03-08 RX ADMIN — LIDOCAINE HYDROCHLORIDE 40 MG: 20 INJECTION, SOLUTION INFILTRATION; PERINEURAL at 09:06

## 2024-03-08 RX ADMIN — PROPOFOL 200 MCG/KG/MIN: 10 INJECTION, EMULSION INTRAVENOUS at 09:06

## 2024-03-08 NOTE — ANESTHESIA PREPROCEDURE EVALUATION
" Anesthesia Evaluation     Patient summary reviewed and Nursing notes reviewed   history of anesthetic complications:  PONV  NPO Solid Status: > 8 hours  NPO Liquid Status: > 2 hours           Airway   Mallampati: II  TM distance: >3 FB  Neck ROM: full  No difficulty expected  Dental - normal exam     Pulmonary - normal exam   (+) a smoker Former, COPD, asthma,shortness of breath  Cardiovascular - normal exam  Exercise tolerance: poor (<4 METS)    ECG reviewed  Rhythm: regular    (+) hyperlipidemia  (-) past MI, angina, cardiac stents      Neuro/Psych  (+) psychiatric history Depression  (-) seizures, CVA  GI/Hepatic/Renal/Endo    (+) hiatal hernia, GERD  (-) liver disease, no renal disease    ROS Comment: Celiac disease    Musculoskeletal     (+) back pain, myalgias      ROS comment: Fibromyalgia  Abdominal  - normal exam   Substance History - negative use     OB/GYN          Other   arthritis,                       Anesthesia Plan    ASA 3     MAC     (I have reviewed the patient's history and chart with the patient, including all pertinent laboratory results and imaging. I have explained the risks of monitored anesthesia care including but not limited to respiratory depression, possible need for airway intervention, or possible intra-op recall. I explained that airway intervention involves risk of possible dental injury.    VITALS:  Ht 157.5 cm (62\")   BMI 16.10 kg/m² )  intravenous induction     Anesthetic plan, risks, benefits, and alternatives have been provided, discussed and informed consent has been obtained with: patient.  Pre-procedure education provided  Plan discussed with CRNA.        CODE STATUS:       "

## 2024-03-08 NOTE — ANESTHESIA POSTPROCEDURE EVALUATION
Patient: Елена Beach    Procedure Summary       Date: 03/08/24 Room / Location:  [facility]    Anesthesia Start: 0859 Anesthesia Stop: 0922    Procedure: ESOPHAGOGASTRODUODENOSCOPY with bx (Esophagus) Diagnosis:       Esophagitis      Gastritis      Duodenitis      (Gastroesophageal reflux disease, unspecified whether esophagitis present [K21.9])      (Weight loss [R63.4])    Surgeons: Igor Marshall MD Provider:     Anesthesia Type: MAC ASA Status: 3            Anesthesia Type: MAC    Vitals  Vitals Value Taken Time   /58 03/08/24 0941   Temp     Pulse 70 03/08/24 0943   Resp 16 03/08/24 0941   SpO2 96 % 03/08/24 0943   Vitals shown include unfiled device data.        Post Anesthesia Care and Evaluation    Patient location during evaluation: PHASE II  Patient participation: complete - patient participated  Level of consciousness: awake  Pain management: adequate    Airway patency: patent  Anesthetic complications: No anesthetic complications  PONV Status: none  Cardiovascular status: stable  Respiratory status: acceptable  Hydration status: acceptable

## 2024-03-08 NOTE — H&P
[facility]  Pre Procedure History & Physical    Chief Complaint:   GERD, early satiety, weight loss    Subjective     HPI:   This 78-year-old female presents the endoscopy suite for upper endoscopic evaluation.  She has issues with weight loss, early satiety, and reflux.  Also prior history of gluten enteropathy.    Past Medical History:   Past Medical History:   Diagnosis Date    Anemia     history    Arthritis     Asthma     Back pain     Bursitis 1995    Celiac disease     COPD (chronic obstructive pulmonary disease)     COVID-19 01/2022    Fibromyalgia, primary     Fibromyositis 1995    GERD (gastroesophageal reflux disease)     Hiatal hernia     History of pneumonia     Hyperlipidemia     Knee pain     Osteoarthritis 2008    Osteoporosis     Scoliosis 1995    Screening for iron deficiency anemia 02/20/2018    Weight loss        Past Surgical History:  Past Surgical History:   Procedure Laterality Date    CATARACT EXTRACTION EXTRACAPSULAR W/ INTRAOCULAR LENS IMPLANTATION Bilateral     COLONOSCOPY      ENDOSCOPY      ENDOSCOPY N/A 06/22/2022    Procedure: ESOPHAGOGASTRODUODENOSCOPY with biopsies;  Surgeon: Igor Marshall MD;  Location: [facility] ENDOSCOPY;  Service: Gastroenterology;  Laterality: N/A;  pre- reflux, nausea, gluten enteropathy  post- mild esophagitis, gastritis    FOOT SURGERY Right 2006    HAND SURGERY Right 2004    SHOULDER ARTHROSCOPY Left 2014    TOOTH EXTRACTION      TOTAL KNEE ARTHROPLASTY Right 12/27/2018    Procedure: TOTAL KNEE ARTHROPLASTY;  Surgeon: Gerry Estevez MD;  Location: [facility] MAIN OR;  Service: Orthopedics    TUBAL ABDOMINAL LIGATION      UPPER GASTROINTESTINAL ENDOSCOPY      WRIST SURGERY Right        Family History:  Family History   Problem Relation Age of Onset    Heart failure Mother     Hypertension Mother     Cancer Mother         breast    Cancer Father         prostate    Malig Hyperthermia Neg Hx        Social History:   reports that  she quit smoking about 7 years ago. Her smoking use included cigarettes. She started smoking about 47 years ago. She has a 40 pack-year smoking history. She has never used smokeless tobacco. She reports that she does not currently use alcohol. She reports that she does not use drugs.    Medications:   Medications Prior to Admission   Medication Sig Dispense Refill Last Dose    arformoterol (BROVANA) 15 MCG/2ML nebulizer solution USE 1 VIAL IN NEBULIZER TWICE DAILY       atorvastatin (LIPITOR) 10 MG tablet Take 1 tablet by mouth Every Night. 90 tablet 1     Azelastine HCl 137 MCG/SPRAY solution USE 1 TO 2 SPRAY(S) IN EACH NOSTRIL TWICE DAILY 90 mL 0     azithromycin (ZITHROMAX) 250 MG tablet Take 1 tablet by mouth 3 (Three) Times a Week.       budesonide (PULMICORT) 0.5 MG/2ML nebulizer solution Take 2 mL by nebulization Daily.       busPIRone (BUSPAR) 10 MG tablet TAKE 1 TABLET TWICE DAILY (Patient taking differently: Take 1 tablet by mouth Daily.) 180 tablet 1     Calcium Citrate-Vitamin D (CALCIUM CITRATE + D PO) Take 1 tablet by mouth Daily.       Ferrous Gluconate (IRON 27 PO) Take 1 tablet by mouth Every Other Day.       glucosamine sulfate 500 MG capsule capsule Take  by mouth Daily.       guaiFENesin (MUCINEX) 600 MG 12 hr tablet Take 1 tablet by mouth 2 (Two) Times a Day.       Melatonin 10 MG capsule Place 1 capsule under the tongue Every Night.       Multiple Vitamins-Minerals (MULTIVITAMIN ADULT PO) Take 1 tablet by mouth Daily.       omeprazole (priLOSEC) 40 MG capsule TAKE 1 CAPSULE TWICE DAILY BEFORE MEALS (Patient taking differently: Take 1 capsule by mouth Daily.) 180 capsule 0     probiotic (CULTURELLE) capsule capsule Take 1 capsule by mouth Daily.       valACYclovir (VALTREX) 1000 MG tablet Take 1 tablet by mouth 2 (Two) Times a Day for 7 days. 14 tablet 0        Allergies:  Fish-derived products, Cephalosporins, Ibuprofen, Pseudoephedrine, Wheat, Codeine, and Keflex [cephalexin]    ROS:   "  Pertinent items are noted in HPI, all other systems reviewed and negative     Objective     Height 157.5 cm (62\").    Physical Exam   Constitutional: Pt is oriented to person, place, and time and well-developed, well-nourished, and in no distress.   Mouth/Throat: Oropharynx is clear and moist.   Neck: Normal range of motion.   Cardiovascular: Normal rate, regular rhythm and normal heart sounds.    Pulmonary/Chest: Effort normal and breath sounds normal.   Abdominal: Soft. Nontender  Skin: Skin is warm and dry.   Psychiatric: Mood, memory, affect and judgment normal.     Assessment & Plan     Diagnosis:  GERD  Weight loss  Early satiety    Anticipated Surgical Procedure:  EGD    The risks, benefits, and alternatives of this procedure have been discussed with the patient or the responsible party- the patient understands and agrees to proceed.                                                          "

## 2024-03-08 NOTE — DISCHARGE INSTRUCTIONS

## 2024-03-09 LAB — UREASE TISS QL: NEGATIVE

## 2024-03-11 RX ORDER — OMEPRAZOLE 40 MG/1
CAPSULE, DELAYED RELEASE ORAL
Qty: 180 CAPSULE | Refills: 3 | Status: SHIPPED | OUTPATIENT
Start: 2024-03-11

## 2024-03-20 ENCOUNTER — TELEPHONE (OUTPATIENT)
Dept: GASTROENTEROLOGY | Facility: CLINIC | Age: 79
End: 2024-03-20
Payer: MEDICARE

## 2024-03-20 NOTE — TELEPHONE ENCOUNTER
Called pt and per path report advised that her sm bowel was benign and showed increase in lymphocytes.  The stomach bx showed mild to moderate gastropathy and no h pylori.  The ge junction bx showed orlando's esophagus without dysplasia. The esophageal bx showed no significant inflammation.        Advised of Dr Marshall's recommendations.  Verb understanding.     Pt reports that she does not need celiac testing because she does have celiac disease . Pt states she will work on her celiac diet.   Update sent to Dr Marshall.

## 2024-03-20 NOTE — TELEPHONE ENCOUNTER
----- Message from Igor AGUILAR MD sent at 3/19/2024  8:46 AM EDT -----  Regarding: Biopsy results  Okay to call results, recommend continue PPI, follow-up EGD in 3 years, and test with celiac panel if not done within the last 2 years.  ----- Message -----  From: Lab, Background User  Sent: 3/9/2024   7:23 PM EDT  To: Igor AGUILAR MD

## 2024-04-05 ENCOUNTER — HOSPITAL ENCOUNTER (OUTPATIENT)
Dept: CT IMAGING | Facility: HOSPITAL | Age: 79
Discharge: HOME | End: 2024-04-05
Admitting: NURSE PRACTITIONER

## 2024-04-05 DIAGNOSIS — Z13.6 ENCOUNTER FOR SCREENING FOR CORONARY ARTERY DISEASE: ICD-10-CM

## 2024-04-05 PROCEDURE — 75571 CT HRT W/O DYE W/CA TEST: CPT

## 2024-04-09 ENCOUNTER — HOSPITAL ENCOUNTER (OUTPATIENT)
Dept: MAMMOGRAPHY | Facility: HOSPITAL | Age: 79
Discharge: HOME | End: 2024-04-09
Admitting: NURSE PRACTITIONER
Payer: MEDICARE

## 2024-04-09 ENCOUNTER — TELEPHONE (OUTPATIENT)
Dept: GASTROENTEROLOGY | Facility: CLINIC | Age: 79
End: 2024-04-09
Payer: MEDICARE

## 2024-04-09 DIAGNOSIS — Z12.31 ENCOUNTER FOR SCREENING MAMMOGRAM FOR MALIGNANT NEOPLASM OF BREAST: ICD-10-CM

## 2024-04-09 PROCEDURE — 77063 BREAST TOMOSYNTHESIS BI: CPT

## 2024-04-09 PROCEDURE — 77067 SCR MAMMO BI INCL CAD: CPT

## 2024-04-09 NOTE — TELEPHONE ENCOUNTER
----- Message from Елена Beach sent at 4/9/2024  9:15 AM EDT -----  Regarding: Recent hospitalization   Contact: [phone]  On Sat. 4/6, I was having trouble breathing so I went to [facility] , I was treated with a steroid shot, baby aspirin and a nebulizer treatment.. started to feel better, but some tests showed I might need further heart testing. I was sent to cardiac observation unit at [facility] where I had three stress tests . Heart is fine. Doctor there suggested I start taking 81 mg baby aspirin daily . I have  hiatal hernia and I’m not sure this is a med I should be taking . Please advise.  Thanks, Елена Beach 1945   [phone].

## 2024-04-11 NOTE — TELEPHONE ENCOUNTER
Per Dr Marshall;  Please notify patient the benefit of using the baby aspirin outweighs the risk of taking it in the face of a hernia/reflux.  If she becomes symptomatic with the use of the aspirin i.e. progression of reflux symptoms we can treat those symptoms more aggressively.     This message has been sent to the pt via my chart

## 2024-04-16 ENCOUNTER — TELEPHONE (OUTPATIENT)
Dept: FAMILY MEDICINE CLINIC | Facility: CLINIC | Age: 79
End: 2024-04-16
Payer: MEDICARE

## 2024-04-16 NOTE — TELEPHONE ENCOUNTER
"Left vm for pt to callback     Relay     \"Mammogram shows dense breast tissue, which can lower sensitivity of the mammogram.  However, there were no suspicious findings.  Radiologist recommended continue with yearly screening, we can also consider performing supplemental MRI due to dense breast tissue if patient is interested given family history. \"                "

## 2024-04-16 NOTE — TELEPHONE ENCOUNTER
----- Message from BEATRIS Fitzgerald sent at 4/15/2024  7:35 AM EDT -----  Mammogram shows dense breast tissue, which can lower sensitivity of the mammogram.  However, there were no suspicious findings.  Radiologist recommended continue with yearly mammograms.  We can also perform supplemental MRI due to dense breast tissue if patient is interested given family history.

## 2024-04-17 NOTE — TELEPHONE ENCOUNTER
"Discussed w pt. Pt v/u     \"Cardiac calcium score 0.  These findings indicate no identifiable coronary atherosclerotic burden.     There were findings of emphysema, which is why she is following with pulmonology.  There is a stable slight dilation of the thoracic aorta. Recommend repeat imaging in 1 year.\"  "

## 2024-04-17 NOTE — TELEPHONE ENCOUNTER
Name: Елена Beach    Relationship: Self    Best Callback Number: [phone]    HUB PROVIDED THE RELAY MESSAGE FROM THE OFFICE   PATIENT HAS FURTHER QUESTIONS AND WOULD LIKE A CALL BACK AT THE FOLLOWING PHONE NUMBER      ADDITIONAL INFORMATION:  PATIENT WOULD LIKE TO KNOW ABOUT HER CALCIUM SCAN AS WELL.

## 2024-06-17 DIAGNOSIS — Z13.89 ENCOUNTER FOR SURVEILLANCE OF ABNORMAL NEVI: Primary | ICD-10-CM

## 2024-08-05 ENCOUNTER — HOSPITAL ENCOUNTER (OUTPATIENT)
Dept: BONE DENSITY | Facility: HOSPITAL | Age: 79
Discharge: HOME | End: 2024-08-05
Admitting: NURSE PRACTITIONER
Payer: MEDICARE

## 2024-08-05 DIAGNOSIS — Z13.820 SCREENING FOR OSTEOPOROSIS: ICD-10-CM

## 2024-08-05 DIAGNOSIS — Z78.0 POST-MENOPAUSAL: ICD-10-CM

## 2024-08-05 PROCEDURE — 77080 DXA BONE DENSITY AXIAL: CPT

## 2024-08-23 DIAGNOSIS — Z13.0 SCREENING FOR ENDOCRINE, METABOLIC AND IMMUNITY DISORDER: ICD-10-CM

## 2024-08-23 DIAGNOSIS — Z13.0 SCREENING FOR DEFICIENCY ANEMIA: ICD-10-CM

## 2024-08-23 DIAGNOSIS — Z13.29 SCREENING FOR THYROID DISORDER: ICD-10-CM

## 2024-08-23 DIAGNOSIS — Z13.29 SCREENING FOR ENDOCRINE, METABOLIC AND IMMUNITY DISORDER: ICD-10-CM

## 2024-08-23 DIAGNOSIS — Z13.1 SCREENING FOR DIABETES MELLITUS: ICD-10-CM

## 2024-08-23 DIAGNOSIS — R79.89 HIGH SERUM FERRITIN: Primary | ICD-10-CM

## 2024-08-23 DIAGNOSIS — Z13.228 SCREENING FOR ENDOCRINE, METABOLIC AND IMMUNITY DISORDER: ICD-10-CM

## 2024-08-23 DIAGNOSIS — Z13.220 SCREENING FOR LIPID DISORDERS: ICD-10-CM

## 2024-08-23 DIAGNOSIS — E55.9 VITAMIN D DEFICIENCY: ICD-10-CM

## 2024-08-23 DIAGNOSIS — Z13.21 ENCOUNTER FOR VITAMIN DEFICIENCY SCREENING: ICD-10-CM

## 2024-08-27 DIAGNOSIS — E78.5 HYPERLIPIDEMIA, UNSPECIFIED HYPERLIPIDEMIA TYPE: ICD-10-CM

## 2024-08-27 RX ORDER — ATORVASTATIN CALCIUM 10 MG/1
10 TABLET, FILM COATED ORAL NIGHTLY
Qty: 90 TABLET | Refills: 3 | Status: SHIPPED | OUTPATIENT
Start: 2024-08-27

## 2024-08-30 ENCOUNTER — OFFICE VISIT (OUTPATIENT)
Dept: FAMILY MEDICINE CLINIC | Facility: CLINIC | Age: 79
End: 2024-08-30
Payer: MEDICARE

## 2024-08-30 VITALS
TEMPERATURE: 98 F | OXYGEN SATURATION: 98 % | HEIGHT: 62 IN | BODY MASS INDEX: 16.2 KG/M2 | DIASTOLIC BLOOD PRESSURE: 66 MMHG | HEART RATE: 83 BPM | SYSTOLIC BLOOD PRESSURE: 106 MMHG | WEIGHT: 88 LBS

## 2024-08-30 DIAGNOSIS — E78.5 HYPERLIPIDEMIA, UNSPECIFIED HYPERLIPIDEMIA TYPE: Primary | ICD-10-CM

## 2024-08-30 DIAGNOSIS — J30.9 ALLERGIC RHINITIS, UNSPECIFIED SEASONALITY, UNSPECIFIED TRIGGER: ICD-10-CM

## 2024-08-30 DIAGNOSIS — M85.89 OSTEOPENIA OF MULTIPLE SITES: ICD-10-CM

## 2024-08-30 PROCEDURE — 99214 OFFICE O/P EST MOD 30 MIN: CPT | Performed by: NURSE PRACTITIONER

## 2024-08-30 PROCEDURE — 1160F RVW MEDS BY RX/DR IN RCRD: CPT | Performed by: NURSE PRACTITIONER

## 2024-08-30 PROCEDURE — 1159F MED LIST DOCD IN RCRD: CPT | Performed by: NURSE PRACTITIONER

## 2024-08-30 PROCEDURE — 1126F AMNT PAIN NOTED NONE PRSNT: CPT | Performed by: NURSE PRACTITIONER

## 2024-08-30 RX ORDER — AZELASTINE HYDROCHLORIDE, FLUTICASONE PROPIONATE 137; 50 UG/1; UG/1
1 SPRAY, METERED NASAL 2 TIMES DAILY
Qty: 23 G | Refills: 1 | Status: SHIPPED | OUTPATIENT
Start: 2024-08-30

## 2024-08-30 RX ORDER — ASPIRIN 81 MG/1
81 TABLET ORAL DAILY
COMMUNITY

## 2024-08-30 NOTE — PROGRESS NOTES
"Chief Complaint  Hyperlipidemia (6M fu - labs 8/27/24)    Subjective        Елена Beach presents to [facility]  History of Present Illness  Елена Beach is a 79 y.o. female who presents to the clinic today to follow-up on hyperlipidemia. She had labs prior to today's visit. Since her last visit, patient was hospitalized at [facility] for complaints of chest pain.  Patient underwent Lexiscan that was normal. She had a steroid shot and breathing treatment that helped. She has been taking aspirin daily. She had a cardiac CT scan done in April revealing calcium score of 0.  She continues to follow with pulmonologist for COPD.  CT scan of the chest recommended prior to follow-up.  She also underwent EGD with gastroenterology in March.  It was recommended patient continue PPI and repeat EGD in 3 years. Recent DEXA scan showed osteopenia. She does yoga once a week.     Objective   Vital Signs:  /66   Pulse 83   Temp 98 °F (36.7 °C)   Ht 157.5 cm (62.01\")   Wt 39.9 kg (88 lb)   SpO2 98%   BMI 16.09 kg/m²   Estimated body mass index is 16.09 kg/m² as calculated from the following:    Height as of this encounter: 157.5 cm (62.01\").    Weight as of this encounter: 39.9 kg (88 lb).        Physical Exam  Vitals reviewed.   Constitutional:       General: She is not in acute distress.     Appearance: Normal appearance. She is not ill-appearing, toxic-appearing or diaphoretic.   HENT:      Head: Normocephalic and atraumatic.   Eyes:      General: No scleral icterus.        Left eye: No discharge.      Extraocular Movements: Extraocular movements intact.   Pulmonary:      Effort: Pulmonary effort is normal. No respiratory distress.   Neurological:      General: No focal deficit present.      Mental Status: She is alert and oriented to person, place, and time.      Motor: No weakness.      Gait: Gait normal.   Psychiatric:         Mood and Affect: Mood normal.         Behavior: " Behavior normal.        Result Review :    Common labs          2/7/2024    08:38 8/27/2024    08:59   Common Labs   Glucose 91  95    BUN 11  11    Creatinine 0.57  0.47    Sodium 141  138    Potassium 4.0  4.1    Chloride 98  97    Calcium 9.8  9.7    Total Protein 7.4  6.8    Albumin 4.5  4.2    Total Bilirubin 0.4  0.3    Alkaline Phosphatase 127  109    AST (SGOT) 33  29    ALT (SGPT) 22  20    WBC 6.0  4.86    Hemoglobin 14.4  13.0    Hematocrit 42.2  39.4    Platelets 336  308    Total Cholesterol 190  178    Triglycerides 67  59    HDL Cholesterol 87  87    LDL Cholesterol  91  80    Hemoglobin A1C 5.9  5.90    DEXA Bone Density Axial (08/05/2024 12:44)   DISCHARGE SUMMARY - SCAN by [employer], [place] (04/06/2024 00:00)   CT Cardiac Calcium Score Without Dye (04/05/2024 16:26)   Data reviewed : Cardiology studies cardiac CT, Lexiscan and Recent hospitalization notes [facility] April           Assessment and Plan   Diagnoses and all orders for this visit:    1. Hyperlipidemia, unspecified hyperlipidemia type (Primary)  Assessment & Plan:   Lipid abnormalities are stable    Plan:  Continue same medication/s without change.      Discussed medication dosage, use, side effects, and goals of treatment in detail.    Counseled patient on lifestyle modifications to help control hyperlipidemia.     Patient Treatment Goals:   LDL < 80    Followup in 6 months.      2. Osteopenia of multiple sites  Assessment & Plan:  Reviewed recent DEXA scan revealing osteopenia.  It does show decrease in bone density of lumbar spine and hip.  Patient has not been performing weightbearing exercise.  We spent time discussing this today.  Discussed recommended amount of calcium and vitamin D.  Will repeat DEXA scan in 2 years. Discussed potential medication options.       3. Allergic rhinitis, unspecified seasonality, unspecified trigger  -     Azelastine-Fluticasone (Dymista) 137-50 MCG/ACT suspension; 1 spray into the nostril(s)  as directed by provider 2 (Two) Times a Day.  Dispense: 23 g; Refill: 1      Reviewed cardiac CT scan.  There was stable 3.8 cm ectasia of the aortic root and 3.6 cm ectasia of the ascending thoracic aorta.  Consider repeating CT scan or echocardiogram for next year.  Has presence of pulmonary nodules.  Patient currently follows with pulmonology.         Follow Up   Return in about 6 months (around 2/17/2025) for Medicare Wellness- labs 1-2 weeks prior.  Patient was given instructions and counseling regarding her condition or for health maintenance advice. Please see specific information pulled into the AVS if appropriate.       Electronically signed by BEATRIS Steele, 08/30/24, 12:37 PM EDT.

## 2024-08-30 NOTE — ASSESSMENT & PLAN NOTE
Reviewed recent DEXA scan revealing osteopenia.  It does show decrease in bone density of lumbar spine and hip.  Patient has not been performing weightbearing exercise.  We spent time discussing this today.  Discussed recommended amount of calcium and vitamin D.  Will repeat DEXA scan in 2 years. Discussed potential medication options.

## 2024-08-30 NOTE — ASSESSMENT & PLAN NOTE
Lipid abnormalities are stable    Plan:  Continue same medication/s without change.      Discussed medication dosage, use, side effects, and goals of treatment in detail.    Counseled patient on lifestyle modifications to help control hyperlipidemia.     Patient Treatment Goals:   LDL < 80    Followup in 6 months.

## 2024-08-30 NOTE — PATIENT INSTRUCTIONS
-Walking at least 3 times a week for 20 minutes with light free weights.   - Calcium 1,000-1,200 mg per day  - Vitamin D 1,000 units daily

## 2024-09-04 ENCOUNTER — PRIOR AUTHORIZATION (OUTPATIENT)
Dept: FAMILY MEDICINE CLINIC | Facility: CLINIC | Age: 79
End: 2024-09-04
Payer: MEDICARE

## 2024-09-04 ENCOUNTER — PATIENT MESSAGE (OUTPATIENT)
Dept: FAMILY MEDICINE CLINIC | Facility: CLINIC | Age: 79
End: 2024-09-04
Payer: MEDICARE

## 2024-09-04 ENCOUNTER — TRANSCRIBE ORDERS (OUTPATIENT)
Dept: ADMINISTRATIVE | Facility: HOSPITAL | Age: 79
End: 2024-09-04
Payer: MEDICARE

## 2024-09-04 DIAGNOSIS — Z87.891 PERSONAL HISTORY OF TOBACCO USE, PRESENTING HAZARDS TO HEALTH: Primary | ICD-10-CM

## 2024-09-04 DIAGNOSIS — Z87.891 PERSONAL HISTORY OF NICOTINE DEPENDENCE: ICD-10-CM

## 2024-09-04 NOTE — TELEPHONE ENCOUNTER
PA sent to plan via [facility]  Azelastine-Fluticasone (Dymista) 137-50 MCG/ACT suspension

## 2024-09-09 NOTE — TELEPHONE ENCOUNTER
PA DENIED   Preferred alt:  Cetrizine oral solution and ipratropium bromide nasal spray   Please advise

## 2024-09-10 DIAGNOSIS — J30.9 ALLERGIC RHINITIS, UNSPECIFIED SEASONALITY, UNSPECIFIED TRIGGER: Primary | ICD-10-CM

## 2024-09-10 RX ORDER — IPRATROPIUM BROMIDE 21 UG/1
2 SPRAY, METERED NASAL EVERY 12 HOURS
Qty: 30 ML | Refills: 0 | Status: SHIPPED | OUTPATIENT
Start: 2024-09-10

## 2024-09-11 ENCOUNTER — TELEPHONE (OUTPATIENT)
Dept: FAMILY MEDICINE CLINIC | Facility: CLINIC | Age: 79
End: 2024-09-11
Payer: MEDICARE

## 2024-09-11 NOTE — TELEPHONE ENCOUNTER
"No answer, unable to leave detailed verbal release per verbal release. MC message also sent in original encounter.     Relay     \"Unfortunately insurance has denied Azelastine -Fluticasone (Dymista). The insurance preferred  - ipatropium nasal spray prescribed as alternative. This has been sent to [facility]. Please let us know if you have any questions or trouble filling this new new medication.\"            "

## 2024-10-23 ENCOUNTER — HOSPITAL ENCOUNTER (OUTPATIENT)
Dept: CT IMAGING | Facility: HOSPITAL | Age: 79
Discharge: HOME | End: 2024-10-23
Admitting: INTERNAL MEDICINE
Payer: MEDICARE

## 2024-10-23 DIAGNOSIS — Z87.891 PERSONAL HISTORY OF TOBACCO USE, PRESENTING HAZARDS TO HEALTH: ICD-10-CM

## 2024-10-23 DIAGNOSIS — Z87.891 PERSONAL HISTORY OF NICOTINE DEPENDENCE: ICD-10-CM

## 2024-10-23 PROCEDURE — 71271 CT THORAX LUNG CANCER SCR C-: CPT

## 2024-11-04 DIAGNOSIS — F41.9 ANXIETY: ICD-10-CM

## 2024-11-04 RX ORDER — BUSPIRONE HYDROCHLORIDE 10 MG/1
10 TABLET ORAL 2 TIMES DAILY
Qty: 180 TABLET | Refills: 3 | Status: SHIPPED | OUTPATIENT
Start: 2024-11-04

## 2025-02-18 DIAGNOSIS — E55.9 VITAMIN D DEFICIENCY: ICD-10-CM

## 2025-02-18 DIAGNOSIS — Z13.0 SCREENING FOR IRON DEFICIENCY ANEMIA: ICD-10-CM

## 2025-02-18 DIAGNOSIS — Z13.29 SCREENING FOR THYROID DISORDER: ICD-10-CM

## 2025-02-18 DIAGNOSIS — F41.9 ANXIETY: Primary | ICD-10-CM

## 2025-02-18 DIAGNOSIS — E78.5 HYPERLIPIDEMIA, UNSPECIFIED HYPERLIPIDEMIA TYPE: ICD-10-CM

## 2025-02-18 DIAGNOSIS — R79.89 HIGH SERUM FERRITIN: ICD-10-CM

## 2025-02-18 DIAGNOSIS — R73.09 ELEVATED GLUCOSE: ICD-10-CM

## 2025-02-18 DIAGNOSIS — Z13.0 SCREENING FOR DEFICIENCY ANEMIA: ICD-10-CM

## 2025-02-18 DIAGNOSIS — Z13.228 SCREENING FOR ENDOCRINE, METABOLIC AND IMMUNITY DISORDER: ICD-10-CM

## 2025-02-18 DIAGNOSIS — Z13.29 SCREENING FOR ENDOCRINE, METABOLIC AND IMMUNITY DISORDER: ICD-10-CM

## 2025-02-18 DIAGNOSIS — Z13.0 SCREENING FOR ENDOCRINE, METABOLIC AND IMMUNITY DISORDER: ICD-10-CM

## 2025-02-18 DIAGNOSIS — Z13.220 SCREENING FOR LIPID DISORDERS: ICD-10-CM

## 2025-02-18 DIAGNOSIS — Z13.1 SCREENING FOR DIABETES MELLITUS: ICD-10-CM

## 2025-02-18 DIAGNOSIS — Z00.00 MEDICARE ANNUAL WELLNESS VISIT, SUBSEQUENT: ICD-10-CM

## 2025-02-19 LAB
25(OH)D3+25(OH)D2 SERPL-MCNC: 56.2 NG/ML (ref 30–100)
ALBUMIN SERPL-MCNC: 4.2 G/DL (ref 3.5–5.2)
ALBUMIN/GLOB SERPL: 1.6 G/DL
ALP SERPL-CCNC: 128 U/L (ref 39–117)
ALT SERPL-CCNC: 19 U/L (ref 1–33)
AST SERPL-CCNC: 30 U/L (ref 1–32)
BASOPHILS # BLD AUTO: 0.06 10*3/MM3 (ref 0–0.2)
BASOPHILS NFR BLD AUTO: 1.2 % (ref 0–1.5)
BILIRUB SERPL-MCNC: 0.5 MG/DL (ref 0–1.2)
BUN SERPL-MCNC: 8 MG/DL (ref 8–23)
BUN/CREAT SERPL: 15.7 (ref 7–25)
CALCIUM SERPL-MCNC: 9.5 MG/DL (ref 8.6–10.5)
CHLORIDE SERPL-SCNC: 97 MMOL/L (ref 98–107)
CHOLEST SERPL-MCNC: 176 MG/DL (ref 0–200)
CO2 SERPL-SCNC: 30 MMOL/L (ref 22–29)
CREAT SERPL-MCNC: 0.51 MG/DL (ref 0.57–1)
EGFRCR SERPLBLD CKD-EPI 2021: 95.7 ML/MIN/1.73
EOSINOPHIL # BLD AUTO: 0.28 10*3/MM3 (ref 0–0.4)
EOSINOPHIL NFR BLD AUTO: 5.7 % (ref 0.3–6.2)
ERYTHROCYTE [DISTWIDTH] IN BLOOD BY AUTOMATED COUNT: 12.2 % (ref 12.3–15.4)
FERRITIN SERPL-MCNC: 175 NG/ML (ref 13–150)
FOLATE SERPL-MCNC: 18.9 NG/ML (ref 4.78–24.2)
GLOBULIN SER CALC-MCNC: 2.7 GM/DL
GLUCOSE SERPL-MCNC: 81 MG/DL (ref 65–99)
HBA1C MFR BLD: 6 % (ref 4.8–5.6)
HCT VFR BLD AUTO: 40.2 % (ref 34–46.6)
HDLC SERPL-MCNC: 83 MG/DL (ref 40–60)
HGB BLD-MCNC: 13.5 G/DL (ref 12–15.9)
IMM GRANULOCYTES # BLD AUTO: 0.01 10*3/MM3 (ref 0–0.05)
IMM GRANULOCYTES NFR BLD AUTO: 0.2 % (ref 0–0.5)
LDLC SERPL CALC-MCNC: 81 MG/DL (ref 0–100)
LDLC/HDLC SERPL: 0.97 {RATIO}
LYMPHOCYTES # BLD AUTO: 1.01 10*3/MM3 (ref 0.7–3.1)
LYMPHOCYTES NFR BLD AUTO: 20.4 % (ref 19.6–45.3)
MCH RBC QN AUTO: 32.1 PG (ref 26.6–33)
MCHC RBC AUTO-ENTMCNC: 33.6 G/DL (ref 31.5–35.7)
MCV RBC AUTO: 95.5 FL (ref 79–97)
MONOCYTES # BLD AUTO: 0.45 10*3/MM3 (ref 0.1–0.9)
MONOCYTES NFR BLD AUTO: 9.1 % (ref 5–12)
NEUTROPHILS # BLD AUTO: 3.13 10*3/MM3 (ref 1.7–7)
NEUTROPHILS NFR BLD AUTO: 63.4 % (ref 42.7–76)
NRBC BLD AUTO-RTO: 0 /100 WBC (ref 0–0.2)
PLATELET # BLD AUTO: 344 10*3/MM3 (ref 140–450)
POTASSIUM SERPL-SCNC: 3.6 MMOL/L (ref 3.5–5.2)
PROT SERPL-MCNC: 6.9 G/DL (ref 6–8.5)
RBC # BLD AUTO: 4.21 10*6/MM3 (ref 3.77–5.28)
SODIUM SERPL-SCNC: 138 MMOL/L (ref 136–145)
TRIGL SERPL-MCNC: 61 MG/DL (ref 0–150)
VIT B12 SERPL-MCNC: 706 PG/ML (ref 211–946)
VLDLC SERPL CALC-MCNC: 12 MG/DL (ref 5–40)
WBC # BLD AUTO: 4.94 10*3/MM3 (ref 3.4–10.8)

## 2025-02-26 ENCOUNTER — TRANSCRIBE ORDERS (OUTPATIENT)
Dept: ADMINISTRATIVE | Facility: HOSPITAL | Age: 80
End: 2025-02-26
Payer: MEDICARE

## 2025-02-26 DIAGNOSIS — Z12.31 BREAST CANCER SCREENING BY MAMMOGRAM: Primary | ICD-10-CM

## 2025-02-27 ENCOUNTER — OFFICE VISIT (OUTPATIENT)
Dept: FAMILY MEDICINE CLINIC | Facility: CLINIC | Age: 80
End: 2025-02-27
Payer: MEDICARE

## 2025-02-27 VITALS
DIASTOLIC BLOOD PRESSURE: 70 MMHG | HEART RATE: 80 BPM | HEIGHT: 62 IN | BODY MASS INDEX: 16.75 KG/M2 | OXYGEN SATURATION: 96 % | TEMPERATURE: 98.1 F | WEIGHT: 91 LBS | SYSTOLIC BLOOD PRESSURE: 116 MMHG

## 2025-02-27 DIAGNOSIS — Z00.00 MEDICARE ANNUAL WELLNESS VISIT, SUBSEQUENT: Primary | ICD-10-CM

## 2025-02-27 DIAGNOSIS — J30.9 CHRONIC ALLERGIC RHINITIS: ICD-10-CM

## 2025-02-27 PROCEDURE — 1160F RVW MEDS BY RX/DR IN RCRD: CPT | Performed by: NURSE PRACTITIONER

## 2025-02-27 PROCEDURE — 1126F AMNT PAIN NOTED NONE PRSNT: CPT | Performed by: NURSE PRACTITIONER

## 2025-02-27 PROCEDURE — G0439 PPPS, SUBSEQ VISIT: HCPCS | Performed by: NURSE PRACTITIONER

## 2025-02-27 PROCEDURE — 1159F MED LIST DOCD IN RCRD: CPT | Performed by: NURSE PRACTITIONER

## 2025-02-27 PROCEDURE — 99213 OFFICE O/P EST LOW 20 MIN: CPT | Performed by: NURSE PRACTITIONER

## 2025-02-27 RX ORDER — MONTELUKAST SODIUM 10 MG/1
10 TABLET ORAL NIGHTLY
Qty: 90 TABLET | Refills: 0 | Status: SHIPPED | OUTPATIENT
Start: 2025-02-27

## 2025-02-27 NOTE — PROGRESS NOTES
Subjective   The ABCs of the Annual Wellness Visit  Medicare Wellness Visit      Елена Beach is a 79 y.o. patient who presents for a Medicare Wellness Visit. She has a history of hyperlipidemia, hypothyroidism, GERD, celiac disease, iron deficiency anemia, depression, anxiety, fibromyalgia, osteopenia, arthritis, and COPD.     The following portions of the patient's history were reviewed and   updated as appropriate: allergies, current medications, past family history, past medical history, past social history, past surgical history, and problem list.    Compared to one year ago, the patient's physical   health is the same.  Compared to one year ago, the patient's mental   health is the same.    Recent Hospitalizations:  She was not admitted to the hospital during the last year.     Current Medical Providers:  Patient Care Team:  Denisse Edwards APRN as PCP - General (Nurse Practitioner)  Izabel Villa MD as Consulting Physician (Pulmonary Disease)  Igor Marshall MD as Consulting Physician (Gastroenterology)  Kori Ramos OD as Consulting Physician (Optometry)  Mejia Ordoñez MD as Consulting Physician (Hand Surgery)    Outpatient Medications Prior to Visit   Medication Sig Dispense Refill    atorvastatin (LIPITOR) 10 MG tablet TAKE 1 TABLET EVERY NIGHT 90 tablet 3    azithromycin (ZITHROMAX) 250 MG tablet Take 1 tablet by mouth 3 (Three) Times a Week.      budesonide (PULMICORT) 0.5 MG/2ML nebulizer solution Take 2 mL by nebulization Daily. (Patient taking differently: Take 2 mL by nebulization 3 times a day.)      busPIRone (BUSPAR) 10 MG tablet TAKE 1 TABLET TWICE DAILY 180 tablet 3    Calcium Citrate-Vitamin D (CALCIUM CITRATE + D PO) Take 1 tablet by mouth Daily.      Ferrous Gluconate (IRON 27 PO) Take 1 tablet by mouth Every Other Day.      glucosamine sulfate 500 MG capsule capsule Take  by mouth Daily.      guaiFENesin (MUCINEX) 600 MG 12 hr tablet Take 1 tablet by  mouth 2 (Two) Times a Day. (Patient taking differently: Take 1 tablet by mouth Daily.)      ipratropium (ATROVENT) 0.03 % nasal spray 2 sprays into the nostril(s) as directed by provider Every 12 (Twelve) Hours. 30 mL 0    Melatonin 10 MG capsule Place 1 capsule under the tongue Every Night.      Multiple Vitamins-Minerals (MULTIVITAMIN ADULT PO) Take 1 tablet by mouth Daily.      omeprazole (priLOSEC) 40 MG capsule TAKE 1 CAPSULE TWICE DAILY BEFORE MEALS (NEED MD APPOINTMENT FOR REFILLS) 180 capsule 3    probiotic (CULTURELLE) capsule capsule Take 1 capsule by mouth Daily.      arformoterol (BROVANA) 15 MCG/2ML nebulizer solution USE 1 VIAL IN NEBULIZER TWICE DAILY      aspirin 81 MG EC tablet Take 1 tablet by mouth Daily.       No facility-administered medications prior to visit.     No opioid medication identified on active medication list. I have reviewed chart for other potential  high risk medication/s and harmful drug interactions in the elderly.      Aspirin is on active medication list. Aspirin use is not indicated based on review of current medical condition/s. Risk of harm outweighs potential benefits. Patient instructed to discontinue this medication.  .      Patient Active Problem List   Diagnosis    Fibromyalgia    Arthritis    Hiatal hernia    Depression    Asthma    Hyperlipidemia    Iron deficiency anemia    Chronic obstructive pulmonary disease    Chronic fatigue    Snoring    Dependence on nicotine from cigarettes    Insomnia    Osteopenia of multiple sites    Screening for iron deficiency anemia    Shortness of breath    Family history of hypothyroidism    Diarrhea    Lymphadenopathy    Nausea    Gastroesophageal reflux disease without esophagitis    Duodenitis    Early satiety    Anxiety    Celiac disease    Gastroesophageal reflux disease    Weight loss     Advance Care Planning Advance Directive is on file.  ACP discussion was held with the patient during this visit. Patient has an advance  "directive in EMR which is still valid.             Objective   Vitals:    25 1027   BP: 116/70   Pulse: 80   Temp: 98.1 °F (36.7 °C)   SpO2: 96%   Weight: 41.3 kg (91 lb)   Height: 157.5 cm (62.01\")   PainSc: 0-No pain       Estimated body mass index is 16.64 kg/m² as calculated from the following:    Height as of this encounter: 157.5 cm (62.01\").    Weight as of this encounter: 41.3 kg (91 lb).    BMI is below normal parameters (malnutrition). Recommendations: discussed diet. Weight stable and gained 3 pounds since last visit.            Does the patient have evidence of cognitive impairment? No  Lab Results   Component Value Date    CHLPL 176 2025    TRIG 61 2025    HDL 83 (H) 2025    LDL 81 2025    VLDL 12 2025    HGBA1C 6.00 (H) 2025     Folate (2025 08:50)  Vitamin B12 (2025 08:50)  Ferritin (2025 08:50)  Hemoglobin A1c (2025 08:50)  Lipid Panel With LDL / HDL Ratio (2025 08:50)  Comprehensive Metabolic Panel (2025 08:50)  CBC & Differential (2025 08:50)  Vitamin D,25-Hydroxy (2025 08:50)    CT Chest Low Dose Cancer Screening WO (10/23/2024 13:15)   CT Cardiac Calcium Score Without Dye (2024 16:26)                                                                                              Health  Risk Assessment    Smoking Status:  Social History     Tobacco Use   Smoking Status Former    Current packs/day: 0.00    Average packs/day: 1 pack/day for 40.0 years (40.0 ttl pk-yrs)    Types: Cigarettes    Start date: 1976    Quit date: 2016    Years since quittin.3    Passive exposure: Past   Smokeless Tobacco Never     Alcohol Consumption:  Social History     Substance and Sexual Activity   Alcohol Use Not Currently    Alcohol/week: 1.0 standard drink of alcohol    Types: 1 Shots of liquor per week    Comment: 2 monthly       Fall Risk Screen  STEADI Fall Risk Assessment was completed, and patient is at " MODERATE risk for falls. Assessment completed on:2025    Depression Screening   Little interest or pleasure in doing things? Not at all   Feeling down, depressed, or hopeless? Not at all   PHQ-2 Total Score 0      Health Habits and Functional and Cognitive Screenin/20/2025     8:11 AM   Functional & Cognitive Status   Do you have difficulty preparing food and eating? No    Do you have difficulty bathing yourself, getting dressed or grooming yourself? No    Do you have difficulty using the toilet? No    Do you have difficulty moving around from place to place? No    Do you have trouble with steps or getting out of a bed or a chair? No    Current Diet Well Balanced Diet    Dental Exam Up to date    Eye Exam Up to date    Exercise (times per week) 2 times per week    Current Exercises Include Walking    Do you need help using the phone?  No    Are you deaf or do you have serious difficulty hearing?  No    Do you need help to go to places out of walking distance? No    Do you need help shopping? No    Do you need help preparing meals?  No    Do you need help with housework?  No    Do you need help with laundry? No    Do you need help taking your medications? No    Do you need help managing money? No    Do you ever drive or ride in a car without wearing a seat belt? No    Have you felt unusual stress, anger or loneliness in the last month? No    Who do you live with? Alone    If you need help, do you have trouble finding someone available to you? No    Have you been bothered in the last four weeks by sexual problems? No    Do you have difficulty concentrating, remembering or making decisions? No        Patient-reported           Age-appropriate Screening Schedule:  Refer to the list below for future screening recommendations based on patient's age, sex and/or medical conditions. Orders for these recommended tests are listed in the plan section. The patient has been provided with a written plan.    Health  Maintenance List  Health Maintenance   Topic Date Due    ZOSTER VACCINE (1 of 2) Never done    BMI FOLLOWUP  02/15/2025    LUNG CANCER SCREENING  10/23/2025    COLORECTAL CANCER SCREENING  02/01/2026    LIPID PANEL  02/18/2026    ANNUAL WELLNESS VISIT  02/27/2026    DXA SCAN  08/05/2026    TDAP/TD VACCINES (3 - Td or Tdap) 10/03/2026    GASTROSCOPY (EGD)  03/08/2027    HEPATITIS C SCREENING  Completed    COVID-19 Vaccine  Completed    RSV Vaccine - Adults  Completed    INFLUENZA VACCINE  Completed    Pneumococcal Vaccine 50+  Completed    MAMMOGRAM  Discontinued                                                                                                                                                CMS Preventative Services Quick Reference  Risk Factors Identified During Encounter  None Identified    Елена Beach is a 79 y.o. female who presents to the clinic today for her medicare wellness visit.  She is feeling well today.  She continues to follow with pulmonology.  She has had issues getting her inhaler.  I recommended she discuss with pulmonologist and also check with Medicare for formulary list of inhalers.  Reviewed labs.  Her ferritin was elevated.  Recommended stopping oral iron supplement.    He continues to have issues with chronic rhinitis.  She has seen ENT.  She has tried several different nasal sprays including ipratropium, Flonase, and Astelin without relief. She takes a low dose allergy medication daily.      Health maintenance screenings:   Colon cancer screening: Colonoscopy performed in 2016.  Repeat in 10 years recommended.  Breast cancer screening: Mammogram last performed April 2024. Repeat imaging ordered and patient scheduled.   Osteoporosis screening: DEXA scan last performed August 2024 revealing osteopenia/low bone density. Discussed weight bearing exercise, calcium, vitamin d.   Immunizations: Due for Shingrix, did not have chickenpox as a kid. Can consider titer check at  follow-up.   Cardiac: Cardiac CT scan performed April 2024 and score 0.  There were findings of stable 3.8 cm ectasia of the aortic root and 3.6 cm ectasia of the ascending thoracic aorta.  Plan to repeat in 1 to 2 years.    The above risks/problems have been discussed with the patient.  Pertinent information has been shared with the patient in the After Visit Summary.  An After Visit Summary and PPPS were made available to the patient.    Follow Up:   Next Medicare Wellness visit to be scheduled in 1 year.           Common labs          8/27/2024    08:59 2/18/2025    08:50   Common Labs   Glucose 95  81    BUN 11  8    Creatinine 0.47  0.51    Sodium 138  138    Potassium 4.1  3.6    Chloride 97  97    Calcium 9.7  9.5    Albumin 4.2  4.2    Total Bilirubin 0.3  0.5    Alkaline Phosphatase 109  128    AST (SGOT) 29  30    ALT (SGPT) 20  19    WBC 4.86  4.94    Hemoglobin 13.0  13.5    Hematocrit 39.4  40.2    Platelets 308  344    Total Cholesterol 178  176    Triglycerides 59  61    HDL Cholesterol 87  83    LDL Cholesterol  80  81    Hemoglobin A1C 5.90  6.00              Assessment and Plan            Chronic allergic rhinitis    Orders:    montelukast (Singulair) 10 MG tablet; Take 1 tablet by mouth Every Night.    Medicare annual wellness visit, subsequent         Patient continues to struggle with chronic allergic rhinitis despite nasal sprays and daily allergy medication.  Discussed Singulair.  Advised patient to stop should she notice any mood changes.  She will take this in the evening.  Follow-up if symptoms not improving. Consider omeprazole alternative.           Follow Up   Return in about 6 months (around 8/27/2025), or if symptoms worsen or fail to improve, for Recheck.  Patient was given instructions and counseling regarding her condition or for health maintenance advice. Please see specific information pulled into the AVS if appropriate.    Electronically signed by BEATRIS Steele, 02/27/25,  2:41 PM EST.

## 2025-03-24 RX ORDER — OMEPRAZOLE 40 MG/1
CAPSULE, DELAYED RELEASE ORAL
Qty: 180 CAPSULE | Refills: 0 | Status: SHIPPED | OUTPATIENT
Start: 2025-03-24

## 2025-04-15 ENCOUNTER — HOSPITAL ENCOUNTER (OUTPATIENT)
Dept: MAMMOGRAPHY | Facility: HOSPITAL | Age: 80
Discharge: HOME | End: 2025-04-15
Admitting: NURSE PRACTITIONER
Payer: MEDICARE

## 2025-04-15 DIAGNOSIS — Z12.31 BREAST CANCER SCREENING BY MAMMOGRAM: ICD-10-CM

## 2025-04-15 PROCEDURE — 77063 BREAST TOMOSYNTHESIS BI: CPT

## 2025-04-15 PROCEDURE — 77067 SCR MAMMO BI INCL CAD: CPT

## 2025-04-16 ENCOUNTER — TRANSCRIBE ORDERS (OUTPATIENT)
Dept: ADMINISTRATIVE | Facility: HOSPITAL | Age: 80
End: 2025-04-16
Payer: MEDICARE

## 2025-04-16 DIAGNOSIS — J44.9 CHRONIC OBSTRUCTIVE PULMONARY DISEASE, UNSPECIFIED COPD TYPE: Primary | ICD-10-CM

## 2025-06-30 RX ORDER — OMEPRAZOLE 40 MG/1
CAPSULE, DELAYED RELEASE ORAL
Qty: 180 CAPSULE | Refills: 3 | OUTPATIENT
Start: 2025-06-30

## 2025-07-01 RX ORDER — OMEPRAZOLE 40 MG/1
40 CAPSULE, DELAYED RELEASE ORAL 2 TIMES DAILY
Qty: 180 CAPSULE | Refills: 0 | Status: SHIPPED | OUTPATIENT
Start: 2025-07-01

## 2025-07-01 NOTE — TELEPHONE ENCOUNTER
Caller: Елена Beach    Relationship: Self    Best call back number: [phone]     Requested Prescriptions:   Requested Prescriptions     Pending Prescriptions Disp Refills    omeprazole (priLOSEC) 40 MG capsule 180 capsule 0        Pharmacy where request should be sent: [facility] - [facility] [address] - [phone] PH - [phone] FX     Last office visit with prescribing clinician: 11/14/2023   Last telemedicine visit with prescribing clinician: Visit date not found   Next office visit with prescribing clinician: Visit date not found     Additional details provided by patient: PATIENT IS REQUESTING REFILLS UNTIL HER YOUR SCHEDULED APPT ON 8/26/2025.     Does the patient have less than a 3 day supply:  [] Yes  [x] No    Would you like a call back once the refill request has been completed: [] Yes [x] No    If the office needs to give you a call back, can they leave a voicemail: [x] Yes [] No    Priyank Koehler   07/01/25 09:15 EDT

## 2025-08-07 DIAGNOSIS — E78.5 HYPERLIPIDEMIA, UNSPECIFIED HYPERLIPIDEMIA TYPE: ICD-10-CM

## 2025-08-07 RX ORDER — ATORVASTATIN CALCIUM 10 MG/1
10 TABLET, FILM COATED ORAL NIGHTLY
Qty: 90 TABLET | Refills: 3 | Status: SHIPPED | OUTPATIENT
Start: 2025-08-07

## (undated) DEVICE — SKIN PREP TRAY W/CHG: Brand: MEDLINE INDUSTRIES, INC.

## (undated) DEVICE — PAD,ABDOMINAL,8"X10",ST,LF: Brand: MEDLINE

## (undated) DEVICE — BNDG ELAS ELITE V/CLOSE 6IN 5YD LF STRL

## (undated) DEVICE — DRSNG BURN ACTICOAT FLEX 7 1X24IN

## (undated) DEVICE — MAT FLR ABSORBENT LG 4FT 10 2.5FT

## (undated) DEVICE — BLCK/BITE BLOX W/DENTL/RIM W/STRAP 54F

## (undated) DEVICE — PREP SOL POVIDONE/IODINE BT 4OZ

## (undated) DEVICE — DRSNG TELFA PAD NONADH STR 1S 3X8IN

## (undated) DEVICE — FRCP BX RADJAW4 NDL 2.8 240CM LG OG BX40

## (undated) DEVICE — APPL CHLORAPREP W/TINT 26ML ORNG

## (undated) DEVICE — KT ORCA ORCAPOD DISP STRL

## (undated) DEVICE — DUAL CUT SAGITTAL BLADE

## (undated) DEVICE — SPNG GZ WOVN 4X4IN 12PLY 10/BX STRL

## (undated) DEVICE — BITEBLOCK OMNI BLOC

## (undated) DEVICE — MSK ENDO PORT O2 POM ELITE CURAPLEX A/

## (undated) DEVICE — NDL SPINE 22G 31/2IN BLK

## (undated) DEVICE — SENSR O2 OXIMAX FNGR A/ 18IN NONSTR

## (undated) DEVICE — SOL ISO/ALC RUB 70PCT 4OZ

## (undated) DEVICE — ADAPT CLN BIOGUARD AIR/H2O DISP

## (undated) DEVICE — SOL NACL 0.9PCT 100ML SGL

## (undated) DEVICE — SUT VIC 2/0 CT2 27IN J269H

## (undated) DEVICE — PK KN TOTL 40

## (undated) DEVICE — CANN O2 ETCO2 FITS ALL CONN CO2 SMPL A/ 7IN DISP LF

## (undated) DEVICE — SUT VICRYL 1 CT1 27IN  JJ40G

## (undated) DEVICE — LN SMPL CO2 SHTRM SD STREAM W/M LUER

## (undated) DEVICE — KT DRN EVAC WND PVC PCH WTROC RND 10F400

## (undated) DEVICE — GLV SURG BIOGEL LTX PF 8 1/2

## (undated) DEVICE — GLV SURG TRIUMPH CLASSIC PF LTX 8.5 STRL

## (undated) DEVICE — UNDERCAST PADDING: Brand: DEROYAL

## (undated) DEVICE — DRSNG SURESITE WNDW 4X4.5

## (undated) DEVICE — TUBING, SUCTION, 1/4" X 10', STRAIGHT: Brand: MEDLINE